# Patient Record
Sex: FEMALE | Race: OTHER | Employment: OTHER | URBAN - METROPOLITAN AREA
[De-identification: names, ages, dates, MRNs, and addresses within clinical notes are randomized per-mention and may not be internally consistent; named-entity substitution may affect disease eponyms.]

---

## 2017-01-07 ENCOUNTER — APPOINTMENT (OUTPATIENT)
Dept: CT IMAGING | Age: 67
DRG: 194 | End: 2017-01-07
Attending: EMERGENCY MEDICINE
Payer: MEDICARE

## 2017-01-07 ENCOUNTER — APPOINTMENT (OUTPATIENT)
Dept: GENERAL RADIOLOGY | Age: 67
DRG: 194 | End: 2017-01-07
Attending: EMERGENCY MEDICINE
Payer: MEDICARE

## 2017-01-07 ENCOUNTER — APPOINTMENT (OUTPATIENT)
Dept: ULTRASOUND IMAGING | Age: 67
DRG: 194 | End: 2017-01-07
Attending: EMERGENCY MEDICINE
Payer: MEDICARE

## 2017-01-07 ENCOUNTER — HOSPITAL ENCOUNTER (INPATIENT)
Age: 67
LOS: 4 days | Discharge: HOME HEALTH | DRG: 194 | End: 2017-01-11
Attending: EMERGENCY MEDICINE | Admitting: HOSPITALIST
Payer: MEDICARE

## 2017-01-07 DIAGNOSIS — J18.9 PNEUMONIA DUE TO INFECTIOUS ORGANISM, UNSPECIFIED LATERALITY, UNSPECIFIED PART OF LUNG: Primary | ICD-10-CM

## 2017-01-07 PROBLEM — R09.02 HYPOXIA: Status: ACTIVE | Noted: 2017-01-07

## 2017-01-07 PROBLEM — R06.03 RESPIRATORY DISTRESS, ACUTE: Status: ACTIVE | Noted: 2017-01-07

## 2017-01-07 PROBLEM — I10 HTN (HYPERTENSION): Status: ACTIVE | Noted: 2017-01-07

## 2017-01-07 PROBLEM — I95.1 ORTHOSTASIS: Status: ACTIVE | Noted: 2017-01-07

## 2017-01-07 PROBLEM — E11.9 DM W/O COMPLICATION TYPE II (HCC): Status: ACTIVE | Noted: 2017-01-07

## 2017-01-07 PROBLEM — E86.0 DEHYDRATION: Status: ACTIVE | Noted: 2017-01-07

## 2017-01-07 LAB
ALBUMIN SERPL BCP-MCNC: 3.4 G/DL (ref 3.4–5)
ALBUMIN/GLOB SERPL: 0.8 {RATIO} (ref 0.8–1.7)
ALP SERPL-CCNC: 55 U/L (ref 45–117)
ALT SERPL-CCNC: 22 U/L (ref 13–56)
AMORPH CRY URNS QL MICRO: ABNORMAL
ANION GAP BLD CALC-SCNC: 7 MMOL/L (ref 3–18)
APPEARANCE UR: CLEAR
ARTERIAL PATENCY WRIST A: YES
AST SERPL W P-5'-P-CCNC: 36 U/L (ref 15–37)
BACTERIA URNS QL MICRO: NEGATIVE /HPF
BASE EXCESS BLD CALC-SCNC: 2 MMOL/L
BASOPHILS # BLD AUTO: 0 K/UL (ref 0–0.06)
BASOPHILS # BLD: 0 % (ref 0–2)
BDY SITE: ABNORMAL
BILIRUB SERPL-MCNC: 0.6 MG/DL (ref 0.2–1)
BILIRUB UR QL: NEGATIVE
BUN SERPL-MCNC: 8 MG/DL (ref 7–18)
BUN/CREAT SERPL: 9 (ref 12–20)
CALCIUM SERPL-MCNC: 8.6 MG/DL (ref 8.5–10.1)
CHLORIDE SERPL-SCNC: 98 MMOL/L (ref 100–108)
CK MB CFR SERPL CALC: ABNORMAL % (ref 0–4)
CK MB SERPL-MCNC: <0.5 NG/ML (ref 0.5–3.6)
CK SERPL-CCNC: 170 U/L (ref 26–192)
CO2 SERPL-SCNC: 30 MMOL/L (ref 21–32)
COLOR UR: YELLOW
CREAT SERPL-MCNC: 0.87 MG/DL (ref 0.6–1.3)
DIFFERENTIAL METHOD BLD: ABNORMAL
EOSINOPHIL # BLD: 0 K/UL (ref 0–0.4)
EOSINOPHIL NFR BLD: 0 % (ref 0–5)
EPITH CASTS URNS QL MICRO: ABNORMAL /LPF (ref 0–5)
ERYTHROCYTE [DISTWIDTH] IN BLOOD BY AUTOMATED COUNT: 12.6 % (ref 11.6–14.5)
EST. AVERAGE GLUCOSE BLD GHB EST-MCNC: 180 MG/DL
GAS FLOW.O2 O2 DELIVERY SYS: ABNORMAL L/MIN
GAS FLOW.O2 SETTING OXYMISER: 4 L/M
GLOBULIN SER CALC-MCNC: 4.5 G/DL (ref 2–4)
GLUCOSE BLD STRIP.AUTO-MCNC: 186 MG/DL (ref 70–110)
GLUCOSE SERPL-MCNC: 144 MG/DL (ref 74–99)
GLUCOSE UR STRIP.AUTO-MCNC: NEGATIVE MG/DL
HBA1C MFR BLD: 7.9 % (ref 4.5–5.6)
HCO3 BLD-SCNC: 25.7 MMOL/L (ref 22–26)
HCT VFR BLD AUTO: 38.4 % (ref 35–45)
HGB BLD-MCNC: 13.3 G/DL (ref 12–16)
HGB UR QL STRIP: NEGATIVE
KETONES UR QL STRIP.AUTO: ABNORMAL MG/DL
LEUKOCYTE ESTERASE UR QL STRIP.AUTO: NEGATIVE
LIPASE SERPL-CCNC: 131 U/L (ref 73–393)
LYMPHOCYTES # BLD AUTO: 14 % (ref 21–52)
LYMPHOCYTES # BLD: 0.9 K/UL (ref 0.9–3.6)
MCH RBC QN AUTO: 30.2 PG (ref 24–34)
MCHC RBC AUTO-ENTMCNC: 34.6 G/DL (ref 31–37)
MCV RBC AUTO: 87.3 FL (ref 74–97)
MONOCYTES # BLD: 0.8 K/UL (ref 0.05–1.2)
MONOCYTES NFR BLD AUTO: 12 % (ref 3–10)
MUCOUS THREADS URNS QL MICRO: ABNORMAL /LPF
NEUTS SEG # BLD: 4.7 K/UL (ref 1.8–8)
NEUTS SEG NFR BLD AUTO: 74 % (ref 40–73)
NITRITE UR QL STRIP.AUTO: NEGATIVE
O2/TOTAL GAS SETTING VFR VENT: 36 %
PCO2 BLD: 33.3 MMHG (ref 35–45)
PH BLD: 7.5 [PH] (ref 7.35–7.45)
PH UR STRIP: 5 [PH] (ref 5–8)
PLATELET # BLD AUTO: 191 K/UL (ref 135–420)
PMV BLD AUTO: 9.9 FL (ref 9.2–11.8)
PO2 BLD: 48 MMHG (ref 80–100)
POTASSIUM SERPL-SCNC: 4.5 MMOL/L (ref 3.5–5.5)
PROT SERPL-MCNC: 7.9 G/DL (ref 6.4–8.2)
PROT UR STRIP-MCNC: 30 MG/DL
RBC # BLD AUTO: 4.4 M/UL (ref 4.2–5.3)
RBC #/AREA URNS HPF: ABNORMAL /HPF (ref 0–5)
SAO2 % BLD: 87 % (ref 92–97)
SERVICE CMNT-IMP: ABNORMAL
SODIUM SERPL-SCNC: 135 MMOL/L (ref 136–145)
SP GR UR REFRACTOMETRY: 1.02 (ref 1–1.03)
SPECIMEN TYPE: ABNORMAL
TOTAL RESP. RATE, ITRR: 24
TROPONIN I SERPL-MCNC: 0.02 NG/ML (ref 0–0.06)
UROBILINOGEN UR QL STRIP.AUTO: 0.2 EU/DL (ref 0.2–1)
WBC # BLD AUTO: 6.4 K/UL (ref 4.6–13.2)
WBC URNS QL MICRO: ABNORMAL /HPF (ref 0–5)

## 2017-01-07 PROCEDURE — 93005 ELECTROCARDIOGRAM TRACING: CPT

## 2017-01-07 PROCEDURE — 82550 ASSAY OF CK (CPK): CPT | Performed by: EMERGENCY MEDICINE

## 2017-01-07 PROCEDURE — 36600 WITHDRAWAL OF ARTERIAL BLOOD: CPT

## 2017-01-07 PROCEDURE — 76705 ECHO EXAM OF ABDOMEN: CPT

## 2017-01-07 PROCEDURE — 94640 AIRWAY INHALATION TREATMENT: CPT

## 2017-01-07 PROCEDURE — 83036 HEMOGLOBIN GLYCOSYLATED A1C: CPT | Performed by: HOSPITALIST

## 2017-01-07 PROCEDURE — 99285 EMERGENCY DEPT VISIT HI MDM: CPT

## 2017-01-07 PROCEDURE — 80053 COMPREHEN METABOLIC PANEL: CPT | Performed by: EMERGENCY MEDICINE

## 2017-01-07 PROCEDURE — 65660000000 HC RM CCU STEPDOWN

## 2017-01-07 PROCEDURE — 81001 URINALYSIS AUTO W/SCOPE: CPT | Performed by: EMERGENCY MEDICINE

## 2017-01-07 PROCEDURE — 96374 THER/PROPH/DIAG INJ IV PUSH: CPT

## 2017-01-07 PROCEDURE — 74011000258 HC RX REV CODE- 258: Performed by: EMERGENCY MEDICINE

## 2017-01-07 PROCEDURE — 74011636637 HC RX REV CODE- 636/637: Performed by: HOSPITALIST

## 2017-01-07 PROCEDURE — 71010 XR CHEST PORT: CPT

## 2017-01-07 PROCEDURE — 74011250637 HC RX REV CODE- 250/637: Performed by: HOSPITALIST

## 2017-01-07 PROCEDURE — 96361 HYDRATE IV INFUSION ADD-ON: CPT

## 2017-01-07 PROCEDURE — 74011250636 HC RX REV CODE- 250/636: Performed by: HOSPITALIST

## 2017-01-07 PROCEDURE — 85025 COMPLETE CBC W/AUTO DIFF WBC: CPT | Performed by: EMERGENCY MEDICINE

## 2017-01-07 PROCEDURE — 74011250636 HC RX REV CODE- 250/636: Performed by: EMERGENCY MEDICINE

## 2017-01-07 PROCEDURE — 74011250637 HC RX REV CODE- 250/637: Performed by: EMERGENCY MEDICINE

## 2017-01-07 PROCEDURE — 71275 CT ANGIOGRAPHY CHEST: CPT

## 2017-01-07 PROCEDURE — 74011000250 HC RX REV CODE- 250: Performed by: HOSPITALIST

## 2017-01-07 PROCEDURE — 77030013140 HC MSK NEB VYRM -A

## 2017-01-07 PROCEDURE — 82962 GLUCOSE BLOOD TEST: CPT

## 2017-01-07 PROCEDURE — 74011000250 HC RX REV CODE- 250: Performed by: EMERGENCY MEDICINE

## 2017-01-07 PROCEDURE — 82803 BLOOD GASES ANY COMBINATION: CPT

## 2017-01-07 PROCEDURE — 74176 CT ABD & PELVIS W/O CONTRAST: CPT

## 2017-01-07 PROCEDURE — 87040 BLOOD CULTURE FOR BACTERIA: CPT | Performed by: EMERGENCY MEDICINE

## 2017-01-07 PROCEDURE — 74011000258 HC RX REV CODE- 258: Performed by: HOSPITALIST

## 2017-01-07 PROCEDURE — 83690 ASSAY OF LIPASE: CPT | Performed by: EMERGENCY MEDICINE

## 2017-01-07 PROCEDURE — 74011636320 HC RX REV CODE- 636/320: Performed by: EMERGENCY MEDICINE

## 2017-01-07 RX ORDER — ENOXAPARIN SODIUM 100 MG/ML
40 INJECTION SUBCUTANEOUS EVERY 24 HOURS
Status: DISCONTINUED | OUTPATIENT
Start: 2017-01-07 | End: 2017-01-11 | Stop reason: HOSPADM

## 2017-01-07 RX ORDER — MAGNESIUM SULFATE 100 %
4 CRYSTALS MISCELLANEOUS AS NEEDED
Status: DISCONTINUED | OUTPATIENT
Start: 2017-01-07 | End: 2017-01-11 | Stop reason: HOSPADM

## 2017-01-07 RX ORDER — ACETAMINOPHEN 325 MG/1
650 TABLET ORAL
Status: COMPLETED | OUTPATIENT
Start: 2017-01-07 | End: 2017-01-07

## 2017-01-07 RX ORDER — ONDANSETRON 2 MG/ML
4 INJECTION INTRAMUSCULAR; INTRAVENOUS
Status: COMPLETED | OUTPATIENT
Start: 2017-01-07 | End: 2017-01-07

## 2017-01-07 RX ORDER — IPRATROPIUM BROMIDE AND ALBUTEROL SULFATE 2.5; .5 MG/3ML; MG/3ML
3 SOLUTION RESPIRATORY (INHALATION)
Status: DISCONTINUED | OUTPATIENT
Start: 2017-01-07 | End: 2017-01-10

## 2017-01-07 RX ORDER — IPRATROPIUM BROMIDE AND ALBUTEROL SULFATE 2.5; .5 MG/3ML; MG/3ML
3 SOLUTION RESPIRATORY (INHALATION) ONCE
Status: COMPLETED | OUTPATIENT
Start: 2017-01-07 | End: 2017-01-07

## 2017-01-07 RX ORDER — SODIUM CHLORIDE 0.9 % (FLUSH) 0.9 %
5-10 SYRINGE (ML) INJECTION AS NEEDED
Status: DISCONTINUED | OUTPATIENT
Start: 2017-01-07 | End: 2017-01-11 | Stop reason: HOSPADM

## 2017-01-07 RX ORDER — MECLIZINE HCL 12.5 MG 12.5 MG/1
25 TABLET ORAL
Status: COMPLETED | OUTPATIENT
Start: 2017-01-07 | End: 2017-01-07

## 2017-01-07 RX ORDER — ALBUTEROL SULFATE 0.83 MG/ML
5 SOLUTION RESPIRATORY (INHALATION)
Status: COMPLETED | OUTPATIENT
Start: 2017-01-07 | End: 2017-01-07

## 2017-01-07 RX ORDER — INSULIN LISPRO 100 [IU]/ML
INJECTION, SOLUTION INTRAVENOUS; SUBCUTANEOUS
Status: DISCONTINUED | OUTPATIENT
Start: 2017-01-07 | End: 2017-01-10

## 2017-01-07 RX ORDER — DEXTROSE 50 % IN WATER (D50W) INTRAVENOUS SYRINGE
25-50 AS NEEDED
Status: DISCONTINUED | OUTPATIENT
Start: 2017-01-07 | End: 2017-01-11 | Stop reason: HOSPADM

## 2017-01-07 RX ORDER — ACETAMINOPHEN 325 MG/1
650 TABLET ORAL
Status: DISCONTINUED | OUTPATIENT
Start: 2017-01-07 | End: 2017-01-11 | Stop reason: HOSPADM

## 2017-01-07 RX ORDER — SODIUM CHLORIDE 9 MG/ML
75 INJECTION, SOLUTION INTRAVENOUS CONTINUOUS
Status: DISPENSED | OUTPATIENT
Start: 2017-01-07 | End: 2017-01-08

## 2017-01-07 RX ORDER — ONDANSETRON 2 MG/ML
4 INJECTION INTRAMUSCULAR; INTRAVENOUS
Status: DISCONTINUED | OUTPATIENT
Start: 2017-01-07 | End: 2017-01-11 | Stop reason: HOSPADM

## 2017-01-07 RX ORDER — SODIUM CHLORIDE 0.9 % (FLUSH) 0.9 %
5-10 SYRINGE (ML) INJECTION EVERY 8 HOURS
Status: DISCONTINUED | OUTPATIENT
Start: 2017-01-07 | End: 2017-01-11 | Stop reason: HOSPADM

## 2017-01-07 RX ADMIN — SODIUM CHLORIDE 1000 ML: 900 INJECTION, SOLUTION INTRAVENOUS at 09:59

## 2017-01-07 RX ADMIN — ACETAMINOPHEN 650 MG: 325 TABLET, FILM COATED ORAL at 17:13

## 2017-01-07 RX ADMIN — ALBUTEROL SULFATE 5 MG: 2.5 SOLUTION RESPIRATORY (INHALATION) at 17:09

## 2017-01-07 RX ADMIN — METHYLPREDNISOLONE SODIUM SUCCINATE 60 MG: 125 INJECTION, POWDER, FOR SOLUTION INTRAMUSCULAR; INTRAVENOUS at 23:07

## 2017-01-07 RX ADMIN — INSULIN DETEMIR 20 UNITS: 100 INJECTION, SOLUTION SUBCUTANEOUS at 23:07

## 2017-01-07 RX ADMIN — INSULIN LISPRO 2 UNITS: 100 INJECTION, SOLUTION INTRAVENOUS; SUBCUTANEOUS at 22:52

## 2017-01-07 RX ADMIN — SODIUM CHLORIDE 1000 MG: 900 INJECTION, SOLUTION INTRAVENOUS at 20:21

## 2017-01-07 RX ADMIN — ONDANSETRON 4 MG: 2 INJECTION INTRAMUSCULAR; INTRAVENOUS at 09:59

## 2017-01-07 RX ADMIN — MECLIZINE HYDROCHLORIDE 25 MG: 12.5 TABLET ORAL at 14:23

## 2017-01-07 RX ADMIN — Medication 10 ML: at 22:53

## 2017-01-07 RX ADMIN — PIPERACILLIN AND TAZOBACTAM 3.38 G: 3; .375 INJECTION, POWDER, FOR SOLUTION INTRAVENOUS at 22:53

## 2017-01-07 RX ADMIN — IPRATROPIUM BROMIDE AND ALBUTEROL SULFATE 3 ML: .5; 3 SOLUTION RESPIRATORY (INHALATION) at 14:28

## 2017-01-07 RX ADMIN — GUAIFENESIN 600 MG: 600 TABLET, EXTENDED RELEASE ORAL at 20:21

## 2017-01-07 RX ADMIN — CEFTRIAXONE 1 G: 1 INJECTION, POWDER, FOR SOLUTION INTRAMUSCULAR; INTRAVENOUS at 16:51

## 2017-01-07 RX ADMIN — IPRATROPIUM BROMIDE AND ALBUTEROL SULFATE 3 ML: .5; 3 SOLUTION RESPIRATORY (INHALATION) at 20:51

## 2017-01-07 RX ADMIN — SODIUM CHLORIDE 75 ML/HR: 900 INJECTION, SOLUTION INTRAVENOUS at 22:52

## 2017-01-07 RX ADMIN — IOPAMIDOL 100 ML: 755 INJECTION, SOLUTION INTRAVENOUS at 15:19

## 2017-01-07 RX ADMIN — SODIUM CHLORIDE 500 ML: 900 INJECTION, SOLUTION INTRAVENOUS at 14:22

## 2017-01-07 RX ADMIN — ACETAMINOPHEN 650 MG: 325 TABLET, FILM COATED ORAL at 22:52

## 2017-01-07 NOTE — ED NOTES
TRANSFER - OUT REPORT:    Verbal report given to [facility], rn  (name) on Gabriel Gomez  being transferred to tele  (unit) for routine progression of care       Report consisted of patients Situation, Background, Assessment and   Recommendations(SBAR). Information from the following report(s) SBAR, Kardex and ED Summary was reviewed with the receiving nurse. Lines:   Peripheral IV 01/07/17 Left Antecubital (Active)   Site Assessment Clean, dry, & intact 1/7/2017  9:56 AM   Phlebitis Assessment 0 1/7/2017  9:56 AM   Infiltration Assessment 0 1/7/2017  9:56 AM   Dressing Status Clean, dry, & intact 1/7/2017  9:56 AM   Dressing Type Transparent 1/7/2017  9:56 AM   Hub Color/Line Status Pink 1/7/2017  9:56 AM        Opportunity for questions and clarification was provided.       Patient transported with:

## 2017-01-07 NOTE — ED NOTES
Dr Nicole Highures aware that pt states her breathing feels more labored with a cough and chest tightness,  Dr Nicole Richter reviewed xray film.  No further orders given and pt to US

## 2017-01-07 NOTE — ED TRIAGE NOTES
C/o dizziness onset this am, pt states had vomiting and diarrhea onset Monday, pt states was better but began having sx again on thurs. Pt c/o abd pain . pr reports no v/d since Thursday   Sepsis Screening completed    (  )Patient meets SIRS criteria. ( x )Patient does not meet SIRS criteria.       SIRS Criteria is achieved when two or more of the following are present   Temperature < 96.8°F (36°C) or > 100.9°F (38.3°C)   Heart Rate > 90 beats per minute   Respiratory Rate > 20 beats per minute   WBC count > 12,000 or <4,000 or > 10% bands

## 2017-01-07 NOTE — IP AVS SNAPSHOT
Summary of Care Report The Summary of Care report has been created to help improve care coordination. Users with access to PlayEnable or [address] (Web-based application) may access additional patient information including the Discharge Summary. If you are not currently a [address] user and need more information, please call the number listed below in the [place] [address] section and ask to be connected with Medical Records. Facility Information Name Address Phone [place] [phone] Patient Information Patient Name Sex  Shirley Reed ([id number]) Female 1950 Discharge Information Admitting Provider Service Area Unit Talat Cartagena MD / [id number] [facility] Card/Med / [phone] Discharge Provider Discharge Date/Time Discharge Disposition Destination (none) 2017 (Pending) AHR (none) Patient Language Language ENGLISH [13] Problem List as of 2017  Date Reviewed: 2014 Codes Priority Class Noted - Resolved Ureteral stone ICD-10-CM: N20.1 ICD-9-CM: 592.1   2014 - Present * (Principal)Pneumonia ICD-10-CM: J18.9 ICD-9-CM: 067   2017 - Present Orthostasis ICD-10-CM: I95.1 ICD-9-CM: 458.0   2017 - Present Dehydration ICD-10-CM: E86.0 ICD-9-CM: 276.51   2017 - Present DM w/o complication type II ([facility] [address] ICD-10-CM: E11.9 ICD-9-CM: 250.00   2017 - Present Respiratory distress, acute ([facility] [address] ICD-10-CM: R06.00 
ICD-9-CM: 518.82   2017 - Present Hypoxia ICD-10-CM: R09.02 
ICD-9-CM: 799.02   2017 - Present HTN (hypertension) ICD-10-CM: I10 
ICD-9-CM: 401.9   2017 - Present Meniere disease ICD-10-CM: H81.09 
ICD-9-CM: 386.00   2017 - Present Asthma exacerbation ICD-10-CM: J45. [address] ICD-9-CM: 493.92   1/8/2017 - Present You are allergic to the following No active allergies Current Discharge Medication List  
  
START taking these medications Dose & Instructions Dispensing Information Comments  
 albuterol 90 mcg/actuation inhaler Commonly known as:  PROVENTIL HFA, VENTOLIN HFA, PROAIR HFA Dose:  1 Puff Take 1 Puff by inhalation every four (4) hours as needed for Wheezing. Quantity:  1 Inhaler Refills:  0  
   
 fluticasone-salmeterol 100-50 mcg/dose diskus inhaler Commonly known as:  ADVAIR DISKUS Dose:  1 Puff Take 1 Puff by inhalation every twelve (12) hours. Quantity:  1 Inhaler Refills:  0  
   
 guaiFENesin  mg SR tablet Commonly known as:  Eran & Eran Dose:  600 mg Take 1 Tab by mouth every twelve (12) hours. Quantity:  20 Tab Refills:  0  
   
 levoFLOXacin 500 mg tablet Commonly known as:  Harry Mae Dose:  500 mg Take 1 Tab by mouth daily. Quantity:  3 Tab Refills:  0  
   
 predniSONE 10 mg tablet Commonly known as:  Iram Miguelito Take 4 tabs x 2 days, 3 tabs x 2 days , 2 tabs x 2 days, 1 tab x 2 days, 0.5 tab x 2 days Quantity:  21 Tab Refills:  0 CONTINUE these medications which have CHANGED Dose & Instructions Dispensing Information Comments  
 insulin detemir 100 unit/mL injection Commonly known as:  LEVEMIR What changed:  how much to take Dose:  15 Units 0.15 mL by SubCUTAneous route nightly. Quantity:  1 Vial  
Refills:  0  
   
 insulin lispro 100 unit/mL injection Commonly known as:  HumaLOG What changed:   
- how much to take - when to take this Dose:  5 Units 5 Units by SubCUTAneous route Before breakfast, lunch, and dinner. Quantity:  1 Vial  
Refills:  0 CONTINUE these medications which have NOT CHANGED Dose & Instructions Dispensing Information Comments  
 enalapril 20 mg tablet Commonly known as:  Terri Foot  Dose:  20 mg  
 Take 20 mg by mouth daily. Instructed to take morning of surgery with sip of water Refills:  0  
   
 levothyroxine 100 mcg tablet Commonly known as:  SYNTHROID Dose:  100 mcg Take 100 mcg by mouth Daily (before breakfast). Instructed to take morning of surgery with sip of water. Refills:  0  
   
 meclizine 25 mg tablet Commonly known as:  ANTIVERT Take 1 tablet by mouth every 6 hours for the next 3 days. Then stop taking the meclizine. Restart the meclizine for 3 day intervals if vertigo/ dizziness returns. Quantity:  20 Tab Refills:  0  
   
 simvastatin 40 mg tablet Commonly known as:  ZOCOR Dose:  40 mg Take 40 mg by mouth nightly. Refills:  0 Current Immunizations Name Date Influenza Vaccine (Quad) PF 1/11/2017 Follow-up Information Follow up With Details Comments Contact Info Jonathan Calzada MD In 1 week  Patient can only remember the practice name and not the physician Discharge Instructions Learning About High Blood Pressure What is high blood pressure? Blood pressure is a measure of how hard the blood pushes against the walls of your arteries. It's normal for blood pressure to go up and down throughout the day, but if it stays up, you have high blood pressure. Another name for high blood pressure is hypertension. Two numbers tell you your blood pressure. The first number is the systolic pressure. It shows how hard the blood pushes when your heart is pumping. The second number is the diastolic pressure. It shows how hard the blood pushes between heartbeats, when your heart is relaxed and filling with blood. A blood pressure of less than 120/80 (say \"120 over 80\") is ideal for an adult. High blood pressure is 140/90 or higher. You have high blood pressure if your top number is 140 or higher or your bottom number is 90 or higher, or both.  Many people fall into the category in between, called prehypertension. People with prehypertension need to make lifestyle changes to bring their blood pressure down and help prevent or delay high blood pressure. What happens when you have high blood pressure? · Blood flows through your arteries with too much force. Over time, this damages the walls of your arteries. But you can't feel it. High blood pressure usually doesn't cause symptoms. · Fat and calcium start to build up in your arteries. This buildup is called plaque. Plaque makes your arteries narrower and stiffer. Blood can't flow through them as easily. · This lack of good blood flow starts to damage some of the organs in your body. This can lead to problems such as coronary artery disease and heart attack, heart failure, stroke, kidney failure, and eye damage. How can you prevent high blood pressure? · Stay at a healthy weight. · Try to limit how much sodium you eat to less than 2,300 milligrams (mg) a day. If you limit your sodium to 1,500 mg a day, you can lower your blood pressure even more. ¨ Buy foods that are labeled \"unsalted,\" \"sodium-free,\" or \"low-sodium. \" Foods labeled \"reduced-sodium\" and \"light sodium\" may still have too much sodium. ¨ Flavor your food with garlic, lemon juice, onion, vinegar, herbs, and spices instead of salt. Do not use soy sauce, steak sauce, onion salt, garlic salt, mustard, or ketchup on your food. ¨ Use less salt (or none) when recipes call for it. You can often use half the salt a recipe calls for without losing flavor. · Be physically active. Get at least 30 minutes of exercise on most days of the week. Walking is a good choice. You also may want to do other activities, such as running, swimming, cycling, or playing tennis or team sports. · Limit alcohol to 2 drinks a day for men and 1 drink a day for women. · Eat plenty of fruits, vegetables, and low-fat dairy products. Eat less saturated and total fats. How is high blood pressure treated? · Your doctor will suggest making lifestyle changes. For example, your doctor may ask you to eat healthy foods, quit smoking, lose extra weight, and be more active. · If lifestyle changes don't help enough or your blood pressure is very high, you will have to take medicine every day. Follow-up care is a key part of your treatment and safety. Be sure to make and go to all appointments, and call your doctor if you are having problems. It's also a good idea to know your test results and keep a list of the medicines you take. Where can you learn more? Go to http://hakeem-mehreen.info/. Enter P501 in the search box to learn more about \"Learning About High Blood Pressure. \" Current as of: March 23, 2016 Content Version: 11.1 © 4303-8422 [employer]. Care instructions adapted under license by [employer] (which disclaims liability or warranty for this information). If you have questions about a medical condition or this instruction, always ask your healthcare professional. [place] [address] any warranty or liability for your use of this information. Pneumonia: Care Instructions Your Care Instructions Pneumonia is an infection of the lungs. Most cases are caused by infections from bacteria or viruses. Pneumonia may be mild or very severe. If it is caused by bacteria, you will be treated with antibiotics. It may take a few weeks to a few months to recover fully from pneumonia, depending on how sick you were and whether your overall health is good. Follow-up care is a key part of your treatment and safety. Be sure to make and go to all appointments, and call your doctor if you are having problems. Its also a good idea to know your test results and keep a list of the medicines you take. How can you care for yourself at home? · Take your antibiotics exactly as directed.  Do not stop taking the medicine just because you are feeling better. You need to take the full course of antibiotics. · Take your medicines exactly as prescribed. Call your doctor if you think you are having a problem with your medicine. · Get plenty of rest and sleep. You may feel weak and tired for a while, but your energy level will improve with time. · To prevent dehydration, drink plenty of fluids, enough so that your urine is light yellow or clear like water. Choose water and other caffeine-free clear liquids until you feel better. If you have kidney, heart, or liver disease and have to limit fluids, talk with your doctor before you increase the amount of fluids you drink. · Take care of your cough so you can rest. A cough that brings up mucus from your lungs is common with pneumonia. It is one way your body gets rid of the infection. But if coughing keeps you from resting or causes severe fatigue and chest-wall pain, talk to your doctor. He or she may suggest that you take a medicine to reduce the cough. · Use a vaporizer or humidifier to add moisture to your bedroom. Follow the directions for cleaning the machine. · Do not smoke or allow others to smoke around you. Smoke will make your cough last longer. If you need help quitting, talk to your doctor about stop-smoking programs and medicines. These can increase your chances of quitting for good. · Take an over-the-counter pain medicine, such as acetaminophen (Tylenol), ibuprofen (Advil, Motrin), or naproxen (Aleve). Read and follow all instructions on the label. · Do not take two or more pain medicines at the same time unless the doctor told you to. Many pain medicines have acetaminophen, which is Tylenol. Too much acetaminophen (Tylenol) can be harmful. · If you were given a spirometer to measure how well your lungs are working, use it as instructed. This can help your doctor tell how your recovery is going. · To prevent pneumonia in the future, talk to your doctor about getting a flu vaccine (once a year) and a pneumococcal vaccine (one time only for most people). When should you call for help? Call 911 anytime you think you may need emergency care. For example, call if: 
· You have severe trouble breathing. Call your doctor now or seek immediate medical care if: 
· You cough up dark brown or bloody mucus (sputum). · You have new or worse trouble breathing. · You are dizzy or lightheaded, or you feel like you may faint. Watch closely for changes in your health, and be sure to contact your doctor if: 
· You have a new or higher fever. · You are coughing more deeply or more often. · You are not getting better after 2 days (48 hours). · You do not get better as expected. Where can you learn more? Go to http://hakeem-mehreen.info/. Enter [id number] in the search box to learn more about \"Pneumonia: Care Instructions. \" Current as of: May 23, 2016 Content Version: 11.1 © 2288-5884 [employer]. Care instructions adapted under license by [employer] (which disclaims liability or warranty for this information). If you have questions about a medical condition or this instruction, always ask your healthcare professional. [address] any warranty or liability for your use of this information. Blucarat Activation Thank you for requesting access to [employer]uc[employer]. Please follow the instructions below to securely access and download your online medical record. Blucarat allows you to send messages to your doctor, view your test results, renew your prescriptions, schedule appointments, and more. How Do I Sign Up? 1. In your internet browser, go to [email or web address] 
2. Click on the First Time User? Click Here link in the Sign In box. You will be redirect to the New Member Sign Up page. 3. Enter your Blucarat Access Code exactly as it appears below.  You will not need to use this code after youve completed the sign-up process. If you do not sign up before the expiration date, you must request a new code. AdviceIQ Access Code: [email or web address] Expires: 2017 10:07 AM (This is the date your AdviceIQ access code will ) 4. Enter the last four digits of your Social Security Number (xxxx) and Date of Birth (mm/dd/yyyy) as indicated and click Submit. You will be taken to the next sign-up page. 5. Create a Data Sentry Solutionst ID. This will be your AdviceIQ login ID and cannot be changed, so think of one that is secure and easy to remember. 6. Create a AdviceIQ password. You can change your password at any time. 7. Enter your Password Reset Question and Answer. This can be used at a later time if you forget your password. 8. Enter your e-mail address. You will receive e-mail notification when new information is available in [address] 9. Click Sign Up. You can now view and download portions of your medical record. 10. Click the Download Summary menu link to download a portable copy of your medical information. Additional Information If you have questions, please visit the Frequently Asked Questions section of the AdviceIQ website at https://Handseeing Information. [employer]View [employer]. Logicworks/Wearable Securityhart/. Remember, AdviceIQ is NOT to be used for urgent needs. For medical emergencies, dial 911. Patient armband removed and shredded Chart Review Routing History Recipient Method Report Sent By Tarik Fitzgerald MD  
Phone: [phone] In Basket IP Auto Routed Faith Mliler MD [[id number]] 7/10/2014 11:45 PM 07/10/2014

## 2017-01-07 NOTE — IP AVS SNAPSHOT
Current Discharge Medication List  
  
Take these medications at their scheduled times Dose & Instructions Dispensing Information Comments Morning Noon Evening Bedtime  
 enalapril 20 mg tablet Commonly known as:  Jackie Archibald Your next dose is: Today, Tomorrow Other:  ____________ Dose:  20 mg Take 20 mg by mouth daily. Instructed to take morning of surgery with sip of water Refills:  0  
     
   
   
   
  
 fluticasone-salmeterol 100-50 mcg/dose diskus inhaler Commonly known as:  ADVAIR DISKUS Your next dose is: Today, Tomorrow Other:  ____________ Dose:  1 Puff Take 1 Puff by inhalation every twelve (12) hours. Quantity:  1 Inhaler Refills:  0  
     
   
   
   
  
 guaiFENesin  mg SR tablet Commonly known as:  [address] Your next dose is: Today, Tomorrow Other:  ____________ Dose:  600 mg Take 1 Tab by mouth every twelve (12) hours. Quantity:  20 Tab Refills:  0  
     
   
   
   
  
 insulin detemir 100 unit/mL injection Commonly known as:  LEVEMIR Your next dose is: Today, Tomorrow Other:  ____________ Dose:  15 Units 0.15 mL by SubCUTAneous route nightly. Quantity:  1 Vial  
Refills:  0  
     
   
   
   
  
 insulin lispro 100 unit/mL injection Commonly known as:  HumaLOG Your next dose is: Today, Tomorrow Other:  ____________ Dose:  5 Units 5 Units by SubCUTAneous route Before breakfast, lunch, and dinner. Quantity:  1 Vial  
Refills:  0  
     
   
   
   
  
 levoFLOXacin 500 mg tablet Commonly known as:  Felipe Claude Your next dose is: Today, Tomorrow Other:  ____________ Dose:  500 mg Take 1 Tab by mouth daily. Quantity:  3 Tab Refills:  0  
     
   
   
   
  
 levothyroxine 100 mcg tablet Commonly known as:  SYNTHROID Your next dose is: Today, Tomorrow Other:  ____________ Dose:  100 mcg Take 100 mcg by mouth Daily (before breakfast). Instructed to take morning of surgery with sip of water. Refills:  0  
     
   
   
   
  
 simvastatin 40 mg tablet Commonly known as:  ZOCOR Your next dose is: Today, Tomorrow Other:  ____________ Dose:  40 mg Take 40 mg by mouth nightly. Refills:  0 Take these medications as needed Dose & Instructions Dispensing Information Comments Morning Noon Evening Bedtime  
 albuterol 90 mcg/actuation inhaler Commonly known as:  PROVENTIL HFA, VENTOLIN HFA, PROAIR HFA Your next dose is: Today, Tomorrow Other:  ____________ Dose:  1 Puff Take 1 Puff by inhalation every four (4) hours as needed for Wheezing. Quantity:  1 Inhaler Refills:  0 Take these medications as directed Dose & Instructions Dispensing Information Comments Morning Noon Evening Bedtime  
 meclizine 25 mg tablet Commonly known as:  ANTIVERT Your next dose is: Today, Tomorrow Other:  ____________ Take 1 tablet by mouth every 6 hours for the next 3 days. Then stop taking the meclizine. Restart the meclizine for 3 day intervals if vertigo/ dizziness returns. Quantity:  20 Tab Refills:  0  
     
   
   
   
  
 predniSONE 10 mg tablet Commonly known as:  Abigail Roers Your next dose is: Today, Tomorrow Other:  ____________ Take 4 tabs x 2 days, 3 tabs x 2 days , 2 tabs x 2 days, 1 tab x 2 days, 0.5 tab x 2 days Quantity:  21 Tab Refills:  0 Where to Get Your Medications These medications were sent to [address], [place] - [address] [place] [address] & [address] [place] [address], [facility]ly [facility] [employer] [facility] [address] [address] Phone:  [phone] insulin detemir 100 unit/mL injection Information about where to get these medications is not yet available ! Ask your nurse or doctor about these medications  
  albuterol 90 mcg/actuation inhaler  
 fluticasone-salmeterol 100-50 mcg/dose diskus inhaler  
 guaiFENesin  mg SR tablet  
 insulin lispro 100 unit/mL injection  
 levoFLOXacin 500 mg tablet  
 predniSONE 10 mg tablet

## 2017-01-07 NOTE — H&P
History & Physical    Patient: Abigail Sweeney MRN: [id number]  CSN: [id number]    YOB: 1950  Age: 77 y.o. Sex: female      DOA: 1/7/2017  Primary Care Provider:  Jonathan Calzada MD      Assessment/Plan     Patient Active Problem List   Diagnosis Code    Ureteral stone N20.1    Pneumonia J18.9    Orthostasis I95.1    Dehydration E82.4    DM w/o complication type II ([facility] [address] E11.9    Respiratory distress, acute ([facility] [address] R06.00    Hypoxia R09.02    HTN (hypertension) I10       Admit to tele  1 acute respiratory distress and hypoxia   Due to pna , will continue nc o2 and treat pna  2. PNA  Will switch to vanc and zosyn, breathing tx, increase nc O2 to 6 L/min. Short term iv steroid. Close monitor  3. DM type II   -long term insulin   -on levimer , ssi, diabetic diet , hypoglycemia protocol     4. HTN, accelerated  Continue home medication. 5.orthostasis and dehydration  Continue hydration     DVT : lovenox. ppi proph, full code   CC: dizziness and sob        HPI:     Abigail Sweeney is a 77 y.o. female who hx of DM type II/ HTN with recent travel to [place] was sent to Ed due to dizziness and sob. She reported that she had n/v -\"stomach bugs\" last Monday, then feel dizziness for 3 days, further presented chills/fever /cough with greenish sputum for two days, associated sob on exertion. Reported tired. She was found orthostasis in ed-1 L ns given. Cta: negative for PE, but indicated in middle lobes, lingula and right middle lobe either atelectasis and/or pneumonia. She presented desat on exertion, abg PO2 48 on 4 L nc O2. Given one dose rocephin in ED and breathing tx. Denies any slurred speech/headache/cp/n/v/blurred vission/d/c/palpitation/gait change/bleeding. Denies smoking/ any alcohol or drug use.      On admission,  Visit Vitals    /64 (BP 1 Location: Right arm)    Pulse 87    Temp (!) 102 °F (38.9 °C)    Resp 18    Ht 5' 3\" (1.6 m)    Wt 67.1 kg (148 lb)    SpO2 92%    BMI 26.22 kg/m2    O2 Flow Rate (L/min): 4 l/min O2 Device: Nasal cannula      Past Medical History   Diagnosis Date    Chronic pain      low back pain    Diabetes ([address] 2001     type II, was on oral agents for a while    Hypercholesteremia     Hypertension     Ill-defined condition      kidny stones    Thyroid disease        Past Surgical History   Procedure Laterality Date    Hx gyn  1977     tubal ligation       Family History   Problem Relation Age of Onset    Cancer Mother        Social History     Social History    Marital status: SINGLE     Spouse name: N/A    Number of children: N/A    Years of education: N/A     Social History Main Topics    Smoking status: Former Smoker     Quit date: 6/6/1998    Smokeless tobacco: None    Alcohol use Yes      Comment: rare    Drug use: No    Sexual activity: Not Asked     Other Topics Concern    None     Social History Narrative       Prior to Admission medications    Medication Sig Start Date End Date Taking? Authorizing Provider   insulin detemir (LEVEMIR) 100 unit/mL injection 27 Units by SubCUTAneous route nightly. Yes Jonathan Calzada MD   meclizine (ANTIVERT) 25 mg tablet Take 1 tablet by mouth every 6 hours for the next 3 days. Then stop taking the meclizine. Restart the meclizine for 3 day intervals if vertigo/ dizziness returns. 8/1/16   Cecy Cuello MD   levothyroxine (SYNTHROID) 100 mcg tablet Take 100 mcg by mouth Daily (before breakfast). Instructed to take morning of surgery with sip of water. Historical Provider   insulin lispro (HUMALOG) 100 unit/mL injection 12 Units by SubCUTAneous route three (3) times daily. Jonathan Calzada MD   simvastatin (ZOCOR) 40 mg tablet Take 40 mg by mouth nightly. Jonathan Calzada MD   enalapril (VASOTEC) 20 mg tablet Take 20 mg by mouth daily. Instructed to take morning of surgery with sip of water    Jonathan Calzada MD       No Known Allergies    Review of Systems  Gen: fever, chills, malaise, no weight loss/gain. Heent: No headache, rhinorrhea, epistaxis, ear pain, hearing loss, sinus pain, neck pain/stiffness, sore throat. Heart: No chest pain, palpitations, QUINONEZ, pnd, or orthopnea. Resp: cough, no hemoptysis,  +wheezing and shortness of breath. GI: nausea, no  vomiting, diarrhea, constipation, melena or hematochezia. : No urinary obstruction, dysuria or hematuria. Derm: No rash, new skin lesion or pruritis. Musc/skeletal: no bone or joint complains. Vasc: No edema, cyanosis or claudication. Endo: No heat/cold intolerance, no polyuria,polydipsia or polyphagia. Neuro: No unilateral weakness, numbness, tingling. No seizures. Dizziness   Heme: No easy bruising or bleeding. Physical Exam:     Physical Exam:  Visit Vitals    /64 (BP 1 Location: Right arm)    Pulse 87    Temp (!) 102 °F (38.9 °C)    Resp 18    Ht 5' 3\" (1.6 m)    Wt 67.1 kg (148 lb)    SpO2 92%    BMI 26.22 kg/m2    O2 Flow Rate (L/min): 4 l/min O2 Device: Nasal cannula    Temp (24hrs), Av.3 °F (38.5 °C), Min:100 °F (37.8 °C), Max:102 °F (38.9 °C)             General:  Awake, cooperative, mild distress. Head:  Normocephalic, without obvious abnormality, atraumatic. Eyes:  Conjunctivae/corneas clear, sclera anicteric, PERRL, EOMs intact. Nose: Nares normal. No drainage or sinus tenderness. Throat: Lips, mucosa, and tongue normal. .   Neck: Supple, symmetrical, trachea midline, no adenopathy. Lungs:   Mild wheezing, rales in R side        Heart:  Regular rate and rhythm, S1, S2 normal, no murmur, click, rub or gallop. Abdomen: Soft, non-tender. Bowel sounds normal. No masses,  No organomegaly. Extremities: Extremities normal, atraumatic, no cyanosis or edema. Pulses: 2+ and symmetric all extremities. Skin: Skin color-pink, texture, turgor normal. No rashes or lesions. Capillary refill normal    Neurologic: CNII-XII intact. No focal motor or sensory deficit.        Labs Reviewed:    BMP:   Lab Results   Component Value Date/Time     (L) 01/07/2017 09:50 AM    K 4.5 01/07/2017 09:50 AM    CL 98 (L) 01/07/2017 09:50 AM    CO2 30 01/07/2017 09:50 AM    AGAP 7 01/07/2017 09:50 AM     (H) 01/07/2017 09:50 AM    BUN 8 01/07/2017 09:50 AM    CREA 0.87 01/07/2017 09:50 AM    GFRAA >60 01/07/2017 09:50 AM    GFRNA >60 01/07/2017 09:50 AM     CMP:   Lab Results   Component Value Date/Time     (L) 01/07/2017 09:50 AM    K 4.5 01/07/2017 09:50 AM    CL 98 (L) 01/07/2017 09:50 AM    CO2 30 01/07/2017 09:50 AM    AGAP 7 01/07/2017 09:50 AM     (H) 01/07/2017 09:50 AM    BUN 8 01/07/2017 09:50 AM    CREA 0.87 01/07/2017 09:50 AM    GFRAA >60 01/07/2017 09:50 AM    GFRNA >60 01/07/2017 09:50 AM    CA 8.6 01/07/2017 09:50 AM    ALB 3.4 01/07/2017 09:50 AM    TP 7.9 01/07/2017 09:50 AM    GLOB 4.5 (H) 01/07/2017 09:50 AM    AGRAT 0.8 01/07/2017 09:50 AM    SGOT 36 01/07/2017 09:50 AM    ALT 22 01/07/2017 09:50 AM     CBC:   Lab Results   Component Value Date/Time    WBC 6.4 01/07/2017 09:50 AM    HGB 13.3 01/07/2017 09:50 AM    HCT 38.4 01/07/2017 09:50 AM     01/07/2017 09:50 AM     All Cardiac Markers in the last 24 hours:   Lab Results   Component Value Date/Time     01/07/2017 09:50 AM    CKMB <0.5 (L) 01/07/2017 09:50 AM    CKND1 CANNOT BE CALCULATED 01/07/2017 09:50 AM    TROIQ 0.02 01/07/2017 09:50 AM     Recent Glucose Results:   Lab Results   Component Value Date/Time     (H) 01/07/2017 09:50 AM     ABG:   Lab Results   Component Value Date/Time    PHI 7.495 (H) 01/07/2017 05:37 PM    PCO2I 33.3 (L) 01/07/2017 05:37 PM    PO2I 48 (LL) 01/07/2017 05:37 PM    HCO3I 25.7 01/07/2017 05:37 PM    FIO2I 36 01/07/2017 05:37 PM     COAGS: No results found for: APTT, PTP, INR  Liver Panel:   Lab Results   Component Value Date/Time    ALB 3.4 01/07/2017 09:50 AM    TP 7.9 01/07/2017 09:50 AM    GLOB 4.5 (H) 01/07/2017 09:50 AM    AGRAT 0.8 01/07/2017 09:50 AM    SGOT 36 01/07/2017 09:50 AM    ALT 22 01/07/2017 09:50 AM    AP 55 01/07/2017 09:50 AM     Pancreatic Markers:   Lab Results   Component Value Date/Time    LPSE 131 01/07/2017 09:50 AM       Procedures/imaging: see electronic medical records for all procedures/Xrays and details which were not copied into this note but were reviewed prior to creation of Mae Tirado MD, Internal Medicine     CC: Jonathan Calzada MD

## 2017-01-07 NOTE — IP AVS SNAPSHOT
[facility] 
 
 
 [address] [facility] [address] 
[phone] Patient: Mary Ace MRN: [id number] You are allergic to the following No active allergies Immunizations Administered for This Admission Name Date Influenza Vaccine (Quad) PF 1/11/2017 Recent Documentation Height Weight Breastfeeding? 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), Respiratory Distress, Acute (Hcc), Hypoxia, Htn (Hypertension), Meniere Disease, Asthma Exacerbation Providers Seen During Your Hospitalizations Provider Role Specialty Primary office phone Arsh Kat MD Attending Provider Family Practice [phone] Andrea Ramey MD Attending Provider Internal Medicine [phone] Your Primary Care Physician (PCP) Primary Care Physician Office Phone Office Fax OTHER, RHONDA ** None ** ** None ** Follow-up Information Follow up With Details Comments Contact Info Rhonda Calzada MD In 1 week  Patient can only remember the practice name and not the physician Current Discharge Medication List  
  
START taking these medications Dose & Instructions Dispensing Information Comments Morning Noon Evening Bedtime  
 albuterol 90 mcg/actuation inhaler Commonly known as:  PROVENTIL HFA, VENTOLIN HFA, PROAIR HFA Your next dose is: Today, Tomorrow Other:  _________ Dose:  1 Puff Take 1 Puff by inhalation every four (4) hours as needed for Wheezing. Quantity:  1 Inhaler Refills:  0  
     
   
   
   
  
 fluticasone-salmeterol 100-50 mcg/dose diskus inhaler Commonly known as:  ADVAIR DISKUS Your next dose is: Today, Tomorrow Other:  _________ Dose:  1 Puff Take 1 Puff by inhalation every twelve (12) hours. Quantity:  1 Inhaler Refills:  0  
     
   
   
   
  
 guaiFENesin  mg SR tablet Commonly known as:  Eran & Eran Your next dose is: Today, Tomorrow Other:  _________ Dose:  600 mg Take 1 Tab by mouth every twelve (12) hours. Quantity:  20 Tab Refills:  0  
     
   
   
   
  
 levoFLOXacin 500 mg tablet Commonly known as:  Lamount Chalet Your next dose is: Today, Tomorrow Other:  _________ Dose:  500 mg Take 1 Tab by mouth daily. Quantity:  3 Tab Refills:  0  
     
   
   
   
  
 predniSONE 10 mg tablet Commonly known as:  Castaner Fercho Your next dose is: Today, Tomorrow Other:  _________ Take 4 tabs x 2 days, 3 tabs x 2 days , 2 tabs x 2 days, 1 tab x 2 days, 0.5 tab x 2 days Quantity:  21 Tab Refills:  0 CONTINUE these medications which have CHANGED Dose & Instructions Dispensing Information Comments Morning Noon Evening Bedtime  
 insulin detemir 100 unit/mL injection Commonly known as:  LEVEMIR What changed:  how much to take Your next dose is: Today, Tomorrow Other:  _________ Dose:  15 Units 0.15 mL by SubCUTAneous route nightly. Quantity:  1 Vial  
Refills:  0  
     
   
   
   
  
 insulin lispro 100 unit/mL injection Commonly known as:  HumaLOG What changed:   
- how much to take - when to take this Your next dose is: Today, Tomorrow Other:  _________ Dose:  5 Units 5 Units by SubCUTAneous route Before breakfast, lunch, and dinner. Quantity:  1 Vial  
Refills:  0 CONTINUE these medications which have NOT CHANGED Dose & Instructions Dispensing Information Comments Morning Noon Evening Bedtime  
 enalapril 20 mg tablet Commonly known as:  Ramiro Morning Your next dose is: Today, Tomorrow Other:  _________ Dose:  20 mg Take 20 mg by mouth daily. Instructed to take morning of surgery with sip of water Refills:  0  
     
   
   
   
  
 levothyroxine 100 mcg tablet Commonly known as:  SYNTHROID Your next dose is: Today, Tomorrow Other:  _________ Dose:  100 mcg Take 100 mcg by mouth Daily (before breakfast). Instructed to take morning of surgery with sip of water. Refills:  0  
     
   
   
   
  
 meclizine 25 mg tablet Commonly known as:  ANTIVERT Your next dose is: Today, Tomorrow Other:  _________ Take 1 tablet by mouth every 6 hours for the next 3 days. Then stop taking the meclizine. Restart the meclizine for 3 day intervals if vertigo/ dizziness returns. Quantity:  20 Tab Refills:  0  
     
   
   
   
  
 simvastatin 40 mg tablet Commonly known as:  ZOCOR Your next dose is: Today, Tomorrow Other:  _________ Dose:  40 mg Take 40 mg by mouth nightly. Refills:  0 Where to Get Your Medications These medications were sent to [address], [address] [facility] - [address] [address] [place] [address] [address] [facility] [address] Phone:  [phone] insulin detemir 100 unit/mL injection Information on where to get these meds will be given to you by the nurse or doctor. ! Ask your nurse or doctor about these medications albuterol 90 mcg/actuation inhaler  
 fluticasone-salmeterol 100-50 mcg/dose diskus inhaler  
 guaiFENesin  mg SR tablet  
 insulin lispro 100 unit/mL injection  
 levoFLOXacin 500 mg tablet  
 predniSONE 10 mg tablet Discharge Instructions Learning About High Blood Pressure What is high blood pressure? Blood pressure is a measure of how hard the blood pushes against the walls of your arteries. It's normal for blood pressure to go up and down throughout the day, but if it stays up, you have high blood pressure. Another name for high blood pressure is hypertension. Two numbers tell you your blood pressure. The first number is the systolic pressure. It shows how hard the blood pushes when your heart is pumping. The second number is the diastolic pressure. It shows how hard the blood pushes between heartbeats, when your heart is relaxed and filling with blood. A blood pressure of less than 120/80 (say \"120 over 80\") is ideal for an adult. High blood pressure is 140/90 or higher. You have high blood pressure if your top number is 140 or higher or your bottom number is 90 or higher, or both. Many people fall into the category in between, called prehypertension. People with prehypertension need to make lifestyle changes to bring their blood pressure down and help prevent or delay high blood pressure. What happens when you have high blood pressure? · Blood flows through your arteries with too much force. Over time, this damages the walls of your arteries. But you can't feel it. High blood pressure usually doesn't cause symptoms. · Fat and calcium start to build up in your arteries. This buildup is called plaque. Plaque makes your arteries narrower and stiffer. Blood can't flow through them as easily. · This lack of good blood flow starts to damage some of the organs in your body.  This can lead to problems such as coronary artery disease and heart attack, heart failure, stroke, kidney failure, and eye damage. How can you prevent high blood pressure? · Stay at a healthy weight. · Try to limit how much sodium you eat to less than 2,300 milligrams (mg) a day. If you limit your sodium to 1,500 mg a day, you can lower your blood pressure even more. ¨ Buy foods that are labeled \"unsalted,\" \"sodium-free,\" or \"low-sodium. \" Foods labeled \"reduced-sodium\" and \"light sodium\" may still have too much sodium. ¨ Flavor your food with garlic, lemon juice, onion, vinegar, herbs, and spices instead of salt. Do not use soy sauce, steak sauce, onion salt, garlic salt, mustard, or ketchup on your food. ¨ Use less salt (or none) when recipes call for it. You can often use half the salt a recipe calls for without losing flavor. · Be physically active. Get at least 30 minutes of exercise on most days of the week. Walking is a good choice. You also may want to do other activities, such as running, swimming, cycling, or playing tennis or team sports. · Limit alcohol to 2 drinks a day for men and 1 drink a day for women. · Eat plenty of fruits, vegetables, and low-fat dairy products. Eat less saturated and total fats. How is high blood pressure treated? · Your doctor will suggest making lifestyle changes. For example, your doctor may ask you to eat healthy foods, quit smoking, lose extra weight, and be more active. · If lifestyle changes don't help enough or your blood pressure is very high, you will have to take medicine every day. Follow-up care is a key part of your treatment and safety. Be sure to make and go to all appointments, and call your doctor if you are having problems. It's also a good idea to know your test results and keep a list of the medicines you take. Where can you learn more? Go to http://hakeem-mehreen.info/. Enter P501 in the search box to learn more about \"Learning About High Blood Pressure. \" Current as of: March 23, 2016 Content Version: 11.1 © 8796-4948 [employer]. Care instructions adapted under license by [employer] (which disclaims liability or warranty for this information). If you have questions about a medical condition or this instruction, always ask your healthcare professional. [address] any warranty or liability for your use of this information. Pneumonia: Care Instructions Your Care Instructions Pneumonia is an infection of the lungs. Most cases are caused by infections from bacteria or viruses. Pneumonia may be mild or very severe. If it is caused by bacteria, you will be treated with antibiotics. It may take a few weeks to a few months to recover fully from pneumonia, depending on how sick you were and whether your overall health is good. Follow-up care is a key part of your treatment and safety. Be sure to make and go to all appointments, and call your doctor if you are having problems. Its also a good idea to know your test results and keep a list of the medicines you take. How can you care for yourself at home? · Take your antibiotics exactly as directed. Do not stop taking the medicine just because you are feeling better. You need to take the full course of antibiotics. · Take your medicines exactly as prescribed. Call your doctor if you think you are having a problem with your medicine. · Get plenty of rest and sleep. You may feel weak and tired for a while, but your energy level will improve with time. · To prevent dehydration, drink plenty of fluids, enough so that your urine is light yellow or clear like water. Choose water and other caffeine-free clear liquids until you feel better. If you have kidney, heart, or liver disease and have to limit fluids, talk with your doctor before you increase the amount of fluids you drink.  
· Take care of your cough so you can rest. A cough that brings up mucus from your lungs is common with pneumonia. It is one way your body gets rid of the infection. But if coughing keeps you from resting or causes severe fatigue and chest-wall pain, talk to your doctor. He or she may suggest that you take a medicine to reduce the cough. · Use a vaporizer or humidifier to add moisture to your bedroom. Follow the directions for cleaning the machine. · Do not smoke or allow others to smoke around you. Smoke will make your cough last longer. If you need help quitting, talk to your doctor about stop-smoking programs and medicines. These can increase your chances of quitting for good. · Take an over-the-counter pain medicine, such as acetaminophen (Tylenol), ibuprofen (Advil, Motrin), or naproxen (Aleve). Read and follow all instructions on the label. · Do not take two or more pain medicines at the same time unless the doctor told you to. Many pain medicines have acetaminophen, which is Tylenol. Too much acetaminophen (Tylenol) can be harmful. · If you were given a spirometer to measure how well your lungs are working, use it as instructed. This can help your doctor tell how your recovery is going. · To prevent pneumonia in the future, talk to your doctor about getting a flu vaccine (once a year) and a pneumococcal vaccine (one time only for most people). When should you call for help? Call 911 anytime you think you may need emergency care. For example, call if: 
· You have severe trouble breathing. Call your doctor now or seek immediate medical care if: 
· You cough up dark brown or bloody mucus (sputum). · You have new or worse trouble breathing. · You are dizzy or lightheaded, or you feel like you may faint. Watch closely for changes in your health, and be sure to contact your doctor if: 
· You have a new or higher fever. · You are coughing more deeply or more often. · You are not getting better after 2 days (48 hours). · You do not get better as expected. Where can you learn more? Go to http://hakeem-mehreen.info/. Enter [id number] in the search box to learn more about \"Pneumonia: Care Instructions. \" Current as of: May 23, 2016 Content Version: 11.1 © 2384-2630 [employer]. Care instructions adapted under license by Ruth Kunstadter â€“ [employer] (which disclaims liability or warranty for this information). If you have questions about a medical condition or this instruction, always ask your healthcare professional. [address] any warranty or liability for your use of this information. [employer] Activation Thank you for requesting access to [employer]. Please follow the instructions below to securely access and download your online medical record. [employer] allows you to send messages to your doctor, view your test results, renew your prescriptions, schedule appointments, and more. How Do I Sign Up? 1. In your internet browser, go to [email or web address] 
2. Click on the First Time User? Click Here link in the Sign In box. You will be redirect to the New Member Sign Up page. 3. Enter your [employer] Access Code exactly as it appears below. You will not need to use this code after youve completed the sign-up process. If you do not sign up before the expiration date, you must request a new code. [employer] Access Code: [email or web address] Expires: 2017 10:07 AM (This is the date your [employer] access code will ) 4. Enter the last four digits of your Social Security Number (xxxx) and Date of Birth (mm/dd/yyyy) as indicated and click Submit. You will be taken to the next sign-up page. 5. Create a [employer] ID. This will be your [employer] login ID and cannot be changed, so think of one that is secure and easy to remember. 6. Create a [employer] password. You can change your password at any time. 7. Enter your Password Reset Question and Answer. This can be used at a later time if you forget your password. 8. Enter your e-mail address. You will receive e-mail notification when new information is available in [address] 9. Click Sign Up. You can now view and download portions of your medical record. 10. Click the Download Summary menu link to download a portable copy of your medical information. Additional Information If you have questions, please visit the Frequently Asked Questions section of the [employer] website at [email or web address] Remember, [employer]TV is NOT to be used for urgent needs. For medical emergencies, dial 911. Patient armband removed and shredded Discharge Orders None UiTV Announcement We are excited to announce that we are making your provider's discharge notes available to you in [employer]TV. You will see these notes when they are completed and signed by the physician that discharged you from your recent hospital stay. If you have any questions or concerns about any information you see in UiTV, please call the Health Information Department where you were seen or reach out to your Primary Care Provider for more information about your plan of care. Introducing [facility]! Stephanie Syed introduces [employer]TV patient portal. Now you can access parts of your medical record, email your doctor's office, and request medication refills online. 1. In your internet browser, go to [email or web address] 2. Click on the First Time User? Click Here link in the Sign In box. You will see the New Member Sign Up page. 3. Enter your [employer]TV Access Code exactly as it appears below. You will not need to use this code after youve completed the sign-up process. If you do not sign up before the expiration date, you must request a new code. · [employer]TV Access Code: [email or web address] Expires: 4/7/2017 10:07 AM 
 
4.  Enter the last four digits of your Social Security Number (xxxx) and Date of Birth (mm/dd/yyyy) as indicated and click Submit. You will be taken to the next sign-up page. 5. Create a [employer] ID. This will be your [employer] login ID and cannot be changed, so think of one that is secure and easy to remember. 6. Create a [employer] password. You can change your password at any time. 7. Enter your Password Reset Question and Answer. This can be used at a later time if you forget your password. 8. Enter your e-mail address. You will receive e-mail notification when new information is available in [address] 9. Click Sign Up. You can now view and download portions of your medical record. 10. Click the Download Summary menu link to download a portable copy of your medical information. If you have questions, please visit the Frequently Asked Questions section of the [employer] website. Remember, [employer] is NOT to be used for urgent needs. For medical emergencies, dial 911. Now available from your iPhone and Android! General Information Please provide this summary of care documentation to your next provider. Patient Signature:  ____________________________________________________________ Date:  ____________________________________________________________  
  
Miller Hou Provider Signature:  ____________________________________________________________ Date:  ____________________________________________________________

## 2017-01-07 NOTE — PROGRESS NOTES
STAT ABG OBTAINED WITH PATIENT ON 4L NC.  PO2 48 ON SAME. INCREASED TO 6L.   PAGING DR. Mandi Hartmann.

## 2017-01-07 NOTE — ED PROVIDER NOTES
HPI Comments:   9:36 AM   Diane Stephen is a 77 y.o. female with dx of vertigo presents to ED via EMS C/O dizziness (room-spinning) onset upon waking up today. Pt also states she was returning to town from [place], and had N/V/D since (2 days ago). Assx sxs include generalized abd pain. Rates pain 9/10 in ED. Last BM was diarrhea 2 days ago. PMHx includes hypercholesteremia, HTN and DM. PShx includes tubal ligation. Pt denies any other sxs or complaints. Patient is a 77 y.o. female presenting with dizziness. The history is provided by the patient. No  was used. Dizziness   This is a new problem. The problem has not changed since onset. Associated symptoms include vomiting and nausea. Written by VIJAY Yeager, as dictated by Leslye Rodriguez MD.    Past Medical History:   Diagnosis Date    Chronic pain      low back pain    Diabetes ([facility] [address] 2001     type II, was on oral agents for a while    Hypercholesteremia     Hypertension     Ill-defined condition      kidny stones    Thyroid disease        Past Surgical History:   Procedure Laterality Date    Hx gyn  1977     tubal ligation         Family History:   Problem Relation Age of Onset    Cancer Mother        Social History     Social History    Marital status: SINGLE     Spouse name: N/A    Number of children: N/A    Years of education: N/A     Occupational History    Not on file. Social History Main Topics    Smoking status: Former Smoker     Quit date: 6/6/1998    Smokeless tobacco: Not on file    Alcohol use Yes      Comment: rare    Drug use: No    Sexual activity: Not on file     Other Topics Concern    Not on file     Social History Narrative         ALLERGIES: Review of patient's allergies indicates no known allergies. Review of Systems   Gastrointestinal: Positive for abdominal pain (generalized), diarrhea, nausea and vomiting. Neurological: Positive for dizziness (room-spinning).    All other systems reviewed and are negative. Vitals:    01/07/17 1245 01/07/17 1429 01/07/17 1630 01/07/17 1705   BP: 138/65  151/64 150/64   Pulse: 80  86 87   Resp: 16  18 18   Temp:    (!) 102 °F (38.9 °C)   SpO2: 90% 91% 92%    Weight:       Height:                Physical Exam   Constitutional: She is oriented to person, place, and time. She appears well-developed and well-nourished. HENT:   Head: Normocephalic and atraumatic. Eyes: Pupils are equal, round, and reactive to light. Neck: Neck supple. Cardiovascular: Normal rate, regular rhythm, S1 normal, S2 normal and normal heart sounds. Pulmonary/Chest: Breath sounds normal. No respiratory distress. She has no wheezes. She has no rales. She exhibits no tenderness. Abdominal: Soft. She exhibits no distension and no mass. There is tenderness in the right upper quadrant. There is no guarding. Musculoskeletal: Normal range of motion. She exhibits no edema or tenderness. Neurological: She is alert and oriented to person, place, and time. No cranial nerve deficit. Skin: No rash noted. Psychiatric: She has a normal mood and affect. Her behavior is normal. Thought content normal.   Nursing note and vitals reviewed. RESULTS:  PULSE OXIMETRY NOTE:  9:40 AM  Pulse-Ox is 91% on room-air. Interpretation: hypoxic  Intervention: observation and Nebulizer treatment   Written by VIJAY Anne, as dictated by Lee Colvin MD.    EKG interpretation: (Preliminary)  9:54 AM   NSR. 81 bpm. Normal ECG. EKG read by Lee Colvin MD at 9:42 AM        CT ABD PELV WO CONT   Final Result   IMPRESSION:     Bilateral patchy areas of discoid atelectasis     Small faint hazy density along the dependent wall the gallbladder correlating  with the site of echogenic structure on sonography.  Differential again includes  adherent sludge ball and polyp.     Densely calcified uterine fibroids     Possible small hiatal hernia     .As read by the anna marie Winslow   Final Result   IMPRESSION:     Echogenic structure noted within the gallbladder lumen which did not move and it  appeared attached to the dependent wall of the gallbladder. Differential  includes an adherent sludge ball and a gallbladder polyp/mass. Outpatient  follow-up would be helpful with possible CT or MR     No acute abnormality demonstrated    As read by the radiologist.   XR CHEST PORT    (final results)  IMPRESSION:      Focal infiltrate evident at the left lung base.         As read by the radiologist    CTA chest w wo cont  IMPRESSION:     1. No evidence of pulmonary embolism.     2. Prominent bandlike opacities in middle lobes, lingula and right middle lobe  either atelectasis and/or pneumonia. Mild cardiomegaly.     3. Diffusely nodular thyroid.   As read by the radiologist.    Labs Reviewed   CARDIAC PANEL,(CK, CKMB & TROPONIN) - Abnormal; Notable for the following:        Result Value    CK - MB <0.5 (*)     All other components within normal limits   CBC WITH AUTOMATED DIFF - Abnormal; Notable for the following:     NEUTROPHILS 74 (*)     LYMPHOCYTES 14 (*)     MONOCYTES 12 (*)     All other components within normal limits   METABOLIC PANEL, COMPREHENSIVE - Abnormal; Notable for the following:     Sodium 135 (*)     Chloride 98 (*)     Glucose 144 (*)     BUN/Creatinine ratio 9 (*)     Globulin 4.5 (*)     All other components within normal limits   URINALYSIS W/ RFLX MICROSCOPIC - Abnormal; Notable for the following:     Protein 30 (*)     Ketone TRACE (*)     All other components within normal limits   URINE MICROSCOPIC ONLY - Abnormal; Notable for the following:     Mucus 1+ (*)     Amorphous Crystals 1+ (*)     All other components within normal limits   CULTURE, BLOOD   LIPASE   BLOOD GAS, ARTERIAL   HEMOGLOBIN A1C WITH EAG       Recent Results (from the past 12 hour(s))   EKG, 12 LEAD, INITIAL    Collection Time: 01/07/17  9:42 AM   Result Value Ref Range Ventricular Rate 81 BPM    Atrial Rate 81 BPM    P-R Interval 114 ms    QRS Duration 116 ms    Q-T Interval 376 ms    QTC Calculation (Bezet) 436 ms    Calculated P Axis -2 degrees    Calculated R Axis 24 degrees    Calculated T Axis 57 degrees    Diagnosis       Normal sinus rhythm  Normal ECG  When compared with ECG of 31-JUL-2016 23:05,  No significant change was found     CARDIAC PANEL,(CK, CKMB & TROPONIN)    Collection Time: 01/07/17  9:50 AM   Result Value Ref Range     26 - 192 U/L    CK - MB <0.5 (L) 0.5 - 3.6 ng/ml    CK-MB Index CANNOT BE CALCULATED 0.0 - 4.0 %    Troponin-I, Qt. 0.02 0.00 - 0.06 NG/ML   CBC WITH AUTOMATED DIFF    Collection Time: 01/07/17  9:50 AM   Result Value Ref Range    WBC 6.4 4.6 - 13.2 K/uL    RBC 4.40 4.20 - 5.30 M/uL    HGB 13.3 12.0 - 16.0 g/dL    HCT 38.4 35.0 - 45.0 %    MCV 87.3 74.0 - 97.0 FL    MCH 30.2 24.0 - 34.0 PG    MCHC 34.6 31.0 - 37.0 g/dL    RDW 12.6 11.6 - 14.5 %    PLATELET 086 875 - 770 K/uL    MPV 9.9 9.2 - 11.8 FL    NEUTROPHILS 74 (H) 40 - 73 %    LYMPHOCYTES 14 (L) 21 - 52 %    MONOCYTES 12 (H) 3 - 10 %    EOSINOPHILS 0 0 - 5 %    BASOPHILS 0 0 - 2 %    ABS. NEUTROPHILS 4.7 1.8 - 8.0 K/UL    ABS. LYMPHOCYTES 0.9 0.9 - 3.6 K/UL    ABS. MONOCYTES 0.8 0.05 - 1.2 K/UL    ABS. EOSINOPHILS 0.0 0.0 - 0.4 K/UL    ABS.  BASOPHILS 0.0 0.0 - 0.06 K/UL    DF AUTOMATED     LIPASE    Collection Time: 01/07/17  9:50 AM   Result Value Ref Range    Lipase 131 73 - 115 U/L   METABOLIC PANEL, COMPREHENSIVE    Collection Time: 01/07/17  9:50 AM   Result Value Ref Range    Sodium 135 (L) 136 - 145 mmol/L    Potassium 4.5 3.5 - 5.5 mmol/L    Chloride 98 (L) 100 - 108 mmol/L    CO2 30 21 - 32 mmol/L    Anion gap 7 3.0 - 18 mmol/L    Glucose 144 (H) 74 - 99 mg/dL    BUN 8 7.0 - 18 MG/DL    Creatinine 0.87 0.6 - 1.3 MG/DL    BUN/Creatinine ratio 9 (L) 12 - 20      GFR est AA >60 >60 ml/min/1.73m2    GFR est non-AA >60 >60 ml/min/1.73m2    Calcium 8.6 8.5 - 10.1 MG/DL Bilirubin, total 0.6 0.2 - 1.0 MG/DL    ALT 22 13 - 56 U/L    AST 36 15 - 37 U/L    Alk. phosphatase 55 45 - 117 U/L    Protein, total 7.9 6.4 - 8.2 g/dL    Albumin 3.4 3.4 - 5.0 g/dL    Globulin 4.5 (H) 2.0 - 4.0 g/dL    A-G Ratio 0.8 0.8 - 1.7     URINALYSIS W/ RFLX MICROSCOPIC    Collection Time: 01/07/17 12:35 PM   Result Value Ref Range    Color YELLOW      Appearance CLEAR      Specific gravity 1.017 1.005 - 1.030      pH (UA) 5.0 5.0 - 8.0      Protein 30 (A) NEG mg/dL    Glucose NEGATIVE  NEG mg/dL    Ketone TRACE (A) NEG mg/dL    Bilirubin NEGATIVE  NEG      Blood NEGATIVE  NEG      Urobilinogen 0.2 0.2 - 1.0 EU/dL    Nitrites NEGATIVE  NEG      Leukocyte Esterase NEGATIVE  NEG     URINE MICROSCOPIC ONLY    Collection Time: 01/07/17 12:35 PM   Result Value Ref Range    WBC 0 to 1 0 - 5 /hpf    RBC 0 to 1 0 - 5 /hpf    Epithelial cells 1+ 0 - 5 /lpf    Bacteria NEGATIVE  NEG /hpf    Mucus 1+ (A) NEG /lpf    Amorphous Crystals 1+ (A) NEG       MDM  Number of Diagnoses or Management Options  Pneumonia due to infectious organism, unspecified laterality, unspecified part of lung:   Diagnosis management comments: INITIAL CLINICAL IMPRESSION and PLANS:  The patient presents with the primary complaint(s) of: dizziness and RUQ tenderness. The presentation, to include historical aspects and clinical findings are consistent with the DX of vertigo. However, other possible DX's to consider as primary, associated with, or exacerbated by include:    1.  labyrinthitis  2. Electrolyte abnormality  3. ACS  4.  Gall bladder disease  5. Gastritis  6. Diverticulitis   7. Pancreatitis   8. UTI  9.  Dehydration    Recorded by Jordan Nielsen ED Scribe, as dictated by Errol Dakin, MD.       Amount and/or Complexity of Data Reviewed  Clinical lab tests: reviewed and ordered  Tests in the radiology section of CPT®: reviewed and ordered (CXR, US gall bladder, CT abd pelv wo cont, CTA chest W WO CONT)  Tests in the medicine section of CPT®: reviewed and ordered (EKG)  Discuss the patient with other providers: yes Ova MD Farzana (hospitalist))  Independent visualization of images, tracings, or specimens: yes (EKG, CXR)    Critical Care  Total time providing critical care: 30-74 minutes    ED Course     Medications   sodium chloride (NS) flush 5-10 mL (not administered)   sodium chloride (NS) flush 5-10 mL (not administered)   cefTRIAXone (ROCEPHIN) 1 g in 0.9% sodium chloride (MBP/ADV) 50 mL MBP (0 g IntraVENous IV Completed 1/7/17 1721)   acetaminophen (TYLENOL) tablet 650 mg (not administered)   piperacillin-tazobactam (ZOSYN) 3.375 g in 0.9% sodium chloride (MBP/ADV) 100 mL MBP (not administered)   albuterol-ipratropium (DUO-NEB) 2.5 MG-0.5 MG/3 ML (not administered)   insulin detemir (LEVEMIR) injection 20 Units (not administered)   insulin lispro (HUMALOG) injection (not administered)   glucose chewable tablet 16 g (not administered)   glucagon (GLUCAGEN) injection 1 mg (not administered)   dextrose (D50W) injection syrg 12.5-25 g (not administered)   sodium chloride 0.9 % bolus infusion 1,000 mL (0 mL IntraVENous IV Completed 1/7/17 1135)   ondansetron (ZOFRAN) injection 4 mg (4 mg IntraVENous Given 1/7/17 0959)   sodium chloride 0.9 % bolus infusion 500 mL (0 mL IntraVENous IV Completed 1/7/17 1452)   meclizine (ANTIVERT) tablet 25 mg (25 mg Oral Given 1/7/17 1423)   albuterol-ipratropium (DUO-NEB) 2.5 MG-0.5 MG/3 ML (3 mL Nebulization Given 1/7/17 1428)   iopamidol (ISOVUE-370) 76 % injection  mL (100 mL IntraVENous Given 1/7/17 1519)   albuterol (PROVENTIL VENTOLIN) nebulizer solution 5 mg (5 mg Nebulization Given 1/7/17 1709)   acetaminophen (TYLENOL) tablet 650 mg (650 mg Oral Given 1/7/17 1713)       Procedures  PROGRESS NOTE:  9:36 AM   Initial assessment performed. Written by Nelsy Solomon ED Scribe, as dictated by Monica Rosa MD.    PROGRESS NOTE:  2:01 PM  Pt's pulse ox dropped to 88% on room air.  In repeat exam, pt is wheezing. No CP at this time. And due to hx of recent long distance travels, concern about possible PE. Probability for PE is intermediate and PERC rule is not negative. Will obtain CTA chest.  Written by VIJAY Paez, as dictated by Ladonna Wayne MD.    CONSULT NOTE:   4:23 PM  Ladonna Wayne MD spoke with Gabby Mcnally MD   Specialty: hospitalist  Discussed pt's hx, disposition, and available diagnostic and imaging results. Reviewed care plans. Consulting physician agrees with plans as outlined. She agrees with admission to telemetry. Written by VIJAY Paezibe, as dictated by Ladonna Wayne MD    ADMISSION NOTE:  4:23 PM  Patient is being admitted to the hospital by Gabby Mcnally MD. The results of their tests and reasons for their admission have been discussed with them and/or available family. They convey agreement and understanding for the need to be admitted and for their admission diagnosis. Written by VIJAY Paez, as dictated by Ladonna Wayne MD.    PROGRESS NOTE:  4:40 PM  Pt presented in ED for dizziness, initial pulse ox was 91% in room air, no fever, no wheezing. However, pt was orthostatic. Pt was hydrated with 1L normal saline. On repeated exam, noted to be wheezing and pulse on 2L was 92%. She was tender in RUQ and required US of gall bladder which did not show stones but possible polyp. Because of recent trip with some hypoxia, PE was suspected and CTA was obtained which showed some PNA. Pt had been coughing for the last 6 days. No CP. Cough was initially dried but progressed to white sputum productive cough and wheezing did not improve with nebulizer treatment and she continued to be hypoxic. Because of this and PNA, pt will be admitted with IV ABX and nebulizer treatment. CONDITIONS ON ADMISSION:  Deep Vein Thrombosis is not present at the time of admission. Thrombosis is not present at the time of admission.  Urinary Tract Infection is not present at the time of admission. Pneumonia is present at the time of admission. MRSA is not present at the time of admission. Wound infection is not present at the time of admission. Pressure Ulcer is not present at the time of admission. PROGRESS NOTE:  5:14 PM  Pt ambulated to the bathroom. Pt is reporting of black stool. Pt got dizzy again and pulse ox dropped to 85% on room-air. Rectal exam: Dark green stool, heme negative. Pt was placed back on 2L of O2 and 5mg of Albuterol. CLINICAL IMPRESSION    1. Pneumonia due to infectious organism, unspecified laterality, unspecified part of lung          ATTESTATIONS:  This note is prepared by Jordan Nielsen, acting as Scribe for Errol Dakin, MD.    Errol Dakin, MD: The scribe's documentation has been prepared under my direction and personally reviewed by me in its entirety. I confirm that the note above accurately reflects all work, treatment, procedures, and medical decision making performed by me.     5:04 PM  I have spent 45 minutes of critical care time involved in lab review, consultations with specialist, family decision-making, and documentation. During this entire length of time I was immediately available to the patient. Critical Care: The reason for providing this level of medical care for this critically ill patient was due a critical illness that impaired one or more vital organ systems such that there was a high probability of imminent or life threatening deterioration in the patients condition. This care involved high complexity decision making to assess, manipulate, and support vital system functions, to treat this degreee vital organ system failure and to prevent further life threatening deterioration of the patients condition.

## 2017-01-07 NOTE — ED NOTES
Pt ambulated to restroom,  Pulse ox was at 85% on RA, pt had a black stool,  Dr Phyllis Reynolds aware, occult neg per dr Phyllis Reynolds

## 2017-01-08 PROBLEM — H81.09 MENIERE DISEASE: Status: ACTIVE | Noted: 2017-01-08

## 2017-01-08 PROBLEM — J45.901 ASTHMA EXACERBATION: Status: ACTIVE | Noted: 2017-01-08

## 2017-01-08 LAB
ANION GAP BLD CALC-SCNC: 12 MMOL/L (ref 3–18)
ATRIAL RATE: 81 BPM
BASOPHILS # BLD AUTO: 0 K/UL (ref 0–0.06)
BASOPHILS # BLD: 0 % (ref 0–2)
BUN SERPL-MCNC: 10 MG/DL (ref 7–18)
BUN/CREAT SERPL: 9 (ref 12–20)
CALCIUM SERPL-MCNC: 8.4 MG/DL (ref 8.5–10.1)
CALCULATED P AXIS, ECG09: -2 DEGREES
CALCULATED R AXIS, ECG10: 24 DEGREES
CALCULATED T AXIS, ECG11: 57 DEGREES
CHLORIDE SERPL-SCNC: 104 MMOL/L (ref 100–108)
CO2 SERPL-SCNC: 26 MMOL/L (ref 21–32)
CREAT SERPL-MCNC: 1.14 MG/DL (ref 0.6–1.3)
DIAGNOSIS, 93000: NORMAL
DIFFERENTIAL METHOD BLD: ABNORMAL
EOSINOPHIL # BLD: 0 K/UL (ref 0–0.4)
EOSINOPHIL NFR BLD: 0 % (ref 0–5)
ERYTHROCYTE [DISTWIDTH] IN BLOOD BY AUTOMATED COUNT: 12.8 % (ref 11.6–14.5)
GLUCOSE BLD STRIP.AUTO-MCNC: 224 MG/DL (ref 70–110)
GLUCOSE BLD STRIP.AUTO-MCNC: 253 MG/DL (ref 70–110)
GLUCOSE BLD STRIP.AUTO-MCNC: 261 MG/DL (ref 70–110)
GLUCOSE BLD STRIP.AUTO-MCNC: 347 MG/DL (ref 70–110)
GLUCOSE BLD STRIP.AUTO-MCNC: 352 MG/DL (ref 70–110)
GLUCOSE SERPL-MCNC: 292 MG/DL (ref 74–99)
HCT VFR BLD AUTO: 38 % (ref 35–45)
HGB BLD-MCNC: 12.7 G/DL (ref 12–16)
LYMPHOCYTES # BLD AUTO: 6 % (ref 21–52)
LYMPHOCYTES # BLD: 0.6 K/UL (ref 0.9–3.6)
MAGNESIUM SERPL-MCNC: 1.6 MG/DL (ref 1.8–2.4)
MCH RBC QN AUTO: 29.5 PG (ref 24–34)
MCHC RBC AUTO-ENTMCNC: 33.4 G/DL (ref 31–37)
MCV RBC AUTO: 88.4 FL (ref 74–97)
MONOCYTES # BLD: 0.4 K/UL (ref 0.05–1.2)
MONOCYTES NFR BLD AUTO: 4 % (ref 3–10)
NEUTS SEG # BLD: 8.7 K/UL (ref 1.8–8)
NEUTS SEG NFR BLD AUTO: 90 % (ref 40–73)
P-R INTERVAL, ECG05: 114 MS
PLATELET # BLD AUTO: 184 K/UL (ref 135–420)
PMV BLD AUTO: 10.2 FL (ref 9.2–11.8)
POTASSIUM SERPL-SCNC: 3.6 MMOL/L (ref 3.5–5.5)
Q-T INTERVAL, ECG07: 376 MS
QRS DURATION, ECG06: 116 MS
QTC CALCULATION (BEZET), ECG08: 436 MS
RBC # BLD AUTO: 4.3 M/UL (ref 4.2–5.3)
RBC MORPH BLD: ABNORMAL
SODIUM SERPL-SCNC: 142 MMOL/L (ref 136–145)
VENTRICULAR RATE, ECG03: 81 BPM
WBC # BLD AUTO: 9.7 K/UL (ref 4.6–13.2)

## 2017-01-08 PROCEDURE — 74011000250 HC RX REV CODE- 250: Performed by: HOSPITALIST

## 2017-01-08 PROCEDURE — 82962 GLUCOSE BLOOD TEST: CPT

## 2017-01-08 PROCEDURE — 83735 ASSAY OF MAGNESIUM: CPT | Performed by: HOSPITALIST

## 2017-01-08 PROCEDURE — 65660000000 HC RM CCU STEPDOWN

## 2017-01-08 PROCEDURE — 77010033678 HC OXYGEN DAILY

## 2017-01-08 PROCEDURE — 85025 COMPLETE CBC W/AUTO DIFF WBC: CPT | Performed by: HOSPITALIST

## 2017-01-08 PROCEDURE — 80048 BASIC METABOLIC PNL TOTAL CA: CPT | Performed by: HOSPITALIST

## 2017-01-08 PROCEDURE — 97116 GAIT TRAINING THERAPY: CPT

## 2017-01-08 PROCEDURE — 74011250637 HC RX REV CODE- 250/637: Performed by: HOSPITALIST

## 2017-01-08 PROCEDURE — 36415 COLL VENOUS BLD VENIPUNCTURE: CPT | Performed by: HOSPITALIST

## 2017-01-08 PROCEDURE — 74011636637 HC RX REV CODE- 636/637: Performed by: HOSPITALIST

## 2017-01-08 PROCEDURE — 74011000258 HC RX REV CODE- 258: Performed by: HOSPITALIST

## 2017-01-08 PROCEDURE — 97161 PT EVAL LOW COMPLEX 20 MIN: CPT

## 2017-01-08 PROCEDURE — 94640 AIRWAY INHALATION TREATMENT: CPT

## 2017-01-08 PROCEDURE — 74011250636 HC RX REV CODE- 250/636: Performed by: HOSPITALIST

## 2017-01-08 RX ORDER — BUDESONIDE 0.5 MG/2ML
500 INHALANT ORAL
Status: DISCONTINUED | OUTPATIENT
Start: 2017-01-08 | End: 2017-01-11 | Stop reason: HOSPADM

## 2017-01-08 RX ORDER — MAGNESIUM SULFATE HEPTAHYDRATE 40 MG/ML
2 INJECTION, SOLUTION INTRAVENOUS ONCE
Status: COMPLETED | OUTPATIENT
Start: 2017-01-08 | End: 2017-01-08

## 2017-01-08 RX ORDER — MECLIZINE HCL 12.5 MG 12.5 MG/1
25 TABLET ORAL
Status: DISCONTINUED | OUTPATIENT
Start: 2017-01-08 | End: 2017-01-11 | Stop reason: HOSPADM

## 2017-01-08 RX ORDER — INSULIN LISPRO 100 [IU]/ML
6 INJECTION, SOLUTION INTRAVENOUS; SUBCUTANEOUS
Status: DISCONTINUED | OUTPATIENT
Start: 2017-01-08 | End: 2017-01-10

## 2017-01-08 RX ADMIN — INSULIN LISPRO 6 UNITS: 100 INJECTION, SOLUTION INTRAVENOUS; SUBCUTANEOUS at 14:41

## 2017-01-08 RX ADMIN — Medication 10 ML: at 18:00

## 2017-01-08 RX ADMIN — INSULIN LISPRO 6 UNITS: 100 INJECTION, SOLUTION INTRAVENOUS; SUBCUTANEOUS at 06:38

## 2017-01-08 RX ADMIN — SODIUM CHLORIDE 750 MG: 900 INJECTION, SOLUTION INTRAVENOUS at 14:45

## 2017-01-08 RX ADMIN — ACETAMINOPHEN 650 MG: 325 TABLET, FILM COATED ORAL at 18:11

## 2017-01-08 RX ADMIN — Medication 10 ML: at 21:20

## 2017-01-08 RX ADMIN — PIPERACILLIN AND TAZOBACTAM 3.38 G: 3; .375 INJECTION, POWDER, FOR SOLUTION INTRAVENOUS at 04:43

## 2017-01-08 RX ADMIN — GUAIFENESIN 600 MG: 600 TABLET, EXTENDED RELEASE ORAL at 10:02

## 2017-01-08 RX ADMIN — MAGNESIUM SULFATE HEPTAHYDRATE 2 G: 40 INJECTION, SOLUTION INTRAVENOUS at 19:23

## 2017-01-08 RX ADMIN — SODIUM CHLORIDE 750 MG: 900 INJECTION, SOLUTION INTRAVENOUS at 21:19

## 2017-01-08 RX ADMIN — INSULIN DETEMIR 20 UNITS: 100 INJECTION, SOLUTION SUBCUTANEOUS at 21:40

## 2017-01-08 RX ADMIN — ENOXAPARIN SODIUM 40 MG: 40 INJECTION SUBCUTANEOUS at 01:01

## 2017-01-08 RX ADMIN — METHYLPREDNISOLONE SODIUM SUCCINATE 60 MG: 125 INJECTION, POWDER, FOR SOLUTION INTRAMUSCULAR; INTRAVENOUS at 06:38

## 2017-01-08 RX ADMIN — INSULIN LISPRO 9 UNITS: 100 INJECTION, SOLUTION INTRAVENOUS; SUBCUTANEOUS at 21:40

## 2017-01-08 RX ADMIN — BUDESONIDE 500 MCG: 0.5 SUSPENSION RESPIRATORY (INHALATION) at 19:53

## 2017-01-08 RX ADMIN — MAGNESIUM SULFATE HEPTAHYDRATE 2 G: 40 INJECTION, SOLUTION INTRAVENOUS at 20:02

## 2017-01-08 RX ADMIN — PIPERACILLIN AND TAZOBACTAM 3.38 G: 3; .375 INJECTION, POWDER, FOR SOLUTION INTRAVENOUS at 23:36

## 2017-01-08 RX ADMIN — IPRATROPIUM BROMIDE AND ALBUTEROL SULFATE 3 ML: .5; 3 SOLUTION RESPIRATORY (INHALATION) at 00:26

## 2017-01-08 RX ADMIN — PIPERACILLIN AND TAZOBACTAM 3.38 G: 3; .375 INJECTION, POWDER, FOR SOLUTION INTRAVENOUS at 18:00

## 2017-01-08 RX ADMIN — IPRATROPIUM BROMIDE AND ALBUTEROL SULFATE 3 ML: .5; 3 SOLUTION RESPIRATORY (INHALATION) at 12:09

## 2017-01-08 RX ADMIN — INSULIN LISPRO 6 UNITS: 100 INJECTION, SOLUTION INTRAVENOUS; SUBCUTANEOUS at 18:01

## 2017-01-08 RX ADMIN — IPRATROPIUM BROMIDE AND ALBUTEROL SULFATE 3 ML: .5; 3 SOLUTION RESPIRATORY (INHALATION) at 17:02

## 2017-01-08 RX ADMIN — IPRATROPIUM BROMIDE AND ALBUTEROL SULFATE 3 ML: .5; 3 SOLUTION RESPIRATORY (INHALATION) at 08:25

## 2017-01-08 RX ADMIN — INSULIN LISPRO 10 UNITS: 100 INJECTION, SOLUTION INTRAVENOUS; SUBCUTANEOUS at 18:01

## 2017-01-08 RX ADMIN — GUAIFENESIN 600 MG: 600 TABLET, EXTENDED RELEASE ORAL at 20:03

## 2017-01-08 RX ADMIN — IPRATROPIUM BROMIDE AND ALBUTEROL SULFATE 3 ML: .5; 3 SOLUTION RESPIRATORY (INHALATION) at 19:53

## 2017-01-08 RX ADMIN — ENOXAPARIN SODIUM 40 MG: 40 INJECTION SUBCUTANEOUS at 23:36

## 2017-01-08 RX ADMIN — INSULIN LISPRO 8 UNITS: 100 INJECTION, SOLUTION INTRAVENOUS; SUBCUTANEOUS at 14:41

## 2017-01-08 RX ADMIN — IPRATROPIUM BROMIDE AND ALBUTEROL SULFATE 3 ML: .5; 3 SOLUTION RESPIRATORY (INHALATION) at 04:34

## 2017-01-08 NOTE — ROUTINE PROCESS
Bedside and Verbal shift change report given to DAKOTAH Wells RN (oncoming nurse) by dEgar Leahy RN (offgoing nurse).  Report included the following information SBAR, Kardex, Intake/Output, MAR, Recent Results and Cardiac Rhythm SR.

## 2017-01-08 NOTE — ROUTINE PROCESS
Bedside and Verbal shift change report given to Veronica Huynh RN (oncoming nurse) by Tres Chen RN (offgoing nurse).  Report included the following information SBAR, Kardex, Intake/Output, MAR, Recent Results and Cardiac Rhythm SR

## 2017-01-08 NOTE — PROGRESS NOTES
2000: Assumed care. Pt awake, alert and oriented. Pt resting in bed with no acute signs of distress. Call bell and telephone within reach. White board updated. 2134:  Notified by pharmacy the zosyn was due at [id number]. Advised to give 2300 dose when due    2255:  Pt temp 102. 6. PRN tylenol adminstered    Shift Summary:  Pt temp resolved from the tylenol, otherwise uneventful shift. Pt in bed resting with no signs of distress or c/o pain.

## 2017-01-08 NOTE — ROUTINE PROCESS
Alarm parameters reviewed, on and audible Appropriate for patient clinical condition     1850:  Pt received up from ED alert and verbal without any signs of distress. Dtr at bedside.

## 2017-01-08 NOTE — PROGRESS NOTES
Hospitalist Progress Note-critical care note     Patient: Deni Rowan MRN: [id number]  CSN: [id number]    YOB: 1950  Age: 77 y.o. Sex: female    DOA: 1/7/2017 LOS:  LOS: 1 day            Chief complaint: hypoxia, pna dm , meniere disease ,asthma exacerbation     Assessment/Plan         Patient Active Problem List   Diagnosis Code    Ureteral stone N20.1    Pneumonia J18.9    Orthostasis I95.1    Dehydration N43.7    DM w/o complication type II ([address] E11.9    Respiratory distress, acute ([address] R06.00    Hypoxia R09.02    HTN (hypertension) I10     1 acute respiratory distress and hypoxia   Due to pna , will continue nc o2 and treat pna, improving, no on 3 L O2  2. PNA  Will continue  vanc and zosyn, breathing tx,  Short term iv steroid. Close monitor. Symptom treatment   3. DM type II   -long term insulin   -on levimer , ssi, diabetic diet , hypoglycemia protocol  add premeal insulin   4. HTN, accelerated  Continue home medication. 5.orthostasis and dehydration  Continue hydration   6 hx of meniere disease   Continue meclizine prn, fall precaution    7 asthma exacerbation (poa)  Continue steroid add pulmcort   8 hypomagnesemia   Mg replacement   Will have pt/ot  Subjective: sob improving   Nurse: no acute issue   Review of systems:    General: No fevers or chills. Cardiovascular: No chest pain or pressure. No palpitations. Pulmonary: No shortness of breath. Gastrointestinal: No nausea, vomiting. Vital signs/Intake and Output:  Visit Vitals    /54 (BP 1 Location: Right arm, BP Patient Position: At rest)    Pulse 74    Temp 97.9 °F (36.6 °C)    Resp 18    Ht 5' 3\" (1.6 m)    Wt 67.1 kg (148 lb)    SpO2 93%    BMI 26.22 kg/m2     Current Shift:     Last three shifts:  01/06 1901 - 01/08 0700  In: 100 [I.V.:100]  Out: 351 [Urine:350]    Physical Exam:  General: WD, WN. Alert, cooperative, no acute distress    HEENT: NC, Atraumatic.   PERRLA, anicteric sclerae. Lungs: Mild wheezing, rales   Heart:  Regular  rhythm,  No murmur, No Rubs, No Gallops  Abdomen: Soft, Non distended, Non tender.  +Bowel sounds,   Extremities: No c/c/e  Psych:   Not anxious or agitated. Neurologic:  No acute neurological deficit. Labs: Results:       Chemistry Recent Labs      01/08/17   0637  01/07/17   0950   GLU  292*  144*   NA  142  135*   K  3.6  4.5   CL  104  98*   CO2  26  30   BUN  10  8   CREA  1.14  0.87   CA  8.4*  8.6   AGAP  12  7   BUCR  9*  9*   AP   --   55   TP   --   7.9   ALB   --   3.4   GLOB   --   4.5*   AGRAT   --   0.8      CBC w/Diff Recent Labs      01/08/17   0637  01/07/17   0950   WBC  9.7  6.4   RBC  4.30  4.40   HGB  12.7  13.3   HCT  38.0  38.4   PLT  184  191   GRANS  90*  74*   LYMPH  6*  14*   EOS  0  0      Cardiac Enzymes Recent Labs      01/07/17   0950   CPK  170   CKND1  CANNOT BE CALCULATED      Coagulation No results for input(s): PTP, INR, APTT in the last 72 hours. No lab exists for component: INREXT    Lipid Panel No results found for: CHOL, CHOLPOCT, CHOLX, CHLST, CHOLV, [id number], HDL, LDL, NLDLCT, DLDL, LDLC, DLDLP, [id number], VLDLC, VLDL, TGL, TGLX, TRIGL, [id number], TRIGP, TGLPOCT, [id number], CHHD, CHHDX   BNP No results for input(s): BNPP in the last 72 hours.    Liver Enzymes Recent Labs      01/07/17   0950   TP  7.9   ALB  3.4   AP  55   SGOT  36      Thyroid Studies No results found for: T4, T3U, TSH, TSHEXT     Procedures/imaging: see electronic medical records for all procedures/Xrays and details which were not copied into this note but were reviewed prior to creation of Everardo Amaro MD

## 2017-01-08 NOTE — PROGRESS NOTES
Problem: Mobility Impaired (Adult and Pediatric)  Goal: *Acute Goals and Plan of Care (Insert Text)  Physical Therapy Goals  Initiated 1/8/2017 and to be accomplished within 3 day(s)      1. Patient will transfer from bed to chair and with supervision/set-up using no or LRAD for increased OOB activity. 2. Patient will perform sit to stand with supervision/set-up using no/LRAD for increased standing tolerance/prep for gait. 3. Patient will ambulate with supervision/set-up for 200 feet with no/LRAD for return to safe home mobility. 4. Patient will ascend/descend 3 stairs with either handrail w/ supervision/set-up for safe return to home mobility. PHYSICAL THERAPY EVALUATION     Patient: Joselito Hernandez (60 y.o. female)  Date: 1/8/2017  Primary Diagnosis: Pneumonia  Orthostasis        Precautions: vertigo; fall risk         ASSESSMENT :  Based on the objective data described below, the patient presents with decreased strength, endurance; general functional mobility deficits. Though reports of vertigo with admission, no such symptoms present during evaluation today. Patient is, however, a bit unsteady on her feet, able to walk a short distance into hallway, and fatigues easily. She is on 3 L O2 currently as well. Able to remain sitting up edge of bed and eat her dinner following session. Will follow during admission. Patient will benefit from skilled intervention to address the above impairments.   Patients rehabilitation potential is considered to be GOOD     Factors which may influence rehabilitation potential include:   [X]         None noted  [ ]         Mental ability/status  [ ]         Medical condition  [ ]         Home/family situation and support systems  [ ]         Safety awareness  [ ]         Pain tolerance/management  [ ]         Other:        PLAN :  Recommendations and Planned Interventions:  [ ]           Bed Mobility Training             [X]    Neuromuscular Re-Education  [X] Transfer Training                   [ ]    Orthotic/Prosthetic Training  [X]           Gait Training                          [ ]    Modalities  [X]           Therapeutic Exercises          [ ]    Edema Management/Control  [X]           Therapeutic Activities            [X]    Patient and Family Training/Education  [ ]           Other (comment):     Frequency/Duration: Patient will be followed by physical therapy daily to address goals. Discharge Recommendations: None  Further Equipment Recommendations for Discharge: N/A       SUBJECTIVE:   Patient stated Polo Pichardo. Yes, I have been up to the bathroom several times already.       OBJECTIVE DATA SUMMARY:       Past Medical History   Diagnosis Date    Chronic pain         low back pain    Diabetes ([facility] [address] 2001       type II, was on oral agents for a while    Hypercholesteremia      Hypertension      Ill-defined condition         kidny stones    Thyroid disease       Past Surgical History   Procedure Laterality Date    Hx gyn   1977       tubal ligation     Barriers to Learning/Limitations: None  Compensate with: N/A  Prior Level of Function/Home Situation: Lives in one story home w/younger children; oldest daughter lives close by. No AD for mobility; 3 steps entry. Home Situation  Home Environment: Private residence  # Steps to Enter: 3  Rails to Enter: Yes  One/Two Story Residence: One story  Living Alone: No  Support Systems: Child(cuco)  Patient Expects to be Discharged to[de-identified] Private residence  Critical Behavior:  Neurologic State: Alert  Orientation Level: Oriented X4  Cognition: Appropriate for age attention/concentration  Safety/Judgement: Awareness of environment  Psychosocial  Patient Behaviors: Lethargic  Purposeful Interaction: Yes  Pt Identified Daily Priority: Clinical issues (comment)  Caritas Process: Nurture loving kindness;Establish trust;Teaching/learning; Attend basic human needs;Create healing environment;Open life/death unknowns; Supportive expression  Caring Interventions: Reassure; Therapeutic modalities  Reassure: Therapeutic listening; Informing; Acceptance; Support family;Caring rounds  Therapeutic Modalities: Humor; Intentional therapeutic touch         Strength:  B LE's grossly 3+/5 at hip;  4-/5 knee, ankle. Tone & Sensation:   Tone: Normal   Sensation: Intact          Range Of Motion:  AROM: Within functional limits      Strength:          Functional Mobility: Patient is fairly independent with general functional mobility. Weak, and uses UE's to push off of bed into standing; still unsteady, although no reports of dizziness while up and OOB. Bed Mobility:  Rolling: Modified independent     Transfers:  Sit to Stand: Stand-by asssistance  Stand to Sit: Stand-by asssistance  Stand Pivot Transfers: Contact guard assistance  Stand Pivot Transfers: Contact guard assistance     Balance:   Sitting: Intact  Standing: Impaired; With support  Standing - Static: Fair  Standing - Dynamic : Fair  Ambulation/Gait Training:  Distance (ft): 60 Feet (ft)  Assistive Device: Gait belt  Ambulation - Level of Assistance: Contact guard assistance     Gait Description (WDL): Exceptions to WDL  Gait Abnormalities: Decreased step clearance;Shuffling gait           Stance: Weight shift  Speed/Graciela: Shuffled; Slow  Step Length: Left shortened;Right shortened        Pain:  Pain Scale 1: Numeric (0 - 10)  Pain Intensity 1: 0           Activity Tolerance:  FAIR +      Please refer to the flowsheet for vital signs taken during this treatment. After treatment:   [X]         Patient left in no apparent distress sitting up on bed eating dinner. [ ]         Patient left in no apparent distress in bed  [X]         Call bell left within reach  [X]         Nursing notified  [ ]         Caregiver present  [ ]         Bed alarm activated      COMMUNICATION/EDUCATION:   [X]         Fall prevention education was provided and the patient/caregiver indicated understanding.   [X] Patient  has participated as able in goal setting and plan of care. [ ]         Patient/family agree to work toward stated goals and plan of care. [ ]         Patient understands intent and goals of therapy, but is neutral about his/her participation. [ ]         Patient is unable to participate in goal setting and plan of care.      Thank you for this referral.  Quita Montejo, PT

## 2017-01-08 NOTE — PROGRESS NOTES
Pharmacy Dosing Services: Vancomycin    Consult for Vancomycin Dosing by Pharmacy by Dr. Rachana Rust provided for this 77y.o. year old female , for indication of CAP. Day of Therapy 1    Ht Readings from Last 1 Encounters:   01/07/17 160 cm (63\")        Wt Readings from Last 1 Encounters:   01/07/17 67.1 kg (148 lb)        Previous Regimen N/A   Last Level N/A   Other Current Antibiotics Zosyn 3.375 grams q6h; Ceftriaxone 1 gram x 1 dose in ED   Significant Cultures Blood - pending   Serum Creatinine Lab Results   Component Value Date/Time    Creatinine 0.87 01/07/2017 09:50 AM      Creatinine Clearance Estimated Creatinine Clearance: 58.5 mL/min (based on Cr of 0.87). BUN Lab Results   Component Value Date/Time    BUN 8 01/07/2017 09:50 AM      WBC Lab Results   Component Value Date/Time    WBC 6.4 01/07/2017 09:50 AM      H/H Lab Results   Component Value Date/Time    HGB 13.3 01/07/2017 09:50 AM      Platelets Lab Results   Component Value Date/Time    PLATELET 086 53/76/4538 09:50 AM      Temp (!) 102 °F (38.9 °C)       Start Vancomycin therapy with loading dose of 1000 mg  IVPB at 2000 - 1/7/17. Follow with maintenance dose of 750 mg IVPB every 12 hrs, next dose scheduled for 0800 - 1/8/17. Dose calculated to approximate a therapeutic trough of 15 - 20 mcg/mL. Pharmacy to follow daily and will make changes to dose and/or frequency based on clinical status.     Pharmacist [phone]

## 2017-01-08 NOTE — PROGRESS NOTES
conducted an initial consultation and Spiritual Assessment for Courtney Villegas, who is a 77 y.o.,female. Patients Primary Language is: [place]. According to the patients EMR Gnosticism Affiliation is: No Gnosticism. The reason the Patient came to the hospital is:   Patient Active Problem List    Diagnosis Date Noted    Pneumonia 01/07/2017    Orthostasis 01/07/2017    Dehydration 97/32/8748    DM w/o complication type II ([facility] [address] 01/07/2017    Respiratory distress, acute ([facility] [address] 01/07/2017    Hypoxia 01/07/2017    HTN (hypertension) 01/07/2017    Ureteral stone 06/08/2014        The  provided the following Interventions:  Initiated a relationship of care and support. Explored issues of cathleen, belief, spirituality and Jain/ritual needs while hospitalized. Listened empathically. Provided chaplaincy education. Provided information about Spiritual Care Services. Offered assurance of continued prayers on patients behalf. Chart reviewed. The following outcomes were achieved:  Patient shared limited information about both their medical narrative and spiritual journey/beliefs. Patient processed feeling about current hospitalization. Patient expressed gratitude for pastoral care visit. Assessment:  Patient does not have any Jain/cultural needs that will affect patients preferences in health care. There are no further spiritual or Jain issues which require intervention at this time. Plan:  Chaplains will continue to follow and will provide pastoral care on an as needed/requested basis.  recommends bedside caregivers page  on duty if patient shows signs of acute spiritual or emotional distress.       Sister [place], [place], [address]  [phone]

## 2017-01-09 LAB
ANION GAP BLD CALC-SCNC: 7 MMOL/L (ref 3–18)
BASOPHILS # BLD AUTO: 0 K/UL (ref 0–0.1)
BASOPHILS # BLD: 0 % (ref 0–3)
BUN SERPL-MCNC: 14 MG/DL (ref 7–18)
BUN/CREAT SERPL: 14 (ref 12–20)
CALCIUM SERPL-MCNC: 8.5 MG/DL (ref 8.5–10.1)
CHLORIDE SERPL-SCNC: 107 MMOL/L (ref 100–108)
CO2 SERPL-SCNC: 29 MMOL/L (ref 21–32)
CREAT SERPL-MCNC: 0.99 MG/DL (ref 0.6–1.3)
DATE LAST DOSE: NORMAL
DIFFERENTIAL METHOD BLD: ABNORMAL
EOSINOPHIL # BLD: 0 K/UL (ref 0–0.4)
EOSINOPHIL NFR BLD: 0 % (ref 0–5)
ERYTHROCYTE [DISTWIDTH] IN BLOOD BY AUTOMATED COUNT: 12.9 % (ref 11.6–14.5)
GLUCOSE BLD STRIP.AUTO-MCNC: 114 MG/DL (ref 70–110)
GLUCOSE BLD STRIP.AUTO-MCNC: 117 MG/DL (ref 70–110)
GLUCOSE BLD STRIP.AUTO-MCNC: 168 MG/DL (ref 70–110)
GLUCOSE BLD STRIP.AUTO-MCNC: 177 MG/DL (ref 70–110)
GLUCOSE SERPL-MCNC: 223 MG/DL (ref 74–99)
HCT VFR BLD AUTO: 33.7 % (ref 35–45)
HGB BLD-MCNC: 11.5 G/DL (ref 12–16)
LYMPHOCYTES # BLD AUTO: 9 % (ref 20–51)
LYMPHOCYTES # BLD: 1.1 K/UL (ref 0.8–3.5)
MAGNESIUM SERPL-MCNC: 2.6 MG/DL (ref 1.8–2.4)
MCH RBC QN AUTO: 29.7 PG (ref 24–34)
MCHC RBC AUTO-ENTMCNC: 34.1 G/DL (ref 31–37)
MCV RBC AUTO: 87.1 FL (ref 74–97)
MONOCYTES # BLD: 0.5 K/UL (ref 0–1)
MONOCYTES NFR BLD AUTO: 4 % (ref 2–9)
NEUTS BAND NFR BLD MANUAL: 8 % (ref 0–5)
NEUTS SEG # BLD: 9.6 K/UL (ref 1.8–8)
NEUTS SEG NFR BLD AUTO: 79 % (ref 42–75)
PLATELET # BLD AUTO: 194 K/UL (ref 135–420)
PMV BLD AUTO: 10 FL (ref 9.2–11.8)
POTASSIUM SERPL-SCNC: 4.1 MMOL/L (ref 3.5–5.5)
RBC # BLD AUTO: 3.87 M/UL (ref 4.2–5.3)
RBC MORPH BLD: ABNORMAL
REPORTED DOSE,DOSE: NORMAL UNITS
REPORTED DOSE/TIME,TMG: 2119
SODIUM SERPL-SCNC: 143 MMOL/L (ref 136–145)
VANCOMYCIN TROUGH SERPL-MCNC: 11 UG/ML (ref 10–20)
WBC # BLD AUTO: 12.2 K/UL (ref 4.6–13.2)
WBC MORPH BLD: ABNORMAL

## 2017-01-09 PROCEDURE — 74011000258 HC RX REV CODE- 258: Performed by: HOSPITALIST

## 2017-01-09 PROCEDURE — 80048 BASIC METABOLIC PNL TOTAL CA: CPT | Performed by: HOSPITALIST

## 2017-01-09 PROCEDURE — 80202 ASSAY OF VANCOMYCIN: CPT | Performed by: HOSPITALIST

## 2017-01-09 PROCEDURE — 97165 OT EVAL LOW COMPLEX 30 MIN: CPT

## 2017-01-09 PROCEDURE — 36415 COLL VENOUS BLD VENIPUNCTURE: CPT | Performed by: HOSPITALIST

## 2017-01-09 PROCEDURE — 83735 ASSAY OF MAGNESIUM: CPT | Performed by: HOSPITALIST

## 2017-01-09 PROCEDURE — 74011250637 HC RX REV CODE- 250/637: Performed by: HOSPITALIST

## 2017-01-09 PROCEDURE — 74011250636 HC RX REV CODE- 250/636: Performed by: HOSPITALIST

## 2017-01-09 PROCEDURE — 74011636637 HC RX REV CODE- 636/637: Performed by: HOSPITALIST

## 2017-01-09 PROCEDURE — 77010033678 HC OXYGEN DAILY

## 2017-01-09 PROCEDURE — 65660000000 HC RM CCU STEPDOWN

## 2017-01-09 PROCEDURE — 82962 GLUCOSE BLOOD TEST: CPT

## 2017-01-09 PROCEDURE — 85025 COMPLETE CBC W/AUTO DIFF WBC: CPT | Performed by: HOSPITALIST

## 2017-01-09 PROCEDURE — 97116 GAIT TRAINING THERAPY: CPT

## 2017-01-09 PROCEDURE — 94640 AIRWAY INHALATION TREATMENT: CPT

## 2017-01-09 PROCEDURE — 94760 N-INVAS EAR/PLS OXIMETRY 1: CPT

## 2017-01-09 PROCEDURE — 74011000250 HC RX REV CODE- 250: Performed by: HOSPITALIST

## 2017-01-09 RX ADMIN — PIPERACILLIN AND TAZOBACTAM 3.38 G: 3; .375 INJECTION, POWDER, FOR SOLUTION INTRAVENOUS at 17:36

## 2017-01-09 RX ADMIN — IPRATROPIUM BROMIDE AND ALBUTEROL SULFATE 3 ML: .5; 3 SOLUTION RESPIRATORY (INHALATION) at 15:27

## 2017-01-09 RX ADMIN — Medication 10 ML: at 05:34

## 2017-01-09 RX ADMIN — INSULIN LISPRO 3 UNITS: 100 INJECTION, SOLUTION INTRAVENOUS; SUBCUTANEOUS at 06:41

## 2017-01-09 RX ADMIN — BUDESONIDE 500 MCG: 0.5 SUSPENSION RESPIRATORY (INHALATION) at 20:00

## 2017-01-09 RX ADMIN — PIPERACILLIN AND TAZOBACTAM 3.38 G: 3; .375 INJECTION, POWDER, FOR SOLUTION INTRAVENOUS at 05:34

## 2017-01-09 RX ADMIN — INSULIN LISPRO 6 UNITS: 100 INJECTION, SOLUTION INTRAVENOUS; SUBCUTANEOUS at 14:11

## 2017-01-09 RX ADMIN — SODIUM CHLORIDE 750 MG: 900 INJECTION, SOLUTION INTRAVENOUS at 09:15

## 2017-01-09 RX ADMIN — SODIUM CHLORIDE 1000 MG: 900 INJECTION, SOLUTION INTRAVENOUS at 23:41

## 2017-01-09 RX ADMIN — GUAIFENESIN 600 MG: 600 TABLET, EXTENDED RELEASE ORAL at 08:18

## 2017-01-09 RX ADMIN — PIPERACILLIN AND TAZOBACTAM 3.38 G: 3; .375 INJECTION, POWDER, FOR SOLUTION INTRAVENOUS at 14:09

## 2017-01-09 RX ADMIN — IPRATROPIUM BROMIDE AND ALBUTEROL SULFATE 3 ML: .5; 3 SOLUTION RESPIRATORY (INHALATION) at 00:03

## 2017-01-09 RX ADMIN — GUAIFENESIN 600 MG: 600 TABLET, EXTENDED RELEASE ORAL at 20:40

## 2017-01-09 RX ADMIN — PIPERACILLIN AND TAZOBACTAM 3.38 G: 3; .375 INJECTION, POWDER, FOR SOLUTION INTRAVENOUS at 23:41

## 2017-01-09 RX ADMIN — ACETAMINOPHEN 650 MG: 325 TABLET, FILM COATED ORAL at 08:31

## 2017-01-09 RX ADMIN — IPRATROPIUM BROMIDE AND ALBUTEROL SULFATE 3 ML: .5; 3 SOLUTION RESPIRATORY (INHALATION) at 07:20

## 2017-01-09 RX ADMIN — IPRATROPIUM BROMIDE AND ALBUTEROL SULFATE 3 ML: .5; 3 SOLUTION RESPIRATORY (INHALATION) at 19:58

## 2017-01-09 RX ADMIN — INSULIN DETEMIR 20 UNITS: 100 INJECTION, SOLUTION SUBCUTANEOUS at 21:29

## 2017-01-09 RX ADMIN — INSULIN LISPRO 6 UNITS: 100 INJECTION, SOLUTION INTRAVENOUS; SUBCUTANEOUS at 17:35

## 2017-01-09 RX ADMIN — INSULIN LISPRO 3 UNITS: 100 INJECTION, SOLUTION INTRAVENOUS; SUBCUTANEOUS at 14:11

## 2017-01-09 RX ADMIN — BUDESONIDE 500 MCG: 0.5 SUSPENSION RESPIRATORY (INHALATION) at 07:21

## 2017-01-09 RX ADMIN — INSULIN LISPRO 6 UNITS: 100 INJECTION, SOLUTION INTRAVENOUS; SUBCUTANEOUS at 08:18

## 2017-01-09 RX ADMIN — IPRATROPIUM BROMIDE AND ALBUTEROL SULFATE 3 ML: .5; 3 SOLUTION RESPIRATORY (INHALATION) at 11:38

## 2017-01-09 RX ADMIN — ENOXAPARIN SODIUM 40 MG: 40 INJECTION SUBCUTANEOUS at 23:41

## 2017-01-09 RX ADMIN — Medication 10 ML: at 22:00

## 2017-01-09 RX ADMIN — Medication 5 ML: at 14:00

## 2017-01-09 NOTE — ROUTINE PROCESS
Bedside and Verbal shift change report given to Sharyle Jock RN (oncoming nurse) by Adam Soares. Rosa Ward RN (offgoing nurse).  Report included the following information SBAR, Kardex, Intake/Output, MAR, Recent Results and Cardiac Rhythm SR.

## 2017-01-09 NOTE — ROUTINE PROCESS
Alarm parameters reviewed, on and audible Appropriate for patient clinical condition     Shift summary:  Pt remained stable. No acute changes. Pt's appetite improving. No fever today. Dtr at bedside this evening. Alarm parameters reviewed and opportunities for questions provided.

## 2017-01-09 NOTE — PROGRESS NOTES
0800 Bedside report received. Assumed care of pt. Pt alert and oriented. 0830 Pt sitting @ bedside eating, c/o back pain, assisted to chair with steady support and tylenol 650 mg adm as per MAR. Call light placed within reach. 0930 pain resolved, pt resting comfortably    1000 Interdisciplinary team rounds were held 1/9/2017 with the following team members:Physician  Dr Chapo Calderon, MARU and the patient. Wean pt off oxygen    Plan of care discussed. See clinical pathway and/or care plan for interventions and desired outcomes. 1700 Pt spent time ambulating and sitting in chair- ambulates to bathroom as needed. 1900 Pt spent a fair day. No acute events. Pt was weaned off oxygen and sats now 99 % on RA.

## 2017-01-09 NOTE — PROGRESS NOTES
Problem: Self Care Deficits Care Plan (Adult)  Goal: *Acute Goals and Plan of Care (Insert Text)  Occupational Therapy Goals  Initiated 1/9/2017 within 7 day(s). 1. Patient will perform lower body dressing with modified independence   2. Patient will perform toilet transfers with modified independence. 3. Patient will complete standing for 5 minutes during ADL routine with supervision without rest to increase activity tolerance for functional activity. 4. Patient will participate in upper extremity therapeutic exercise/activities with modified independence for 5 minutes. 5. Patient will utilize energy conservation techniques during functional activities with verbal cues. OCCUPATIONAL THERAPY EVALUATION     Patient: Alta Mohr (18 y.o. female)  Date: 1/9/2017  Primary Diagnosis: Pneumonia  Orthostasis        Precautions:  Fall      ASSESSMENT :  Based on the objective data described below, the patient presents with pneumonia. Pt completed transfers and functional mobility with SBA this session. Pt completed LB dressing and toileting with SBA. Pt currently demonstrating generalized weakness and SOB with activity. Pt on 2L of oxygen nasal canula and normally does not wear any oxygen at home. Pt could benefit from OT to increase activity tolerance and strength for functional activities. Patient will benefit from skilled intervention to address the above impairments.   Patients rehabilitation potential is considered to be Good  Factors which may influence rehabilitation potential include:   [ ]             None noted  [ ]             Mental ability/status  [ ]             Medical condition  [ ]             Home/family situation and support systems  [ ]             Safety awareness  [ ]             Pain tolerance/management  [ ]             Other:        PLAN :  Recommendations and Planned Interventions:  [X]               Self Care Training                  [X]        Therapeutic Activities  [X] Functional Mobility Training    [ ]        Cognitive Retraining  [X]               Therapeutic Exercises           [X]        Endurance Activities  [X]               Balance Training                   [ ]        Neuromuscular Re-Education  [ ]               Visual/Perceptual Training     [X]   Home Safety Training  [X]               Patient Education                 [X]        Family Training/Education  [ ]               Other (comment):     Frequency/Duration: Patient will be followed by occupational therapy 1-2 times per day/4-7 days per week to address goals. Discharge Recommendations: Home Health  Further Equipment Recommendations for Discharge: N/A       SUBJECTIVE:   Patient stated I'm weak.       OBJECTIVE DATA SUMMARY:       Past Medical History   Diagnosis Date    Chronic pain         low back pain    Diabetes ([facility] [address] 2001       type II, was on oral agents for a while    Hypercholesteremia      Hypertension      Ill-defined condition         kidny stones    Thyroid disease         Past Surgical History   Procedure Laterality Date    Hx gyn   1977       tubal ligation      Barriers to Learning/Limitations: None  Compensate with: visual, verbal, tactile, kinesthetic cues/model  Prior Level of Function/Home Situation: I with ADLs prior to admission  Home Situation  Home Environment: Private residence  # Steps to Enter: 3  Rails to Enter: Yes  One/Two Story Residence: One story  Living Alone: No  Support Systems: Child(cuco)  Patient Expects to be Discharged to[de-identified] Private residence  Current DME Used/Available at Home: None  Tub or Shower Type: Tub/Shower combination  [ ]  Right hand dominant           [ ]  Left hand dominant  Cognitive/Behavioral Status:  Neurologic State: Alert; Appropriate for age  Orientation Level: Oriented X4  Cognition: Appropriate decision making     Skin: intact  Edema: none noted  Vision/Perceptual:  N/A  Coordination:  Coordination: Within functional limits  Fine Motor Skills-Upper: Left Intact; Right Intact    Gross Motor Skills-Upper: Left Intact; Right Intact  Balance:  Sitting: Intact  Standing: Intact; With support  Standing - Static: Good  Standing - Dynamic : Fair  Strength:  B UE: WFL  Tone & Sensation:  Tone: Normal  Sensation: Intact  Range of Motion:  AROM: Within functional limits  Functional Mobility and Transfers for ADLs:  Bed Mobility: N/A  Transfers:  Sit to Stand: Stand-by asssistance              Toilet Transfer : Stand-by asssistance              ADL Assessment:  Upper Body Dressing: Supervision  Lower Body Dressing: Stand-by assistance  Toileting: Stand by assistance     Pain:  Pain Scale 1: Numeric (0 - 10)  Pain Intensity 1: 7  Pain Location 1: Back  Pain Description 1: Aching  Pain Intervention(s) 1: Medication (see MAR); Position  Activity Tolerance:   Fair -  Please refer to the flowsheet for vital signs taken during this treatment. After treatment:   [X] Patient left in no apparent distress sitting up in chair  [ ] Patient left in no apparent distress in bed  [X] Call bell left within reach  [ ] Nursing notified  [ ] Caregiver present  [ ] Bed alarm activated      COMMUNICATION/EDUCATION:   [X] Home safety education was provided and the patient/caregiver indicated understanding. [X] Patient/family have participated as able in goal setting and plan of care. [X] Patient/family agree to work toward stated goals and plan of care. [ ] Patient understands intent and goals of therapy, but is neutral about his/her participation. [ ] Patient is unable to participate in goal setting and plan of care.      Thank you for this referral.  Josefa Burrell OTR/L  Time Calculation: 21 mins

## 2017-01-09 NOTE — PROGRESS NOTES
Problem: Mobility Impaired (Adult and Pediatric)  Goal: *Acute Goals and Plan of Care (Insert Text)  Physical Therapy Goals  Initiated 1/8/2017 and to be accomplished within 3 day(s)      1. Patient will transfer from bed to chair and with supervision/set-up using no or LRAD for increased OOB activity. 2. Patient will perform sit to stand with supervision/set-up using no/LRAD for increased standing tolerance/prep for gait. 3. Patient will ambulate with supervision/set-up for 200 feet with no/LRAD for return to safe home mobility. 4. Patient will ascend/descend 3 stairs with either handrail w/ supervision/set-up for safe return to home mobility. Outcome: Progressing Towards Goal  PHYSICAL THERAPY TREATMENT     Patient: Celi Ash (1 y.o. female)  Date: 1/9/2017  Diagnosis: Pneumonia  Orthostasis Pneumonia  Precautions: Fall   Chart, physical therapy assessment, plan of care and goals were reviewed. ASSESSMENT:  Pt showing steady improvement with mobility, and ambulating without '. No LOB. Slow jero noted. Pt on 1L O2. No standing rest needed. Pt willing to remain sitting in chair post tx. Cont POC. Progression toward goals:  [ ]      Improving appropriately and progressing toward goals  [X]      Improving slowly and progressing toward goals  [ ]      Not making progress toward goals and plan of care will be adjusted       PLAN:  Patient continues to benefit from skilled intervention to address the above impairments. Continue treatment per established plan of care.   Discharge Recommendations:  None  Further Equipment Recommendations for Discharge:  N/A       SUBJECTIVE:   Patient stated  I'll sit up      OBJECTIVE DATA SUMMARY:   Critical Behavior:  Neurologic State: Alert, Appropriate for age  Orientation Level: Oriented X4  Cognition: Appropriate decision making  Safety/Judgement: Awareness of environment  Functional Mobility Training:  Bed Mobility:  Rolling: Supervision  Supine to Sit: Supervision  Scooting: Supervision  Transfers:  Sit to Stand: Stand-by asssistance  Stand to Sit: Stand-by asssistance  Balance:  Sitting: Intact  Standing: Intact  Standing - Static: Good  Standing - Dynamic : Fair  Ambulation/Gait Training:  Distance (ft): 450 Feet (ft)  Ambulation - Level of Assistance: Contact guard assistance/ SBA  Gait Abnormalities: Decreased step clearance  Speed/Graciela: Slow;Shuffled  Step Length: Right shortened;Left shortened     Pain:  Pain Scale 1: Numeric (0 - 10)  Pain Intensity 1: 0  Pain Location 1: Back  Pain Description 1: Aching  Pain Intervention(s) 1: Medication (see MAR); Position  Activity Tolerance:   Good     After treatment:   [X] Patient left in no apparent distress sitting up in chair  [ ] Patient left in no apparent distress in bed  [X] Call bell left within reach  [ ] Nursing notified  [ ] Caregiver present  [ ] Bed alarm activated      Ximena Alford PTA   Time Calculation: 24 mins

## 2017-01-09 NOTE — PROGRESS NOTES
Hospitalist Progress Note-critical care note     Patient: Lola Key MRN: [id number]  CSN: [id number]    YOB: 1950  Age: 77 y.o. Sex: female    DOA: 1/7/2017 LOS:  LOS: 2 days            Chief complaint: hypoxia, pna dm , meniere disease ,asthma exacerbation     Assessment/Plan         Patient Active Problem List   Diagnosis Code    Ureteral stone N20.1    Pneumonia J18.9    Orthostasis I95.1    Dehydration O46.5    DM w/o complication type II ([address] E11.9    Respiratory distress, acute ([address] R06.00    Hypoxia R09.02    HTN (hypertension) I10    Meniere disease H81.09    Asthma exacerbation J45.901     1 acute respiratory distress and hypoxia   Continue improving and wean off O2. Due to pna , will continue nc o2 and treat pna,  2. PNA  continue  vanc and zosyn, breathing tx,  Short term iv steroid. Continue incentive spirometry   3. DM type II   -long term insulin   -on levimer , ssi, diabetic diet , hypoglycemia protocol  add premeal insulin   4. HTN, accelerated  Well controlled   Continue home medication. 5.orthostasis and dehydration  Resolved   6 hx of meniere disease   Continue meclizine prn, fall precaution    7 asthma exacerbation (poa)  Continue steroid add pulmcort   8 hypomagnesemia   Resolved     Will have pt/ot  Subjective: sob improving   Nurse: no acute issue   Review of systems:    General: No fevers or chills. Cardiovascular: No chest pain or pressure. No palpitations. Pulmonary: No shortness of breath. Gastrointestinal: No nausea, vomiting. Vital signs/Intake and Output:  Visit Vitals    /56 (BP 1 Location: Right arm, BP Patient Position: At rest)    Pulse 64    Temp 98.1 °F (36.7 °C)    Resp 18    Ht 5' 3\" (1.6 m)    Wt 67.1 kg (148 lb)    SpO2 94%    BMI 26.22 kg/m2     Current Shift:  01/09 0701 - 01/09 1900  In: 240 [P.O.:240]  Out: -   Last three shifts:  01/07 1901 - 01/09 0700  In: 670 [P.O.:120;  I.V.:550]  Out: 951 [Urine:950]    Physical Exam:  General: WD, WN. Alert, cooperative, no acute distress    HEENT: NC, Atraumatic. PERRLA, anicteric sclerae. Lungs: No wheezing, rales -improving   Heart:  Regular  rhythm,  No murmur, No Rubs, No Gallops  Abdomen: Soft, Non distended, Non tender.  +Bowel sounds,   Extremities: No c/c/e  Psych:   Not anxious or agitated. Neurologic:  No acute neurological deficit. Labs: Results:       Chemistry Recent Labs      01/09/17 0247 01/08/17 0637  01/07/17   0950   GLU  223*  292*  144*   NA  143  142  135*   K  4.1  3.6  4.5   CL  107  104  98*   CO2  29  26  30   BUN  14  10  8   CREA  0.99  1.14  0.87   CA  8.5  8.4*  8.6   AGAP  7  12  7   BUCR  14  9*  9*   AP   --    --   55   TP   --    --   7.9   ALB   --    --   3.4   GLOB   --    --   4.5*   AGRAT   --    --   0.8      CBC w/Diff Recent Labs      01/09/17 0247 01/08/17 0637  01/07/17   0950   WBC  12.2  9.7  6.4   RBC  3.87*  4.30  4.40   HGB  11.5*  12.7  13.3   HCT  33.7*  38.0  38.4   PLT  194  184  191   GRANS  79*  90*  74*   LYMPH  9*  6*  14*   EOS  0  0  0      Cardiac Enzymes Recent Labs      01/07/17   0950   CPK  170   CKND1  CANNOT BE CALCULATED      Coagulation No results for input(s): PTP, INR, APTT in the last 72 hours. No lab exists for component: INREXT, INREXT    Lipid Panel No results found for: CHOL, CHOLPOCT, CHOLX, CHLST, CHOLV, [id number], HDL, LDL, NLDLCT, DLDL, LDLC, DLDLP, 845281, VLDLC, VLDL, TGL, TGLX, TRIGL, [id number], TRIGP, TGLPOCT, [id number], CHHD, CHHDX   BNP No results for input(s): BNPP in the last 72 hours.    Liver Enzymes Recent Labs      01/07/17   0950   TP  7.9   ALB  3.4   AP  55   SGOT  36      Thyroid Studies No results found for: T4, T3U, TSH, TSHEXT, TSHEXT     Procedures/imaging: see electronic medical records for all procedures/Xrays and details which were not copied into this note but were reviewed prior to creation of Donelda MD Jan

## 2017-01-09 NOTE — ROUTINE PROCESS
Bedside and Verbal shift change report given to Taz Gauthier RN (oncoming nurse) by Martín Omalley RN (offgoing nurse). Report included the following information SBAR and Kardex.

## 2017-01-10 LAB
ANION GAP BLD CALC-SCNC: 8 MMOL/L (ref 3–18)
BASOPHILS # BLD AUTO: 0 K/UL (ref 0–0.1)
BASOPHILS # BLD: 0 % (ref 0–3)
BUN SERPL-MCNC: 12 MG/DL (ref 7–18)
BUN/CREAT SERPL: 12 (ref 12–20)
CALCIUM SERPL-MCNC: 7.9 MG/DL (ref 8.5–10.1)
CHLORIDE SERPL-SCNC: 109 MMOL/L (ref 100–108)
CO2 SERPL-SCNC: 29 MMOL/L (ref 21–32)
CREAT SERPL-MCNC: 0.97 MG/DL (ref 0.6–1.3)
DIFFERENTIAL METHOD BLD: ABNORMAL
EOSINOPHIL # BLD: 0.1 K/UL (ref 0–0.4)
EOSINOPHIL NFR BLD: 1 % (ref 0–5)
ERYTHROCYTE [DISTWIDTH] IN BLOOD BY AUTOMATED COUNT: 13.1 % (ref 11.6–14.5)
GLUCOSE BLD STRIP.AUTO-MCNC: 144 MG/DL (ref 70–110)
GLUCOSE BLD STRIP.AUTO-MCNC: 176 MG/DL (ref 70–110)
GLUCOSE BLD STRIP.AUTO-MCNC: 256 MG/DL (ref 70–110)
GLUCOSE BLD STRIP.AUTO-MCNC: 48 MG/DL (ref 70–110)
GLUCOSE BLD STRIP.AUTO-MCNC: 91 MG/DL (ref 70–110)
GLUCOSE SERPL-MCNC: 45 MG/DL (ref 74–99)
HCT VFR BLD AUTO: 33.5 % (ref 35–45)
HGB BLD-MCNC: 11.2 G/DL (ref 12–16)
LYMPHOCYTES # BLD AUTO: 26 % (ref 20–51)
LYMPHOCYTES # BLD: 2.9 K/UL (ref 0.8–3.5)
MAGNESIUM SERPL-MCNC: 1.9 MG/DL (ref 1.8–2.4)
MCH RBC QN AUTO: 29.5 PG (ref 24–34)
MCHC RBC AUTO-ENTMCNC: 33.4 G/DL (ref 31–37)
MCV RBC AUTO: 88.2 FL (ref 74–97)
MONOCYTES # BLD: 0.5 K/UL (ref 0–1)
MONOCYTES NFR BLD AUTO: 4 % (ref 2–9)
NEUTS BAND NFR BLD MANUAL: 4 % (ref 0–5)
NEUTS SEG # BLD: 7.3 K/UL (ref 1.8–8)
NEUTS SEG NFR BLD AUTO: 65 % (ref 42–75)
PLATELET # BLD AUTO: 250 K/UL (ref 135–420)
PMV BLD AUTO: 9.9 FL (ref 9.2–11.8)
POTASSIUM SERPL-SCNC: 3.4 MMOL/L (ref 3.5–5.5)
RBC # BLD AUTO: 3.8 M/UL (ref 4.2–5.3)
RBC MORPH BLD: ABNORMAL
SODIUM SERPL-SCNC: 146 MMOL/L (ref 136–145)
WBC # BLD AUTO: 11.3 K/UL (ref 4.6–13.2)
WBC MORPH BLD: ABNORMAL

## 2017-01-10 PROCEDURE — 94760 N-INVAS EAR/PLS OXIMETRY 1: CPT

## 2017-01-10 PROCEDURE — 97530 THERAPEUTIC ACTIVITIES: CPT

## 2017-01-10 PROCEDURE — 94640 AIRWAY INHALATION TREATMENT: CPT

## 2017-01-10 PROCEDURE — 82962 GLUCOSE BLOOD TEST: CPT

## 2017-01-10 PROCEDURE — 65660000000 HC RM CCU STEPDOWN

## 2017-01-10 PROCEDURE — 74011000258 HC RX REV CODE- 258: Performed by: HOSPITALIST

## 2017-01-10 PROCEDURE — 85025 COMPLETE CBC W/AUTO DIFF WBC: CPT | Performed by: HOSPITALIST

## 2017-01-10 PROCEDURE — 74011250637 HC RX REV CODE- 250/637: Performed by: HOSPITALIST

## 2017-01-10 PROCEDURE — 74011250636 HC RX REV CODE- 250/636: Performed by: HOSPITALIST

## 2017-01-10 PROCEDURE — 97116 GAIT TRAINING THERAPY: CPT

## 2017-01-10 PROCEDURE — 36415 COLL VENOUS BLD VENIPUNCTURE: CPT | Performed by: HOSPITALIST

## 2017-01-10 PROCEDURE — 80048 BASIC METABOLIC PNL TOTAL CA: CPT | Performed by: HOSPITALIST

## 2017-01-10 PROCEDURE — 74011636637 HC RX REV CODE- 636/637: Performed by: HOSPITALIST

## 2017-01-10 PROCEDURE — 83735 ASSAY OF MAGNESIUM: CPT | Performed by: HOSPITALIST

## 2017-01-10 PROCEDURE — 77010033678 HC OXYGEN DAILY

## 2017-01-10 PROCEDURE — 74011000250 HC RX REV CODE- 250: Performed by: HOSPITALIST

## 2017-01-10 RX ORDER — INSULIN LISPRO 100 [IU]/ML
5 INJECTION, SOLUTION INTRAVENOUS; SUBCUTANEOUS
Status: DISCONTINUED | OUTPATIENT
Start: 2017-01-10 | End: 2017-01-11 | Stop reason: HOSPADM

## 2017-01-10 RX ORDER — PREDNISONE 20 MG/1
40 TABLET ORAL
Status: DISCONTINUED | OUTPATIENT
Start: 2017-01-10 | End: 2017-01-11 | Stop reason: HOSPADM

## 2017-01-10 RX ORDER — INSULIN LISPRO 100 [IU]/ML
INJECTION, SOLUTION INTRAVENOUS; SUBCUTANEOUS
Status: DISCONTINUED | OUTPATIENT
Start: 2017-01-10 | End: 2017-01-11 | Stop reason: HOSPADM

## 2017-01-10 RX ORDER — LIDOCAINE 50 MG/G
OINTMENT TOPICAL AS NEEDED
Status: DISCONTINUED | OUTPATIENT
Start: 2017-01-10 | End: 2017-01-11 | Stop reason: HOSPADM

## 2017-01-10 RX ORDER — IPRATROPIUM BROMIDE AND ALBUTEROL SULFATE 2.5; .5 MG/3ML; MG/3ML
3 SOLUTION RESPIRATORY (INHALATION)
Status: DISCONTINUED | OUTPATIENT
Start: 2017-01-10 | End: 2017-01-11 | Stop reason: HOSPADM

## 2017-01-10 RX ADMIN — INSULIN LISPRO 6 UNITS: 100 INJECTION, SOLUTION INTRAVENOUS; SUBCUTANEOUS at 12:33

## 2017-01-10 RX ADMIN — INSULIN LISPRO 6 UNITS: 100 INJECTION, SOLUTION INTRAVENOUS; SUBCUTANEOUS at 22:00

## 2017-01-10 RX ADMIN — IPRATROPIUM BROMIDE AND ALBUTEROL SULFATE 3 ML: .5; 3 SOLUTION RESPIRATORY (INHALATION) at 11:01

## 2017-01-10 RX ADMIN — Medication 10 ML: at 06:00

## 2017-01-10 RX ADMIN — INSULIN LISPRO 5 UNITS: 100 INJECTION, SOLUTION INTRAVENOUS; SUBCUTANEOUS at 16:57

## 2017-01-10 RX ADMIN — IPRATROPIUM BROMIDE AND ALBUTEROL SULFATE 3 ML: .5; 3 SOLUTION RESPIRATORY (INHALATION) at 07:03

## 2017-01-10 RX ADMIN — DEXTROSE MONOHYDRATE 25 G: 25 INJECTION, SOLUTION INTRAVENOUS at 06:43

## 2017-01-10 RX ADMIN — PIPERACILLIN AND TAZOBACTAM 3.38 G: 3; .375 INJECTION, POWDER, FOR SOLUTION INTRAVENOUS at 13:13

## 2017-01-10 RX ADMIN — BUDESONIDE 500 MCG: 0.5 SUSPENSION RESPIRATORY (INHALATION) at 07:04

## 2017-01-10 RX ADMIN — INSULIN DETEMIR 15 UNITS: 100 INJECTION, SOLUTION SUBCUTANEOUS at 21:52

## 2017-01-10 RX ADMIN — GUAIFENESIN 600 MG: 600 TABLET, EXTENDED RELEASE ORAL at 21:49

## 2017-01-10 RX ADMIN — Medication 10 ML: at 16:28

## 2017-01-10 RX ADMIN — PREDNISONE 40 MG: 20 TABLET ORAL at 12:33

## 2017-01-10 RX ADMIN — PIPERACILLIN AND TAZOBACTAM 3.38 G: 3; .375 INJECTION, POWDER, FOR SOLUTION INTRAVENOUS at 05:37

## 2017-01-10 RX ADMIN — Medication 10 ML: at 21:52

## 2017-01-10 RX ADMIN — SODIUM CHLORIDE 1000 MG: 900 INJECTION, SOLUTION INTRAVENOUS at 11:29

## 2017-01-10 RX ADMIN — BUDESONIDE 500 MCG: 0.5 SUSPENSION RESPIRATORY (INHALATION) at 20:30

## 2017-01-10 RX ADMIN — INSULIN LISPRO 6 UNITS: 100 INJECTION, SOLUTION INTRAVENOUS; SUBCUTANEOUS at 08:54

## 2017-01-10 RX ADMIN — PIPERACILLIN AND TAZOBACTAM 3.38 G: 3; .375 INJECTION, POWDER, FOR SOLUTION INTRAVENOUS at 17:56

## 2017-01-10 RX ADMIN — GUAIFENESIN 600 MG: 600 TABLET, EXTENDED RELEASE ORAL at 08:55

## 2017-01-10 NOTE — DIABETES MGMT
GLYCEMIC CONTROL AND NUTRITION PROGRESS NOTE:    Assessment/Recommendations:  - BG out of target range, known h/o DM2, poorly controlled, current A1C 7.9%  - steroids have been on board and exacerbating BG in 300's, now d/c'd, oral prednisone to start today  - noted hypoglycemic event this AM on labs & POCT (45 & 48 mg/dl), hypo protocol followed, treated with 25g D50, Md notified, no new orders recieved  - recommend reduce insulin regimen to the following:   * Levemir 15 units qhs   * Humalog 5 units qac   * reduce corrective coverage to Normal Sensitivity   (orders entered per Dr. Saida Pugh)  - Altered nutrition-related lab values r/t DM2 AEB A1c of 7.9%  - overweight r/t h/o excessive energy intake AEB BMI of 27%    Goal:  - BG will be in target range of  mg/dl (non-ICU) or 140-180 md/dl (ICU) by 1/15/17  - PO intake will continue to average at least 75% of meals offered by 1/15/17    Most recent blood glucose values:   Lab Results   Component Value Date/Time    GLU 45 (LL) 01/10/2017 05:08 AM    GLUCPOC 91 01/10/2017 11:08 AM    GLUCPOC 176 (H) 01/10/2017 07:00 AM    GLUCPOC 48 (L) 01/10/2017 06:40 AM           Current A1C is equivalent to average blood glucose of 180 mg/dl over the past 2-3 months.     Lab Results   Component Value Date/Time    Hemoglobin A1c 7.9 01/07/2017 09:50 AM         Current hospital diabetes medications:   - Levemir 20 units qhs  - Humalog 6 units qac  - Humalog Very Insulin Resistant Corrective Coverage    Previous day's insulin requirements:   - 44 units total (20 Levemir, 24 Humalog - 18 mealtime, 6 corrective)    Home diabetes medications:  - Levemir 27 units qhs  - Humalog 12 units qac (listed as not taking in PTA list, unable to verify at this time)     Estimated Nutrition Needs: 1500 Kcals/day (25 kcal/kg of IBW), Protein (g): 64 g (1.2 g/kg) Fluid (ml): 1500 ml (1 ml/kcal)  Based on:   []          Actual BW    [x]          IBW   []            Adjusted BW        Anthropometrics: IBW : 57.6 kg (127 lb), % IBW (Calculated): 121.89 %     Body mass index is 27.42 kg/(m^2). Last 3 Recorded Weights in this Encounter    01/07/17 0948 01/10/17 0356   Weight: 67.1 kg (148 lb) 70.2 kg (154 lb 12.8 oz)    Ht Readings from Last 1 Encounters:   01/10/17 5' 3\" (1.6 m)       Diet:    Active Orders   Diet    DIET DIABETIC CONSISTENT CARB Regular       Intake:   Patient Vitals for the past 100 hrs:   % Diet Eaten   01/10/17 0905 75 %   01/09/17 1412 75 %   01/09/17 0923 100 %   01/08/17 2341 100 %       Education:  ____Refer to Diabetes Education Record             __x__Education not indicated at this time          DEREK Gutierrez, MPH, RD

## 2017-01-10 NOTE — INTERDISCIPLINARY ROUNDS
MEDICAL/SURGICAL INTERDISCIPLINARY ROUNDS      Patient Information:    Name:   Joselito Hernandez    Age:   77 y.o. Admission Date:   1/7/2017    Readmit Risk Assessment Information:       Readmit Risk Tool Support Systems: Child(cuco), Relationship with Primary Physician Group: Seen at least one time within past 12 months    Day of Stay: 3    Expected Discharge Date:   1/12/2017    Attending Provider:   Caterina Rivas MD    Primary Care Provider:   Jonathan Calzada MD    Problem List:     Patient Active Problem List   Diagnosis Code    Ureteral stone N20.1    Pneumonia J18.9    Orthostasis I95.1    Dehydration A41.1    DM w/o complication type II ([address] E11.9    Respiratory distress, acute ([address] R06.00    Hypoxia R09.02    HTN (hypertension) I10    Meniere disease H81.09    Asthma exacerbation J45.901       Principal Problem:  Pneumonia    During rounds the following quality care indicators and evidence based practices were addressed :   DVT Prophylaxis, Pain Management, PN Vaccine and Flu Vaccine        Pneumonia:    Appropriate Antibiotic Selection (ICU versus Non-ICU)    Discharge Management:  Home ; CM offered Hospital to Home program and this was declined. Anticipated Discharge Date:  1/13/2017    Goal for today:  Offer vaccinations and control pain. Interdisciplinary team rounds were held on 1/10/2017 with the following team members Shivani Newman RN, GERSON Conklin, Abbey Noble CM and Dr. Miguelito Champion and the patient and her daughter. Plan of care discussed. See clinical pathway and/or care plan for interventions and desired outcomes.

## 2017-01-10 NOTE — ROUTINE PROCESS
Bedside and Verbal shift change report given to Bertram Reno RN (oncoming nurse) by Za Zarate RN (offgoing nurse).  Report included the following information SBAR, Kardex, Intake/Output, MAR, Recent Results and Cardiac Rhythm SR.

## 2017-01-10 NOTE — PROGRESS NOTES
Problem: Mobility Impaired (Adult and Pediatric)  Goal: *Acute Goals and Plan of Care (Insert Text)  Physical Therapy Goals  Initiated 1/8/2017 and to be accomplished within 3 day(s)      1. Patient will transfer from bed to chair and with supervision/set-up using no or LRAD for increased OOB activity. 2. Patient will perform sit to stand with supervision/set-up using no/LRAD for increased standing tolerance/prep for gait. 3. Patient will ambulate with supervision/set-up for 200 feet with no/LRAD for return to safe home mobility. 4. Patient will ascend/descend 3 stairs with either handrail w/ supervision/set-up for safe return to home mobility. Outcome: Resolved/Met Date Met:  01/10/17  PHYSICAL THERAPY TREATMENT/DISCHARGE     Patient: Ermelinda Song (74 y.o. female)  Date: 1/10/2017  Diagnosis: Pneumonia  Orthostasis Pneumonia  Precautions: Fall  Chart, physical therapy assessment, plan of care and goals were reviewed. ASSESSMENT:  Pt meets needs for safe home mobility, expresses understanding of HEP and is cleared from this level of PT. Progression toward goals:  [X]      Goals met  [ ]      Improving appropriately and progressing toward goals  [ ]      Improving slowly and progressing toward goals  [ ]      Not making progress toward goals and plan of care will be adjusted       PLAN:  Patient will be discharged from physical therapy at this time.   Rationale for discharge:  [X] Goals Achieved  [ ] Salima Delong  [ ] Patient not participating in therapy  [ ] Other:  Discharge Recommendations:  None   Further Equipment Recommendations for Discharge:  N/A       SUBJECTIVE:   Patient stated  I'm better today        OBJECTIVE DATA SUMMARY:   Critical Behavior:  Neurologic State: Alert  Orientation Level: Oriented X4  Cognition: Appropriate decision making  Safety/Judgement: Awareness of environment  Functional Mobility Training:  Bed Mobility:  Rolling: Modified independent  Supine to Sit: Modified independent  Sit to Supine: Modified independent  Scooting: Modified independent  Transfers:  Sit to Stand: Supervision  Stand to Sit: Supervision  Balance:  Sitting: Intact  Standing: Intact  Standing - Static: Good  Standing - Dynamic : Good  Ambulation/Gait Training:  Distance (ft): 450 Feet (ft)  Assistive Device: Gait belt  Ambulation - Level of Assistance: Supervision  Gait Abnormalities: Decreased step clearance  Speed/Graciela: Slow  Step Length: Right shortened;Left shortened     Stairs:  Number of Stairs Trained: 3  Stairs - Level of Assistance: Supervision              Rail Use: Right         Pain:  Pain Scale 1: Numeric (0 - 10)  Pain Intensity 1: 0  Activity Tolerance:   Good      After treatment:   [ ] Patient left in no apparent distress sitting up in chair  [X] Patient left in no apparent distress in bed  [X] Call bell left within reach  [X] Nursing notified  [X] Caregiver present  [ ] Bed alarm activated  Komal Farrar PTA   Time Calculation: 26 mins

## 2017-01-10 NOTE — PROGRESS NOTES
Pharmacy Dosing Services: Vancomycin    Consult for Vancomycin Dosing by Pharmacy by Dr. Marco A Taylor provided for this 77y.o. year old female , for indication of pneumonia (CAP). Day of Therapy 3    Ht Readings from Last 1 Encounters:   01/07/17 160 cm (63\")        Wt Readings from Last 1 Encounters:   01/07/17 67.1 kg (148 lb)      Previous Regimen Vancomycin 750 mg IV every 12 hours   Last Level Trough = 11.0 @ 2135 on 1/9/17   Other Current Antibiotics Zosyn   Serum Creatinine Lab Results   Component Value Date/Time    Creatinine 0.99 01/09/2017 02:47 AM      Creatinine Clearance Estimated Creatinine Clearance: 51.4 mL/min (based on Cr of 0.99). BUN Lab Results   Component Value Date/Time    BUN 14 01/09/2017 02:47 AM      WBC Lab Results   Component Value Date/Time    WBC 12.2 01/09/2017 02:47 AM      H/H Lab Results   Component Value Date/Time    HGB 11.5 01/09/2017 02:47 AM      Platelets Lab Results   Component Value Date/Time    PLATELET 291 68/40/3135 02:47 AM      Temp 98.7 °F (37.1 °C)     Follow with maintenance dose of Vancomycin 1000 mg IV every 12 hours    Dose calculated to approximate a therapeutic trough of 15-20 mcg/mL. Pharmacy to follow daily and will make changes to dose and/or frequency based on clinical status.     Pharmacist Jazzmine Melgar, [address]

## 2017-01-10 NOTE — PROGRESS NOTES
1942:  Assumed care. Pt awake, alert and oriented. Pt resting in bed with no acute signs of distress. Call bell and telephone within reach. White board updated. 2052:  Pt c/o \"anxious feeling in chest\"  Pt assessed, no chest pain or tightening nausea or vomiting. Pt just received breathing tx. Pt refused any medication for anxiety. Will continue to monitor    0113:  Pt states she is feeling better. No c/o of pain, distress or anxiety. 3236:  Critical Result:  Hypoglycemic episode. BG 48. Pt c/o being hot. 7450:  D50 25g administered. 7756Ardella [place] with hospitalist re critical result. Dr Manuel Flanagan. No new orders. 0700:  . Shift Summary:  Hypoglycemic episode this AM.  Pt in bed resting with no signs of distress or c/o pain.

## 2017-01-10 NOTE — PROGRESS NOTES
1942:  Assumed care. Pt awake, alert and oriented. Pt resting in bed with no acute signs of distress. Call bell and telephone within reach. White board updated. 2052:  Pt c/o \"anxious feeling in chest\"  Pt assessed, no chest pain nausea or vomiting. Pt just received breathing tx. Pt refused any medication for anxiety.   Will continue to monitor

## 2017-01-10 NOTE — ROUTINE PROCESS
Bedside and Verbal shift change report given to DAKOTAH Tyson RN (oncoming nurse) by Naun Willard RN  (offgoing nurse). Report included the following information SBAR, Kardex, Recent Results, Med Rec Status, Cardiac Rhythm SR and Alarm Parameters .

## 2017-01-10 NOTE — PROGRESS NOTES
0800 Bedside report received. Assumed care of pt. Pt alert and oriented. Eating breakfast.     0900 Pt in chair, watching TV. Pt c/o of soreness spot to Rt lower back. Same assessed, no open area but area is tender to touch. Declines tylenol. Will discuss with M.IGLESIA Cedillo Interdisciplinary team rounds were held 1/10/2017 with the following team members: Dr Eduardo Galvan, RN , , Stephanie Sheldon and Al Olivas and daughter. Plan of care discussed. Pt voiced c/o flank pain. Dr Inocencio Garcia will put order for topical treatment and will taper steroids-  See clinical pathway and/or care plan for interventions and desired outcomes. Flu vaccine offered to pt, wants to get upon discharge. 1600 Checked on pt, says back pain has subsided for now. 1700 Up in chair eating dinner. 1800 pt in bed resting. Spent a fair and uneventful  Day. Has had mild c/o rt flank pain but was relieved without med. Pt was told that there is lidocaine cream available when needed No acute distress

## 2017-01-10 NOTE — PROGRESS NOTES
Hospitalist Progress Note-critical care note     Patient: Brijesh Pfeiffer MRN: [id number]  CSN: [id number]    YOB: 1950  Age: 77 y.o. Sex: female    DOA: 1/7/2017 LOS:  LOS: 3 days            Chief complaint: hypoxia, pna dm , meniere disease ,asthma exacerbation     Assessment/Plan         Patient Active Problem List   Diagnosis Code    Ureteral stone N20.1    Pneumonia J18.9    Orthostasis I95.1    Dehydration D61.9    DM w/o complication type II ([address] E11.9    Respiratory distress, acute ([address] R06.00    Hypoxia R09.02    HTN (hypertension) I10    Meniere disease H81.09    Asthma exacerbation J45.901     1 acute respiratory distress and hypoxia   Resolved, off NC O2  2. PNA  Will d/c vanc and continue zosyn, breathing change to prn , wean off steroid . Continue incentive spirometry   3. DM type II   -long term insulin   -low glucose AM. Will decrease  levimer , ssi, diabetic diet , hypoglycemia protocol  decrease premeal insulin   4. HTN, accelerated  Well controlled   Continue home medication. 5.orthostasis and dehydration  Resolved   6 hx of meniere disease   Continue meclizine prn, fall precaution    7 asthma exacerbation (poa)  Wheezing better . Continue steroid po   8 hypomagnesemia   Resolved   9 rt back pain. Topic lidocaine,     Doing well  With pt/ot  Subjective: sob better, rt side pain , chest congestion   Nurse: no acute issue   Review of systems:    General: No fevers or chills. Cardiovascular: No chest pain or pressure. No palpitations. Pulmonary: No shortness of breath. Gastrointestinal: No nausea, vomiting.      Vital signs/Intake and Output:  Visit Vitals    /56 (BP 1 Location: Left arm, BP Patient Position: At rest)    Pulse 66    Temp 97.4 °F (36.3 °C)    Resp 18    Ht 5' 3\" (1.6 m)    Wt 70.2 kg (154 lb 12.8 oz)    SpO2 98%    Breastfeeding No    BMI 27.42 kg/m2     Current Shift:  01/10 0701 - 01/10 1900  In: 480 [P.O.:480]  Out: 400 [Urine:400]  Last three shifts:  01/08 1901 - 01/10 0700  In: 7034 [P.O.:1260; I.V.:450]  Out: 1000 [Urine:1000]    Physical Exam:  General: WD, WN. Alert, cooperative, no acute distress    HEENT: NC, Atraumatic. PERRLA, anicteric sclerae. Lungs: No wheezing, rales -improving   Heart:  Regular  rhythm,  No murmur, No Rubs, No Gallops  Abdomen: Soft, Non distended, Non tender.  +Bowel sounds,   Extremities: No c/c/e  Psych:   Not anxious or agitated. Neurologic:  No acute neurological deficit. Labs: Results:       Chemistry Recent Labs      01/10/17   0508  01/09/17   0247  01/08/17   0637   GLU  45*  223*  292*   NA  146*  143  142   K  3.4*  4.1  3.6   CL  109*  107  104   CO2  29  29  26   BUN  12  14  10   CREA  0.97  0.99  1.14   CA  7.9*  8.5  8.4*   AGAP  8  7  12   BUCR  12  14  9*      CBC w/Diff Recent Labs      01/10/17   0508  01/09/17   0247  01/08/17   0637   WBC  11.3  12.2  9.7   RBC  3.80*  3.87*  4.30   HGB  11.2*  11.5*  12.7   HCT  33.5*  33.7*  38.0   PLT  250  194  184   GRANS  65  79*  90*   LYMPH  26  9*  6*   EOS  1  0  0      Cardiac Enzymes No results for input(s): CPK, CKND1, GARRETT in the last 72 hours. No lab exists for component: CKRMB, TROIP   Coagulation No results for input(s): PTP, INR, APTT in the last 72 hours. No lab exists for component: INREXT, INREXT    Lipid Panel No results found for: CHOL, CHOLPOCT, CHOLX, CHLST, CHOLV, [id number], HDL, LDL, NLDLCT, DLDL, LDLC, DLDLP, [id number], VLDLC, VLDL, TGL, TGLX, TRIGL, [id number], TRIGP, TGLPOCT, [id number], CHHD, CHHDX   BNP No results for input(s): BNPP in the last 72 hours. Liver Enzymes No results for input(s): TP, ALB, TBIL, AP, SGOT, GPT in the last 72 hours.     No lab exists for component: DBIL   Thyroid Studies No results found for: T4, T3U, TSH, TSHEXT, TSHEXT     Procedures/imaging: see electronic medical records for all procedures/Xrays and details which were not copied into this note but were reviewed prior to creation of Michelle Jara MD

## 2017-01-11 ENCOUNTER — HOME HEALTH ADMISSION (OUTPATIENT)
Dept: HOME HEALTH SERVICES | Facility: HOME HEALTH | Age: 67
End: 2017-01-11

## 2017-01-11 VITALS
HEIGHT: 63 IN | TEMPERATURE: 98.3 F | WEIGHT: 154.8 LBS | RESPIRATION RATE: 18 BRPM | OXYGEN SATURATION: 95 % | BODY MASS INDEX: 27.43 KG/M2 | DIASTOLIC BLOOD PRESSURE: 68 MMHG | HEART RATE: 67 BPM | SYSTOLIC BLOOD PRESSURE: 154 MMHG

## 2017-01-11 LAB
GLUCOSE BLD STRIP.AUTO-MCNC: 127 MG/DL (ref 70–110)
GLUCOSE BLD STRIP.AUTO-MCNC: 151 MG/DL (ref 70–110)
MAGNESIUM SERPL-MCNC: 2 MG/DL (ref 1.8–2.4)

## 2017-01-11 PROCEDURE — 83735 ASSAY OF MAGNESIUM: CPT | Performed by: HOSPITALIST

## 2017-01-11 PROCEDURE — 74011000250 HC RX REV CODE- 250: Performed by: HOSPITALIST

## 2017-01-11 PROCEDURE — 82962 GLUCOSE BLOOD TEST: CPT

## 2017-01-11 PROCEDURE — 74011250637 HC RX REV CODE- 250/637: Performed by: HOSPITALIST

## 2017-01-11 PROCEDURE — 90471 IMMUNIZATION ADMIN: CPT

## 2017-01-11 PROCEDURE — 74011250636 HC RX REV CODE- 250/636: Performed by: HOSPITALIST

## 2017-01-11 PROCEDURE — 74011000258 HC RX REV CODE- 258: Performed by: HOSPITALIST

## 2017-01-11 PROCEDURE — 94640 AIRWAY INHALATION TREATMENT: CPT

## 2017-01-11 PROCEDURE — 90686 IIV4 VACC NO PRSV 0.5 ML IM: CPT | Performed by: HOSPITALIST

## 2017-01-11 PROCEDURE — 74011636637 HC RX REV CODE- 636/637: Performed by: HOSPITALIST

## 2017-01-11 PROCEDURE — 36415 COLL VENOUS BLD VENIPUNCTURE: CPT | Performed by: HOSPITALIST

## 2017-01-11 RX ORDER — INSULIN LISPRO 100 [IU]/ML
5 INJECTION, SOLUTION INTRAVENOUS; SUBCUTANEOUS
Qty: 1 VIAL | Refills: 0 | Status: SHIPPED
Start: 2017-01-11 | End: 2019-01-01

## 2017-01-11 RX ORDER — ALBUTEROL SULFATE 90 UG/1
1 AEROSOL, METERED RESPIRATORY (INHALATION)
Qty: 1 INHALER | Refills: 0 | Status: SHIPPED | OUTPATIENT
Start: 2017-01-11 | End: 2019-01-01

## 2017-01-11 RX ORDER — LEVOFLOXACIN 500 MG/1
500 TABLET, FILM COATED ORAL DAILY
Qty: 3 TAB | Refills: 0 | Status: SHIPPED | OUTPATIENT
Start: 2017-01-11 | End: 2019-01-01

## 2017-01-11 RX ORDER — PREDNISONE 10 MG/1
TABLET ORAL
Qty: 21 TAB | Refills: 0 | Status: SHIPPED | OUTPATIENT
Start: 2017-01-11 | End: 2019-01-01

## 2017-01-11 RX ORDER — FLUTICASONE PROPIONATE AND SALMETEROL 100; 50 UG/1; UG/1
1 POWDER RESPIRATORY (INHALATION) EVERY 12 HOURS
Qty: 1 INHALER | Refills: 0 | Status: SHIPPED | OUTPATIENT
Start: 2017-01-11 | End: 2019-01-01

## 2017-01-11 RX ADMIN — INSULIN LISPRO 5 UNITS: 100 INJECTION, SOLUTION INTRAVENOUS; SUBCUTANEOUS at 12:57

## 2017-01-11 RX ADMIN — INFLUENZA VIRUS VACCINE 0.5 ML: 15; 15; 15; 15 SUSPENSION INTRAMUSCULAR at 12:57

## 2017-01-11 RX ADMIN — PIPERACILLIN AND TAZOBACTAM 3.38 G: 3; .375 INJECTION, POWDER, FOR SOLUTION INTRAVENOUS at 06:06

## 2017-01-11 RX ADMIN — PIPERACILLIN AND TAZOBACTAM 3.38 G: 3; .375 INJECTION, POWDER, FOR SOLUTION INTRAVENOUS at 00:01

## 2017-01-11 RX ADMIN — GUAIFENESIN 600 MG: 600 TABLET, EXTENDED RELEASE ORAL at 08:57

## 2017-01-11 RX ADMIN — INSULIN LISPRO 2 UNITS: 100 INJECTION, SOLUTION INTRAVENOUS; SUBCUTANEOUS at 07:30

## 2017-01-11 RX ADMIN — PREDNISONE 40 MG: 20 TABLET ORAL at 08:57

## 2017-01-11 RX ADMIN — BUDESONIDE 500 MCG: 0.5 SUSPENSION RESPIRATORY (INHALATION) at 07:38

## 2017-01-11 RX ADMIN — ENOXAPARIN SODIUM 40 MG: 40 INJECTION SUBCUTANEOUS at 00:01

## 2017-01-11 RX ADMIN — Medication 10 ML: at 06:08

## 2017-01-11 NOTE — ROUTINE PROCESS
Bedside and Verbal shift change report given to Juan Wharton RN (oncoming nurse) by Kenneth Webber RN (offgoing nurse).  Report included the following information SBAR, Kardex, Intake/Output, MAR, Recent Results and Cardiac Rhythm SR.

## 2017-01-11 NOTE — PROGRESS NOTES
0720: Received bedside report from [facility]. Pt lying in bed awake and alert. Oriented X 4. Pt denies pain. On room air, lungs clear to diminished. Vital signs stable. Pt states she hoping to be discharged today. Bed in lowest position, call light within reach. 1100: Spoke to Dr. Racquel Hernandez regarding pt status, states pt will be discharged today.

## 2017-01-11 NOTE — ROUTINE PROCESS
1942:  Assumed care. Pt awake, alert and oriented. Pt resting in bed with no acute signs of distress. Call bell and telephone within reach. White board updated. Daughter at bedside    Shift Summary:  Pt had uneventful shift. Pt in bed resting with no signs of distress or c/o pain.

## 2017-01-11 NOTE — DISCHARGE SUMMARY
Discharge Summary    Patient: Ja Watters MRN: [id number]  CSN: [id number]    YOB: 1950  Age: 77 y.o. Sex: female    DOA: 1/7/2017 LOS:  LOS: 4 days   Discharge Date:      Primary Care Provider:  Jonathan Calzada MD    Admission Diagnoses: Pneumonia  Orthostasis    Discharge Diagnoses:    Patient Active Problem List   Diagnosis Code    Ureteral stone N20.1    Pneumonia J18.9    Orthostasis I95.1    Dehydration R23.7    DM w/o complication type II ([facility] E11.9    Respiratory distress, acute ([facility] R06.00    Hypoxia R09.02    HTN (hypertension) I10    Meniere disease H81.09    Asthma exacerbation J45.901       Discharge Condition: Stable    Discharge Medications:     Current Discharge Medication List      START taking these medications    Details   guaiFENesin SR (MUCINEX) 600 mg SR tablet Take 1 Tab by mouth every twelve (12) hours. Qty: 20 Tab, Refills: 0      predniSONE (DELTASONE) 10 mg tablet Take 4 tabs x 2 days, 3 tabs x 2 days , 2 tabs x 2 days, 1 tab x 2 days, 0.5 tab x 2 days  Qty: 21 Tab, Refills: 0      levoFLOXacin (LEVAQUIN) 500 mg tablet Take 1 Tab by mouth daily. Qty: 3 Tab, Refills: 0      albuterol (PROVENTIL HFA, VENTOLIN HFA, PROAIR HFA) 90 mcg/actuation inhaler Take 1 Puff by inhalation every four (4) hours as needed for Wheezing. Qty: 1 Inhaler, Refills: 0      fluticasone-salmeterol (ADVAIR DISKUS) 100-50 mcg/dose diskus inhaler Take 1 Puff by inhalation every twelve (12) hours. Qty: 1 Inhaler, Refills: 0         CONTINUE these medications which have CHANGED    Details   insulin detemir (LEVEMIR) 100 unit/mL injection 0.15 mL by SubCUTAneous route nightly. Qty: 1 Vial, Refills: 0      insulin lispro (HUMALOG) 100 unit/mL injection 5 Units by SubCUTAneous route Before breakfast, lunch, and dinner.   Qty: 1 Vial, Refills: 0         CONTINUE these medications which have NOT CHANGED    Details   levothyroxine (SYNTHROID) 100 mcg tablet Take 100 mcg by mouth Daily (before breakfast). Instructed to take morning of surgery with sip of water. meclizine (ANTIVERT) 25 mg tablet Take 1 tablet by mouth every 6 hours for the next 3 days. Then stop taking the meclizine. Restart the meclizine for 3 day intervals if vertigo/ dizziness returns. Qty: 20 Tab, Refills: 0      simvastatin (ZOCOR) 40 mg tablet Take 40 mg by mouth nightly. enalapril (VASOTEC) 20 mg tablet Take 20 mg by mouth daily. Instructed to take morning of surgery with sip of water             Procedures : none     Consults: None      PHYSICAL EXAM   Visit Vitals    /68 (BP 1 Location: Right arm, BP Patient Position: At rest;Supine)    Pulse 67    Temp 98.3 °F (36.8 °C)    Resp 18    Ht 5' 3\" (1.6 m)    Wt 70.2 kg (154 lb 12.8 oz)    SpO2 95%    Breastfeeding No    BMI 27.42 kg/m2     General: Awake, cooperative, no acute distress    HEENT: NC, Atraumatic. PERRLA, EOMI. Anicteric sclerae. Lungs:  CTA Bilaterally. No Wheezing/Rhonchi/Rales. Heart:  Regular  rhythm,  No murmur, No Rubs, No Gallops  Abdomen: Soft, Non distended, Non tender. +Bowel sounds,   Extremities: No c/c/e  Psych:   Not anxious or agitated. Neurologic:  No acute neurological deficits. Admission HPI :   Abi Davis is a 77 y.o. female who hx of DM type II/ HTN with recent travel to [place] was sent to Ed due to dizziness and sob. She reported that she had n/v -\"stomach bugs\" last Monday, then feel dizziness for 3 days, further presented chills/fever /cough with greenish sputum for two days, associated sob on exertion. Reported tired. She was found orthostasis in ed-1 L ns given. Cta: negative for PE, but indicated in middle lobes, lingula and right middle lobe either atelectasis and/or pneumonia. She presented desat on exertion, abg PO2 48 on 4 L nc O2.  Given one dose rocephin in ED and breathing tx.      Denies any slurred speech/headache/cp/n/v/blurred vission/d/c/palpitation/gait change/bleeding. Denies smoking/ any alcohol or drug use.        Hospital Course :   She presented  Acute respiratory distress with hypoxia on admission,   CTA negative for PE, but indicated pneumonia. Broad abx with nc O2 and breathing tx/iv steroid  were administrated for pna and asthma exacerbation. With the treatment, she was off Nc O2 on discharge. She presented dizziness with orthostasis due to dehydration, her dizziness resolved after hydration. Mg was replaced for hypomagnesemia. We also controlled her HTN and DM . Insulin dosage was adjusted due to the hypoglycemia during her stay. Activity: Activity as tolerated    Diet: Diabetic Diet    Follow-up: [facility]     Disposition: home     Minutes spent on discharge: 45 min       Labs: Results:       Chemistry Recent Labs      01/10/17   0508  01/09/17   0247   GLU  45*  223*   NA  146*  143   K  3.4*  4.1   CL  109*  107   CO2  29  29   BUN  12  14   CREA  0.97  0.99   CA  7.9*  8.5   AGAP  8  7   BUCR  12  14      CBC w/Diff Recent Labs      01/10/17   0508  01/09/17   0247   WBC  11.3  12.2   RBC  3.80*  3.87*   HGB  11.2*  11.5*   HCT  33.5*  33.7*   PLT  250  194   GRANS  65  79*   LYMPH  26  9*   EOS  1  0      Cardiac Enzymes No results for input(s): CPK, CKND1, GARRETT in the last 72 hours. No lab exists for component: CKRMB, TROIP   Coagulation No results for input(s): PTP, INR, APTT in the last 72 hours. No lab exists for component: INREXT    Lipid Panel No results found for: CHOL, CHOLPOCT, CHOLX, CHLST, CHOLV, [id number], HDL, LDL, NLDLCT, DLDL, LDLC, DLDLP, 132917, VLDLC, VLDL, TGL, TGLX, TRIGL, [id number], TRIGP, TGLPOCT, [id number], CHHD, CHHDX   BNP No results for input(s): BNPP in the last 72 hours. Liver Enzymes No results for input(s): TP, ALB, TBIL, AP, SGOT, GPT in the last 72 hours.     No lab exists for component: DBIL   Thyroid Studies No results found for: T4, T3U, TSH, TSHEXT         Significant Diagnostic Studies: Cta Chest W Wo Cont    Result Date: 1/7/2017  EXAM: CTA Chest INDICATION: Shortness of breath. COMPARISON: None. TECHNIQUE: Axial CT imaging from the thoracic inlet through the diaphragm with intravenous contrast. Coronal and sagittal MIP reformats were generated. One or more dose reduction techniques were used on this CT: automated exposure control, adjustment of the mAs and/or kVp according to patient's size, and iterative reconstruction techniques. The specific techniques utilized on this CT exam have been documented in the patient's electronic medical record. _______________ FINDINGS: EXAM QUALITY: Overall exam quality is excellent. Pulmonary arterial enhancement is optimal. The breath hold is adequate. There are no significant artifacts impacting image quality. PULMONARY ARTERIES: No evidence of pulmonary embolism. MEDIASTINUM: Mild cardiomegaly. No pericardial effusion. Diffusely nodular thyroid. LYMPH NODES: No enlarged nodes. AIRWAY: Normal. LUNGS: Bandlike opacities in lower lobes, lingula and right middle lobe compatible with atelectasis and/or pneumonia. PLEURA: Normal. Specifically, no pneumothorax or pleural effusion. UPPER ABDOMEN: Patulous esophagus. OTHER: No acute or aggressive osseous abnormalities identified. _______________     IMPRESSION: 1. No evidence of pulmonary embolism. 2.  Prominent bandlike opacities in middle lobes, lingula and right middle lobe either atelectasis and/or pneumonia. Mild cardiomegaly. 3. Diffusely nodular thyroid. Ct Abd Pelv Wo Cont    Result Date: 1/7/2017  Exam:  CT Abdomen and Pelvis Indication:  Abdominal pain Comparison:  None Technique:  A CT scan of the abdomen and pelvis was performed. Helical axial images were performed from the dome of the diaphragm to the pubic symphysis without the administration of either oral or IV contrast material.   Multiplanar reconstructions were performed. Dose reduction techniques used:  Automated exposure control, adjustment of the mAs and/or kVp according to patient's size, and/or iterative reconstruction techniques. Utilized techniques are listed in the medical record. Lung bases: Bilateral discoid atelectasis at both lung bases. A small hiatal hernia appears evident. Liver: Normal in size and attenuation showing no masses to suggest malignancy, either primary or metastatic. Gallbladder and bile ducts: The gallbladder shows faint hazy density along the dependent wall correlating with the site of echogenic structure on sonography. Differential again includes polyp or adherent sludge ball. The gallbladder appeared otherwise unremarkable. No bile duct dilatation is present. Spleen: No suspicious abnormality. Pancreas: Normal in appearance showing no evidence of malignancy or inflammation. Adrenal glands: Normal bilaterally. Kidneys: Normal in appearance bilaterally, showing no evidence of malignancy or obstruction. Vascular: Normal in caliber. Atherosclerosis is present Lymph nodes: Within normal limits. GI Tract:  Normal.  The appendix is normal Pelvic Organs: There is no evidence of adenopathy or ascites. The bowel and mesenteric structures are unremarkable. There are no soft tissue masses. There are several densely calcified uterine fibroids present. Bones: Spondylosis, no aggressive osseous lesion. Other: There is ill-defined density noted involving the subcutaneous fat along the anterior abdominal wall which may be related to medication injections. IMPRESSION: Bilateral patchy areas of discoid atelectasis Small faint hazy density along the dependent wall the gallbladder correlating with the site of echogenic structure on sonography. Differential again includes adherent sludge ball and polyp.  Densely calcified uterine fibroids Possible small hiatal hernia     Xr Chest Port    Result Date: 1/7/2017  Indication:  Dizziness, abdominal pain Comparison:  07/31/16 Time of study - 0955 Findings:  AP erect portable chest The cardiomediastinal silhouette is normal.  There is a patchy focal infiltrate at the left lung base. No pneumothorax is evident. IMPRESSION: Focal infiltrate evident at the left lung base. Us Gallbladder    Result Date: 1/7/2017  Indication:  Abdominal pain Comparison:  CT abdomen and pelvis from 03/30/14 Findings: Liver: The liver is normal in size and echogenicity. There are no focal liver lesions noted. Gallbladder and bile ducts: The gallbladder demonstrated a nonmobile echogenic area along the dependent wall of the body. This echogenic area measured 2.0 x 1.1 x 0.9 cm. This structure did not shadow. The differential would include an adherent sludge ball or gallbladder polyp/mass. The common bile duct measures 3 mm. The gallbladder wall was otherwise normal in thickness. There is no evidence of pericholecystic fluid. Pancreas: The pancreas is unremarkable as visualized. Kidneys: The right kidney is normal in size and echogenicity showing no hydronephrosis. The right kidney measures 11.3 cm. Renal cortical thickness is normal. Peritoneum:   There is no ascites. IMPRESSION: Echogenic structure noted within the gallbladder lumen which did not move and it appeared attached to the dependent wall of the gallbladder. Differential includes an adherent sludge ball and a gallbladder polyp/mass.  Outpatient follow-up would be helpful with possible CT or MR No acute abnormality demonstrated             [facility]     CC: Jonathan Calzada MD

## 2017-01-11 NOTE — PROGRESS NOTES
INITIAL NUTRITION ASSESSMENT     RECOMMENDATIONS/PLAN:   1. Monitor/encourag po intakes >=75% to meet estimated needs  2. Continue to follow per policy, should pt nutritional status change soon please consult    REASON FOR ASSESSMENT:     [] LOS  NUTRITION ASSESSMENT:   Client History: 77 yrs old Female admitted with acute respiratory distress and hypoxia due to pneumonia. Eating well, no wt loss noted.   PMHx: chronic pain, DM2, Hypercholesteremia, HTN, kidney stones, thyroid disease  Cultural/Anabaptism Food Preferences: None Identified    FOOD/NUTRITION HISTORY  Diet History: Pt meeting estimated needs throughout admission with PO intakes >=75%  Food Allergies:  [x] NKFA        Pertinent PTA Medications: synthroid    NUTRITION INTAKE   Diet Order:  Diabetic  Average PO Intake:  PO intakes >%   Patient Vitals for the past 100 hrs:   % Diet Eaten   01/11/17 0845 75 %   01/10/17 1758 100 %   01/10/17 1314 75 %   01/10/17 0905 75 %   01/09/17 1412 75 %   01/09/17 0923 100 %   01/08/17 2341 100 %        Pertinent Medications:  [x] Reviewed lispro; levemir, magnesium sulfate, prednisone    Insulin:  [] SSI   [x] Pre-meal:   [x]  Basal:   [] None    Pt expected to meet estimated nutrient needs through next review:          [x]  Yes      ANTHROPOMETRICS  Height: 5' 3\" (160 cm)       Weight: 70.2 kg (154 lb 12.8 oz)    BMI: 27.5 kg/m^2  -  overweight (25.0%-29.9% BMI)        Weight change: no wt loss noted per chart review                                 Comparison to Reference Standards:  IBW: 56.7 kg      %IBW: 134%      AdjBW: 56.7kg    NUTRITION-FOCUSED PHYSICAL ASSESSMENT  Skin: no pressure areas per documentation  GI:  +BM 1/11     BIOCHEMICAL DATA & MEDICAL TESTS  Pertinent Labs:  [x] Reviewed    NUTRITION PRESCRIPTION  Calories:  1591 kcal/day based on miffilin 1.3  Protein: 68-80g/day based on 1.2-1.4 g/kg  CHO: 199g/day based on 50% of total energy  Fluid: 1591 ml/day based on 1 kcal/ml      NUTRITION DIAGNOSES:   No nutrition diagnosis at this time    NUTRITION INTERVENTIONS:   INTERVENTIONS:        GOALS:  1. Continue to encourage PO intakes >=75% to meet estimated needs 1. PO intakes >=75% throughout the next 7 days     LEARNING NEEDS (Diet, Supplementation, Food/Nutrient-Drug Interaction):   [x] None Identified  [] Education provided/documented (refer to Education section of EMR)  [] Identified and patient:  [] Declined     [] Was not appropriate/indicated  NUTRITION MONITORING /EVALUATION:   Follow PO intake  Monitor wt    NUTRITION RISK:   Not at risk    [] Participated in Interdisciplinary Rounds  [x] [address] [place] [address] Reviewed/Documented  [x] Discharge Nutrition Plan: diabetic diet     Will follow-up per policy.   Shauna Bates, [address] [place] [address]  PAGER:  [phone]

## 2017-01-11 NOTE — PROGRESS NOTES
Patient discharged will be returning back home with family,[facility] offered for pneumonia diagnosis pt accepted,,[facility] L[facility] notified,no further needs discussed. Care Management Interventions  PCP Verified by CM: Yes  Palliative Care Consult (Criteria: CHF and RRAT>21): No  Reason for No Palliative Care Consult: Other (see comment)  Mode of Transport at Discharge: Self  Transition of Care Consult (CM Consult): [address]: Yes  Discharge Durable Medical Equipment: No  Health Maintenance Reviewed: Yes  Physical Therapy Consult: Yes  Occupational Therapy Consult: Yes  Speech Therapy Consult: No  Current Support Network:  Other  Confirm Follow Up Transport: Self  Plan discussed with Pt/Family/Caregiver: Yes  Discharge Location  Discharge Placement: Home with home health

## 2017-01-11 NOTE — ROUTINE PROCESS
Bedside and Verbal shift change report given to DAKOTAH Galeano RN (oncoming nurse) by Jesusita Bueno RN  (offgoing nurse). Report included the following information SBAR, Kardex, Intake/Output, Recent Results, Cardiac Rhythm SR and Alarm Parameters .

## 2017-01-11 NOTE — DISCHARGE INSTRUCTIONS
Learning About High Blood Pressure  What is high blood pressure? Blood pressure is a measure of how hard the blood pushes against the walls of your arteries. It's normal for blood pressure to go up and down throughout the day, but if it stays up, you have high blood pressure. Another name for high blood pressure is hypertension. Two numbers tell you your blood pressure. The first number is the systolic pressure. It shows how hard the blood pushes when your heart is pumping. The second number is the diastolic pressure. It shows how hard the blood pushes between heartbeats, when your heart is relaxed and filling with blood. A blood pressure of less than 120/80 (say \"120 over 80\") is ideal for an adult. High blood pressure is 140/90 or higher. You have high blood pressure if your top number is 140 or higher or your bottom number is 90 or higher, or both. Many people fall into the category in between, called prehypertension. People with prehypertension need to make lifestyle changes to bring their blood pressure down and help prevent or delay high blood pressure. What happens when you have high blood pressure? · Blood flows through your arteries with too much force. Over time, this damages the walls of your arteries. But you can't feel it. High blood pressure usually doesn't cause symptoms. · Fat and calcium start to build up in your arteries. This buildup is called plaque. Plaque makes your arteries narrower and stiffer. Blood can't flow through them as easily. · This lack of good blood flow starts to damage some of the organs in your body. This can lead to problems such as coronary artery disease and heart attack, heart failure, stroke, kidney failure, and eye damage. How can you prevent high blood pressure? · Stay at a healthy weight. · Try to limit how much sodium you eat to less than 2,300 milligrams (mg) a day.  If you limit your sodium to 1,500 mg a day, you can lower your blood pressure even more.  ¨ Buy foods that are labeled \"unsalted,\" \"sodium-free,\" or \"low-sodium. \" Foods labeled \"reduced-sodium\" and \"light sodium\" may still have too much sodium. ¨ Flavor your food with garlic, lemon juice, onion, vinegar, herbs, and spices instead of salt. Do not use soy sauce, steak sauce, onion salt, garlic salt, mustard, or ketchup on your food. ¨ Use less salt (or none) when recipes call for it. You can often use half the salt a recipe calls for without losing flavor. · Be physically active. Get at least 30 minutes of exercise on most days of the week. Walking is a good choice. You also may want to do other activities, such as running, swimming, cycling, or playing tennis or team sports. · Limit alcohol to 2 drinks a day for men and 1 drink a day for women. · Eat plenty of fruits, vegetables, and low-fat dairy products. Eat less saturated and total fats. How is high blood pressure treated? · Your doctor will suggest making lifestyle changes. For example, your doctor may ask you to eat healthy foods, quit smoking, lose extra weight, and be more active. · If lifestyle changes don't help enough or your blood pressure is very high, you will have to take medicine every day. Follow-up care is a key part of your treatment and safety. Be sure to make and go to all appointments, and call your doctor if you are having problems. It's also a good idea to know your test results and keep a list of the medicines you take. Where can you learn more? Go to http://hakeem-mehreen.info/. Enter P501 in the search box to learn more about \"Learning About High Blood Pressure. \"  Current as of: March 23, 2016  Content Version: 11.1  © 0798-3568 [employer]. Care instructions adapted under license by [employer] (which disclaims liability or warranty for this information).  If you have questions about a medical condition or this instruction, always ask your healthcare professional. Healthwise, [facility] disclaims any warranty or liability for your use of this information. Pneumonia: Care Instructions  Your Care Instructions    Pneumonia is an infection of the lungs. Most cases are caused by infections from bacteria or viruses. Pneumonia may be mild or very severe. If it is caused by bacteria, you will be treated with antibiotics. It may take a few weeks to a few months to recover fully from pneumonia, depending on how sick you were and whether your overall health is good. Follow-up care is a key part of your treatment and safety. Be sure to make and go to all appointments, and call your doctor if you are having problems. Its also a good idea to know your test results and keep a list of the medicines you take. How can you care for yourself at home? · Take your antibiotics exactly as directed. Do not stop taking the medicine just because you are feeling better. You need to take the full course of antibiotics. · Take your medicines exactly as prescribed. Call your doctor if you think you are having a problem with your medicine. · Get plenty of rest and sleep. You may feel weak and tired for a while, but your energy level will improve with time. · To prevent dehydration, drink plenty of fluids, enough so that your urine is light yellow or clear like water. Choose water and other caffeine-free clear liquids until you feel better. If you have kidney, heart, or liver disease and have to limit fluids, talk with your doctor before you increase the amount of fluids you drink. · Take care of your cough so you can rest. A cough that brings up mucus from your lungs is common with pneumonia. It is one way your body gets rid of the infection. But if coughing keeps you from resting or causes severe fatigue and chest-wall pain, talk to your doctor. He or she may suggest that you take a medicine to reduce the cough. · Use a vaporizer or humidifier to add moisture to your bedroom.  Follow the directions for cleaning the machine. · Do not smoke or allow others to smoke around you. Smoke will make your cough last longer. If you need help quitting, talk to your doctor about stop-smoking programs and medicines. These can increase your chances of quitting for good. · Take an over-the-counter pain medicine, such as acetaminophen (Tylenol), ibuprofen (Advil, Motrin), or naproxen (Aleve). Read and follow all instructions on the label. · Do not take two or more pain medicines at the same time unless the doctor told you to. Many pain medicines have acetaminophen, which is Tylenol. Too much acetaminophen (Tylenol) can be harmful. · If you were given a spirometer to measure how well your lungs are working, use it as instructed. This can help your doctor tell how your recovery is going. · To prevent pneumonia in the future, talk to your doctor about getting a flu vaccine (once a year) and a pneumococcal vaccine (one time only for most people). When should you call for help? Call 911 anytime you think you may need emergency care. For example, call if:  · You have severe trouble breathing. Call your doctor now or seek immediate medical care if:  · You cough up dark brown or bloody mucus (sputum). · You have new or worse trouble breathing. · You are dizzy or lightheaded, or you feel like you may faint. Watch closely for changes in your health, and be sure to contact your doctor if:  · You have a new or higher fever. · You are coughing more deeply or more often. · You are not getting better after 2 days (48 hours). · You do not get better as expected. Where can you learn more? Go to http://hakeem-mehreen.info/. Enter [id number] in the search box to learn more about \"Pneumonia: Care Instructions. \"  Current as of: May 23, 2016  Content Version: 11.1  © 5997-4441 [employer].  Care instructions adapted under license by [employer] (which disclaims liability or warranty for this information). If you have questions about a medical condition or this instruction, always ask your healthcare professional. [address] any warranty or liability for your use of this information. CoupFlip Activation    Thank you for requesting access to [employer]. Please follow the instructions below to securely access and download your online medical record. [employer]oupFlip allows you to send messages to your doctor, view your test results, renew your prescriptions, schedule appointments, and more. How Do I Sign Up? 1. In your internet browser, go to [email or web address]  2. Click on the First Time User? Click Here link in the Sign In box. You will be redirect to the New Member Sign Up page. 3. Enter your CoupFlip Access Code exactly as it appears below. You will not need to use this code after youve completed the sign-up process. If you do not sign up before the expiration date, you must request a new code. CoupFlip Access Code: [email or web address]  Expires: 2017 10:07 AM (This is the date your CoupFlip access code will )    4. Enter the last four digits of your Social Security Number (xxxx) and Date of Birth (mm/dd/yyyy) as indicated and click Submit. You will be taken to the next sign-up page. 5. Create a CoupFlip ID. This will be your CoupFlip login ID and cannot be changed, so think of one that is secure and easy to remember. 6. Create a CoupFlip password. You can change your password at any time. 7. Enter your Password Reset Question and Answer. This can be used at a later time if you forget your password. 8. Enter your e-mail address. You will receive e-mail notification when new information is available in [address] 9. Click Sign Up. You can now view and download portions of your medical record. 10. Click the Download Summary menu link to download a portable copy of your medical information.     Additional Information    If you have questions, please visit the Frequently Asked Questions section of the [employer] website at [email or web address] Remember, [employer] is NOT to be used for urgent needs. For medical emergencies, dial 911.     Patient armband removed and shredded

## 2017-01-12 NOTE — PROGRESS NOTES
Problem: Self Care Deficits Care Plan (Adult)  Goal: *Acute Goals and Plan of Care (Insert Text)  Occupational Therapy Goals  Initiated 1/9/2017 within 7 day(s). 1. Patient will perform lower body dressing with modified independence   2. Patient will perform toilet transfers with modified independence. 3. Patient will complete standing for 5 minutes during ADL routine with supervision without rest to increase activity tolerance for functional activity. 4. Patient will participate in upper extremity therapeutic exercise/activities with modified independence for 5 minutes. 5. Patient will utilize energy conservation techniques during functional activities with verbal cues. Outcome: Not Met  Addendum to chart:      Goals were ongoing at time of discharge. Will resolve this plan of care as patient transitioned to next level of care.       Heavenly Mcmullen MS OTR/L

## 2017-01-13 ENCOUNTER — HOME CARE VISIT (OUTPATIENT)
Dept: HOME HEALTH SERVICES | Facility: HOME HEALTH | Age: 67
End: 2017-01-13

## 2017-01-13 LAB
BACTERIA SPEC CULT: NORMAL
SERVICE CMNT-IMP: NORMAL

## 2018-07-24 ENCOUNTER — HOSPITAL ENCOUNTER (EMERGENCY)
Age: 68
Discharge: HOME | End: 2018-07-24
Attending: EMERGENCY MEDICINE
Payer: MEDICARE

## 2018-07-24 VITALS
DIASTOLIC BLOOD PRESSURE: 58 MMHG | HEIGHT: 62 IN | OXYGEN SATURATION: 100 % | WEIGHT: 120 LBS | HEART RATE: 68 BPM | BODY MASS INDEX: 22.08 KG/M2 | SYSTOLIC BLOOD PRESSURE: 120 MMHG | TEMPERATURE: 98.3 F | RESPIRATION RATE: 16 BRPM

## 2018-07-24 DIAGNOSIS — R73.9 HYPERGLYCEMIA: Primary | ICD-10-CM

## 2018-07-24 LAB
ALBUMIN SERPL-MCNC: 3.6 G/DL (ref 3.4–5)
ALBUMIN/GLOB SERPL: 0.8 {RATIO} (ref 0.8–1.7)
ALP SERPL-CCNC: 114 U/L (ref 45–117)
ALT SERPL-CCNC: 17 U/L (ref 13–56)
ANION GAP SERPL CALC-SCNC: 6 MMOL/L (ref 3–18)
APPEARANCE UR: CLEAR
AST SERPL-CCNC: 16 U/L (ref 15–37)
BASOPHILS # BLD: 0 K/UL (ref 0–0.1)
BASOPHILS NFR BLD: 1 % (ref 0–2)
BILIRUB SERPL-MCNC: 0.3 MG/DL (ref 0.2–1)
BILIRUB UR QL: NEGATIVE
BUN SERPL-MCNC: 11 MG/DL (ref 7–18)
BUN/CREAT SERPL: 11 (ref 12–20)
CALCIUM SERPL-MCNC: 9.7 MG/DL (ref 8.5–10.1)
CHLORIDE SERPL-SCNC: 94 MMOL/L (ref 100–108)
CO2 SERPL-SCNC: 32 MMOL/L (ref 21–32)
COLOR UR: YELLOW
CREAT SERPL-MCNC: 1.01 MG/DL (ref 0.6–1.3)
DIFFERENTIAL METHOD BLD: ABNORMAL
EOSINOPHIL # BLD: 0.2 K/UL (ref 0–0.4)
EOSINOPHIL NFR BLD: 3 % (ref 0–5)
ERYTHROCYTE [DISTWIDTH] IN BLOOD BY AUTOMATED COUNT: 12.4 % (ref 11.6–14.5)
GLOBULIN SER CALC-MCNC: 4.3 G/DL (ref 2–4)
GLUCOSE BLD STRIP.AUTO-MCNC: 309 MG/DL (ref 70–110)
GLUCOSE BLD STRIP.AUTO-MCNC: 438 MG/DL (ref 70–110)
GLUCOSE BLD STRIP.AUTO-MCNC: 489 MG/DL (ref 70–110)
GLUCOSE SERPL-MCNC: 480 MG/DL (ref 74–99)
GLUCOSE UR STRIP.AUTO-MCNC: >1000 MG/DL
HCT VFR BLD AUTO: 37.7 % (ref 35–45)
HGB BLD-MCNC: 13 G/DL (ref 12–16)
HGB UR QL STRIP: NEGATIVE
KETONES UR QL STRIP.AUTO: NEGATIVE MG/DL
LEUKOCYTE ESTERASE UR QL STRIP.AUTO: NEGATIVE
LYMPHOCYTES # BLD: 2.1 K/UL (ref 0.9–3.6)
LYMPHOCYTES NFR BLD: 36 % (ref 21–52)
MCH RBC QN AUTO: 30 PG (ref 24–34)
MCHC RBC AUTO-ENTMCNC: 34.5 G/DL (ref 31–37)
MCV RBC AUTO: 86.9 FL (ref 74–97)
MONOCYTES # BLD: 0.7 K/UL (ref 0.05–1.2)
MONOCYTES NFR BLD: 12 % (ref 3–10)
NEUTS SEG # BLD: 2.8 K/UL (ref 1.8–8)
NEUTS SEG NFR BLD: 48 % (ref 40–73)
NITRITE UR QL STRIP.AUTO: NEGATIVE
PH UR STRIP: 5 [PH] (ref 5–8)
PLATELET # BLD AUTO: 230 K/UL (ref 135–420)
PMV BLD AUTO: 10.2 FL (ref 9.2–11.8)
POTASSIUM SERPL-SCNC: 4 MMOL/L (ref 3.5–5.5)
PROT SERPL-MCNC: 7.9 G/DL (ref 6.4–8.2)
PROT UR STRIP-MCNC: NEGATIVE MG/DL
RBC # BLD AUTO: 4.34 M/UL (ref 4.2–5.3)
SODIUM SERPL-SCNC: 132 MMOL/L (ref 136–145)
SP GR UR REFRACTOMETRY: >1.03 (ref 1–1.03)
UROBILINOGEN UR QL STRIP.AUTO: 0.2 EU/DL (ref 0.2–1)
WBC # BLD AUTO: 5.7 K/UL (ref 4.6–13.2)

## 2018-07-24 PROCEDURE — 81003 URINALYSIS AUTO W/O SCOPE: CPT | Performed by: EMERGENCY MEDICINE

## 2018-07-24 PROCEDURE — 82962 GLUCOSE BLOOD TEST: CPT

## 2018-07-24 PROCEDURE — 80053 COMPREHEN METABOLIC PANEL: CPT | Performed by: EMERGENCY MEDICINE

## 2018-07-24 PROCEDURE — 96374 THER/PROPH/DIAG INJ IV PUSH: CPT

## 2018-07-24 PROCEDURE — 74011250636 HC RX REV CODE- 250/636: Performed by: EMERGENCY MEDICINE

## 2018-07-24 PROCEDURE — 96361 HYDRATE IV INFUSION ADD-ON: CPT

## 2018-07-24 PROCEDURE — 74011636637 HC RX REV CODE- 636/637: Performed by: EMERGENCY MEDICINE

## 2018-07-24 PROCEDURE — 96376 TX/PRO/DX INJ SAME DRUG ADON: CPT

## 2018-07-24 PROCEDURE — 99284 EMERGENCY DEPT VISIT MOD MDM: CPT

## 2018-07-24 PROCEDURE — 85025 COMPLETE CBC W/AUTO DIFF WBC: CPT | Performed by: EMERGENCY MEDICINE

## 2018-07-24 RX ADMIN — HUMAN INSULIN 10 UNITS: 100 INJECTION, SOLUTION SUBCUTANEOUS at 20:53

## 2018-07-24 RX ADMIN — HUMAN INSULIN 10 UNITS: 100 INJECTION, SOLUTION SUBCUTANEOUS at 19:43

## 2018-07-24 RX ADMIN — SODIUM CHLORIDE 1000 ML: 900 INJECTION, SOLUTION INTRAVENOUS at 19:49

## 2018-07-24 NOTE — ED PROVIDER NOTES
EMERGENCY DEPARTMENT HISTORY AND PHYSICAL EXAM    Date: 7/24/2018  Patient Name: Abigail Sweeney    History of Presenting Illness     Chief Complaint   Patient presents with    High Blood Sugar         History Provided By: Patient    Chief Complaint: High blood sugar  Duration: Today  Timing:  Progressive   Location: N/A   Severity: FSBS in 400's  Modifying Factors: None  Associated Symptoms: denies any other associated signs or symptoms    Additional History (Context):   7:50 AM  Abigail Sweeney is a 79 y.o. female with PMHx of DM and HTN who presents to the emergency department C/O high blood sugar, onset today. Notes no other associated sxs. Pt was seen at her PCP this afternoon and was told that her blood sugar was reading in the 400's. She was advised to come to the ER for further evaluation. Pt takes insulin. Last blood sugar check was 4 days ago. She states her blood sugar runs in the 200's. Does not endorse tobacco use. Pt denies fever, chills, CP, SOB, or any other sxs or complaints. PCP: Jonathan Calzada MD    Current Outpatient Prescriptions   Medication Sig Dispense Refill    insulin detemir (LEVEMIR) 100 unit/mL injection 0.15 mL by SubCUTAneous route nightly. 1 Vial 0    insulin lispro (HUMALOG) 100 unit/mL injection 5 Units by SubCUTAneous route Before breakfast, lunch, and dinner. 1 Vial 0    meclizine (ANTIVERT) 25 mg tablet Take 1 tablet by mouth every 6 hours for the next 3 days. Then stop taking the meclizine. Restart the meclizine for 3 day intervals if vertigo/ dizziness returns. 20 Tab 0    levothyroxine (SYNTHROID) 100 mcg tablet Take 100 mcg by mouth Daily (before breakfast). Instructed to take morning of surgery with sip of water.  simvastatin (ZOCOR) 40 mg tablet Take 40 mg by mouth nightly.  enalapril (VASOTEC) 20 mg tablet Take 20 mg by mouth daily.  Instructed to take morning of surgery with sip of water      guaiFENesin SR (MUCINEX) 600 mg SR tablet Take 1 Tab by mouth every twelve (12) hours. 20 Tab 0    predniSONE (DELTASONE) 10 mg tablet Take 4 tabs x 2 days, 3 tabs x 2 days , 2 tabs x 2 days, 1 tab x 2 days, 0.5 tab x 2 days 21 Tab 0    levoFLOXacin (LEVAQUIN) 500 mg tablet Take 1 Tab by mouth daily. 3 Tab 0    albuterol (PROVENTIL HFA, VENTOLIN HFA, PROAIR HFA) 90 mcg/actuation inhaler Take 1 Puff by inhalation every four (4) hours as needed for Wheezing. 1 Inhaler 0    fluticasone-salmeterol (ADVAIR DISKUS) 100-50 mcg/dose diskus inhaler Take 1 Puff by inhalation every twelve (12) hours. 1 Inhaler 0       Past History     Past Medical History:  Past Medical History:   Diagnosis Date    Chronic pain     low back pain    Diabetes ([address] 2001    type II, was on oral agents for a while    Hypercholesteremia     Hypertension     Ill-defined condition     kidny stones    Thyroid disease        Past Surgical History:  Past Surgical History:   Procedure Laterality Date    HX GYN  1977    tubal ligation       Family History:  Family History   Problem Relation Age of Onset    Cancer Mother        Social History:  Social History   Substance Use Topics    Smoking status: Former Smoker     Quit date: 6/6/1998    Smokeless tobacco: Never Used    Alcohol use No      Comment: rare       Allergies:  No Known Allergies      Review of Systems   Review of Systems   Constitutional: Negative for chills and fever.        (+) high blood sugar   Respiratory: Negative for shortness of breath. Cardiovascular: Negative for chest pain. All other systems reviewed and are negative. Physical Exam     Vitals:    07/24/18 1918 07/24/18 2223   BP: 126/61 120/58   Pulse: 65 68   Resp: 18 16   Temp: 98.3 °F (36.8 °C)    SpO2: 100% 100%   Weight: 54.4 kg (120 lb)    Height: 5' 2\" (1.575 m)      Physical Exam   Constitutional: She is oriented to person, place, and time. She appears well-developed and well-nourished. No distress. HENT:   Head: Normocephalic and atraumatic.    Eyes: Pupils are equal, round, and reactive to light. Neck: Neck supple. Cardiovascular: Normal rate, regular rhythm, S1 normal, S2 normal and normal heart sounds. Pulmonary/Chest: Breath sounds normal. No respiratory distress. She has no wheezes. She has no rales. She exhibits no tenderness. Abdominal: Soft. She exhibits no distension and no mass. There is no tenderness. There is no guarding. Musculoskeletal: Normal range of motion. She exhibits no edema or tenderness. Neurological: She is alert and oriented to person, place, and time. No cranial nerve deficit. Skin: No rash noted. Psychiatric: She has a normal mood and affect. Her behavior is normal. Thought content normal.   Nursing note and vitals reviewed.      Diagnostic Study Results     Labs -     Recent Results (from the past 12 hour(s))   GLUCOSE, POC    Collection Time: 07/24/18  7:25 PM   Result Value Ref Range    Glucose (POC) 489 (HH) 70 - 110 mg/dL   URINALYSIS W/ RFLX MICROSCOPIC    Collection Time: 07/24/18  7:30 PM   Result Value Ref Range    Color YELLOW      Appearance CLEAR      Specific gravity >1.030 (H) 1.005 - 1.030    pH (UA) 5.0 5.0 - 8.0      Protein NEGATIVE  NEG mg/dL    Glucose >1000 (A) NEG mg/dL    Ketone NEGATIVE  NEG mg/dL    Bilirubin NEGATIVE  NEG      Blood NEGATIVE  NEG      Urobilinogen 0.2 0.2 - 1.0 EU/dL    Nitrites NEGATIVE  NEG      Leukocyte Esterase NEGATIVE  NEG     METABOLIC PANEL, COMPREHENSIVE    Collection Time: 07/24/18  7:49 PM   Result Value Ref Range    Sodium 132 (L) 136 - 145 mmol/L    Potassium 4.0 3.5 - 5.5 mmol/L    Chloride 94 (L) 100 - 108 mmol/L    CO2 32 21 - 32 mmol/L    Anion gap 6 3.0 - 18 mmol/L    Glucose 480 (HH) 74 - 99 mg/dL    BUN 11 7.0 - 18 MG/DL    Creatinine 1.01 0.6 - 1.3 MG/DL    BUN/Creatinine ratio 11 (L) 12 - 20      GFR est AA >60 >60 ml/min/1.73m2    GFR est non-AA 55 (L) >60 ml/min/1.73m2    Calcium 9.7 8.5 - 10.1 MG/DL    Bilirubin, total 0.3 0.2 - 1.0 MG/DL    ALT (SGPT) 17 13 - 56 U/L    AST (SGOT) 16 15 - 37 U/L    Alk. phosphatase 114 45 - 117 U/L    Protein, total 7.9 6.4 - 8.2 g/dL    Albumin 3.6 3.4 - 5.0 g/dL    Globulin 4.3 (H) 2.0 - 4.0 g/dL    A-G Ratio 0.8 0.8 - 1.7     CBC WITH AUTOMATED DIFF    Collection Time: 07/24/18  7:49 PM   Result Value Ref Range    WBC 5.7 4.6 - 13.2 K/uL    RBC 4.34 4.20 - 5.30 M/uL    HGB 13.0 12.0 - 16.0 g/dL    HCT 37.7 35.0 - 45.0 %    MCV 86.9 74.0 - 97.0 FL    MCH 30.0 24.0 - 34.0 PG    MCHC 34.5 31.0 - 37.0 g/dL    RDW 12.4 11.6 - 14.5 %    PLATELET 559 063 - 376 K/uL    MPV 10.2 9.2 - 11.8 FL    NEUTROPHILS 48 40 - 73 %    LYMPHOCYTES 36 21 - 52 %    MONOCYTES 12 (H) 3 - 10 %    EOSINOPHILS 3 0 - 5 %    BASOPHILS 1 0 - 2 %    ABS. NEUTROPHILS 2.8 1.8 - 8.0 K/UL    ABS. LYMPHOCYTES 2.1 0.9 - 3.6 K/UL    ABS. MONOCYTES 0.7 0.05 - 1.2 K/UL    ABS. EOSINOPHILS 0.2 0.0 - 0.4 K/UL    ABS. BASOPHILS 0.0 0.0 - 0.1 K/UL    DF AUTOMATED     GLUCOSE, POC    Collection Time: 07/24/18  8:45 PM   Result Value Ref Range    Glucose (POC) 438 (HH) 70 - 110 mg/dL   GLUCOSE, POC    Collection Time: 07/24/18  9:22 PM   Result Value Ref Range    Glucose (POC) 309 (H) 70 - 110 mg/dL       Radiologic Studies -    No orders to display     CT Results  (Last 48 hours)    None        CXR Results  (Last 48 hours)    None            Medical Decision Making   I am the first provider for this patient. I reviewed the vital signs, available nursing notes, past medical history, past surgical history, family history and social history. Vital Signs-Reviewed the patient's vital signs.     Pulse Oximetry Analysis - 100% on RA     Records Reviewed: Nursing Notes    Provider Notes (Medical Decision Making):     Procedures:  Procedures    MEDICATIONS GIVEN:  Medications   sodium chloride 0.9 % bolus infusion 1,000 mL (0 mL IntraVENous IV Completed 7/24/18 2054)   insulin regular (NOVOLIN R, HUMULIN R) injection 10 Units (10 Units IntraVENous Given 7/24/18 1943)   insulin regular (NOVOLIN R, HUMULIN R) injection 10 Units (10 Units IntraVENous Given 7/24/18 2053)       ED Course:   7:50 PM   Initial assessment performed. The patients presenting problems have been discussed, and they are in agreement with the care plan formulated and outlined with them. I have encouraged them to ask questions as they arise throughout their visit. Diagnosis and Disposition     DISCHARGE NOTE:  10:03 PM  Gaby Cox's  results have been reviewed with her. She has been counseled regarding her diagnosis, treatment, and plan. She verbally conveys understanding and agreement of the signs, symptoms, diagnosis, treatment and prognosis and additionally agrees to follow up as discussed. She also agrees with the care-plan and conveys that all of her questions have been answered. I have also provided discharge instructions for her that include: educational information regarding their diagnosis and treatment, and list of reasons why they would want to return to the ED prior to their follow-up appointment, should her condition change. She has been provided with education for proper emergency department utilization. CLINICAL IMPRESSION:    1. Hyperglycemia        PLAN:  1. D/C Home  2. Discharge Medication List as of 7/24/2018 10:03 PM        3. Follow-up Information     Follow up With Details Comments Contact Info    Your PCP Schedule an appointment as soon as possible for a visit in 2 days For primary care follow up     [facility] EMERGENCY DEPT  As needed, If symptoms worsen [address] [id number]  [phone]        _______________________________    Attestations: This note is prepared by Kumar Leo, acting as Scribe for Monica Rosa MD.    Monica Rosa MD:  The scribe's documentation has been prepared under my direction and personally reviewed by me in its entirety.   I confirm that the note above accurately reflects all work, treatment, procedures, and medical decision making performed by me.  _______________________________

## 2018-07-24 NOTE — ED TRIAGE NOTES
Pt arrives ambulatory to ED with c\o high BS, pt sts she was at dr orozco this afternoon when she was told her fsbs was reading in the 400s, pt denies any n/v/d, CP, SOB, dizziness, pt is able to make needs known speaking in complete sentences, pt in nad at this time

## 2018-07-25 NOTE — ED NOTES
Pt discharged home stable and ambulatory. Pain level at discharge 0. Pt discharged with family. Reviewed discharged instructions with pt who verbalized understanding.   Patient armband removed and shredded  rx x's 0

## 2018-07-25 NOTE — DISCHARGE INSTRUCTIONS
Learning About High Blood Sugar  What is high blood sugar? Your body turns the food you eat into glucose (sugar), which it uses for energy. But if your body isn't able to use the sugar right away, it can build up in your blood and lead to high blood sugar. When the amount of sugar in your blood stays too high for too much of the time, you may have diabetes. Diabetes is a disease that can cause serious health problems. The good news is that lifestyle changes may help you get your blood sugar back to normal and avoid or delay diabetes. What causes high blood sugar? Sugar (glucose) can build up in your blood if you:  · Are overweight. · Have a family history of diabetes. · Take certain medicines, such as steroids. What are the symptoms? Having high blood sugar may not cause any symptoms at all. Or it may make you feel very thirsty or very hungry. You may also urinate more often than usual, have blurry vision, or lose weight without trying. How is high blood sugar treated? You can take steps to lower your blood sugar level if you understand what makes it get higher. Your doctor may want you to learn how to test your blood sugar level at home. Then you can see how illness, stress, or different kinds of food or medicine raise or lower your blood sugar level. Other tests may be needed to see if you have diabetes. How can you prevent high blood sugar? · Watch your weight. If you're overweight, losing just a small amount of weight may help. Reducing fat around your waist is most important. · Limit the amount of calories, sweets, and unhealthy fat you eat. Ask your doctor if a dietitian can help you. A registered dietitian can help you create meal plans that fit your lifestyle. · Get at least 30 minutes of exercise on most days of the week. Exercise helps control your blood sugar. It also helps you maintain a healthy weight. Walking is a good choice.  You also may want to do other activities, such as running, swimming, cycling, or playing tennis or team sports. · If your doctor prescribed medicines, take them exactly as prescribed. Call your doctor if you think you are having a problem with your medicine. You will get more details on the specific medicines your doctor prescribes. Follow-up care is a key part of your treatment and safety. Be sure to make and go to all appointments, and call your doctor if you are having problems. It's also a good idea to know your test results and keep a list of the medicines you take. Where can you learn more? Go to http://hakeem-mehreen.info/. Enter O108 in the search box to learn more about \"Learning About High Blood Sugar. \"  Current as of: December 7, 2017  Content Version: 11.7  © 5270-1591 [employer]. Care instructions adapted under license by [employer] (which disclaims liability or warranty for this information). If you have questions about a medical condition or this instruction, always ask your healthcare professional. [address] any warranty or liability for your use of this information.

## 2019-01-01 ENCOUNTER — HOSPITAL ENCOUNTER (EMERGENCY)
Age: 69
Discharge: HOME | End: 2019-01-01
Attending: EMERGENCY MEDICINE | Admitting: EMERGENCY MEDICINE
Payer: MEDICARE

## 2019-01-01 ENCOUNTER — APPOINTMENT (OUTPATIENT)
Dept: GENERAL RADIOLOGY | Age: 69
End: 2019-01-01
Attending: NURSE PRACTITIONER
Payer: MEDICARE

## 2019-01-01 VITALS
HEART RATE: 58 BPM | SYSTOLIC BLOOD PRESSURE: 138 MMHG | HEIGHT: 63 IN | RESPIRATION RATE: 16 BRPM | TEMPERATURE: 98.9 F | OXYGEN SATURATION: 100 % | BODY MASS INDEX: 21.26 KG/M2 | WEIGHT: 120 LBS | DIASTOLIC BLOOD PRESSURE: 60 MMHG

## 2019-01-01 DIAGNOSIS — J18.9 PNEUMONIA OF LEFT LOWER LOBE DUE TO INFECTIOUS ORGANISM: Primary | ICD-10-CM

## 2019-01-01 DIAGNOSIS — E87.6 HYPOKALEMIA: ICD-10-CM

## 2019-01-01 DIAGNOSIS — R05.9 COUGH: ICD-10-CM

## 2019-01-01 DIAGNOSIS — E16.2 HYPOGLYCEMIA: ICD-10-CM

## 2019-01-01 LAB
ALBUMIN SERPL-MCNC: 3.7 G/DL (ref 3.4–5)
ALBUMIN/GLOB SERPL: 0.8 {RATIO} (ref 0.8–1.7)
ALP SERPL-CCNC: 93 U/L (ref 45–117)
ALT SERPL-CCNC: 18 U/L (ref 13–56)
ANION GAP SERPL CALC-SCNC: 3 MMOL/L (ref 3–18)
APPEARANCE UR: CLEAR
AST SERPL-CCNC: 10 U/L (ref 15–37)
BACTERIA URNS QL MICRO: ABNORMAL /HPF
BASOPHILS # BLD: 0 K/UL (ref 0–0.1)
BASOPHILS NFR BLD: 0 % (ref 0–2)
BILIRUB SERPL-MCNC: 0.4 MG/DL (ref 0.2–1)
BILIRUB UR QL: NEGATIVE
BNP SERPL-MCNC: 101 PG/ML (ref 0–900)
BUN SERPL-MCNC: 12 MG/DL (ref 7–18)
BUN/CREAT SERPL: 14
CALCIUM SERPL-MCNC: 9.2 MG/DL (ref 8.5–10.1)
CHLORIDE SERPL-SCNC: 102 MMOL/L (ref 100–108)
CO2 SERPL-SCNC: 34 MMOL/L (ref 21–32)
COLOR UR: YELLOW
CREAT SERPL-MCNC: 0.83 MG/DL (ref 0.6–1.3)
DIFFERENTIAL METHOD BLD: ABNORMAL
EOSINOPHIL # BLD: 0.2 K/UL (ref 0–0.4)
EOSINOPHIL NFR BLD: 2 % (ref 0–5)
EPITH CASTS URNS QL MICRO: ABNORMAL /LPF (ref 0–5)
ERYTHROCYTE [DISTWIDTH] IN BLOOD BY AUTOMATED COUNT: 12.9 % (ref 11.6–14.5)
FLUAV AG NPH QL IA: NEGATIVE
FLUBV AG NOSE QL IA: NEGATIVE
GLOBULIN SER CALC-MCNC: 4.4 G/DL (ref 2–4)
GLUCOSE BLD STRIP.AUTO-MCNC: 102 MG/DL (ref 70–110)
GLUCOSE BLD STRIP.AUTO-MCNC: 77 MG/DL (ref 70–110)
GLUCOSE SERPL-MCNC: 60 MG/DL (ref 74–99)
GLUCOSE UR STRIP.AUTO-MCNC: NEGATIVE MG/DL
HCT VFR BLD AUTO: 39.3 % (ref 35–45)
HGB BLD-MCNC: 12.9 G/DL (ref 12–16)
HGB UR QL STRIP: NEGATIVE
KETONES UR QL STRIP.AUTO: NEGATIVE MG/DL
LEUKOCYTE ESTERASE UR QL STRIP.AUTO: ABNORMAL
LYMPHOCYTES # BLD: 2.3 K/UL (ref 0.9–3.6)
LYMPHOCYTES NFR BLD: 28 % (ref 21–52)
MCH RBC QN AUTO: 28.9 PG (ref 24–34)
MCHC RBC AUTO-ENTMCNC: 32.8 G/DL (ref 31–37)
MCV RBC AUTO: 88.1 FL (ref 74–97)
MONOCYTES # BLD: 1 K/UL (ref 0.05–1.2)
MONOCYTES NFR BLD: 12 % (ref 3–10)
NEUTS SEG # BLD: 4.8 K/UL (ref 1.8–8)
NEUTS SEG NFR BLD: 58 % (ref 40–73)
NITRITE UR QL STRIP.AUTO: NEGATIVE
PH UR STRIP: 6 [PH] (ref 5–8)
PLATELET # BLD AUTO: 217 K/UL (ref 135–420)
PMV BLD AUTO: 9.6 FL (ref 9.2–11.8)
POTASSIUM SERPL-SCNC: 3.4 MMOL/L (ref 3.5–5.5)
PROT SERPL-MCNC: 8.1 G/DL (ref 6.4–8.2)
PROT UR STRIP-MCNC: NEGATIVE MG/DL
RBC # BLD AUTO: 4.46 M/UL (ref 4.2–5.3)
RBC #/AREA URNS HPF: ABNORMAL /HPF (ref 0–5)
SODIUM SERPL-SCNC: 139 MMOL/L (ref 136–145)
SP GR UR REFRACTOMETRY: 1.01 (ref 1–1.03)
UROBILINOGEN UR QL STRIP.AUTO: 0.2 EU/DL (ref 0.2–1)
WBC # BLD AUTO: 8.3 K/UL (ref 4.6–13.2)
WBC URNS QL MICRO: ABNORMAL /HPF (ref 0–5)

## 2019-01-01 PROCEDURE — 96365 THER/PROPH/DIAG IV INF INIT: CPT

## 2019-01-01 PROCEDURE — 87804 INFLUENZA ASSAY W/OPTIC: CPT

## 2019-01-01 PROCEDURE — 85025 COMPLETE CBC W/AUTO DIFF WBC: CPT

## 2019-01-01 PROCEDURE — 74011250637 HC RX REV CODE- 250/637: Performed by: NURSE PRACTITIONER

## 2019-01-01 PROCEDURE — 99285 EMERGENCY DEPT VISIT HI MDM: CPT

## 2019-01-01 PROCEDURE — 81001 URINALYSIS AUTO W/SCOPE: CPT

## 2019-01-01 PROCEDURE — 93005 ELECTROCARDIOGRAM TRACING: CPT

## 2019-01-01 PROCEDURE — 80053 COMPREHEN METABOLIC PANEL: CPT

## 2019-01-01 PROCEDURE — 74011250636 HC RX REV CODE- 250/636: Performed by: NURSE PRACTITIONER

## 2019-01-01 PROCEDURE — 83880 ASSAY OF NATRIURETIC PEPTIDE: CPT

## 2019-01-01 PROCEDURE — 82962 GLUCOSE BLOOD TEST: CPT

## 2019-01-01 PROCEDURE — 96366 THER/PROPH/DIAG IV INF ADDON: CPT

## 2019-01-01 PROCEDURE — 71046 X-RAY EXAM CHEST 2 VIEWS: CPT

## 2019-01-01 PROCEDURE — 87040 BLOOD CULTURE FOR BACTERIA: CPT

## 2019-01-01 RX ORDER — LEVOFLOXACIN 5 MG/ML
750 INJECTION, SOLUTION INTRAVENOUS ONCE
Status: COMPLETED | OUTPATIENT
Start: 2019-01-01 | End: 2019-01-01

## 2019-01-01 RX ORDER — ALBUTEROL SULFATE 90 UG/1
2 AEROSOL, METERED RESPIRATORY (INHALATION)
Qty: 1 INHALER | Refills: 0 | Status: SHIPPED | OUTPATIENT
Start: 2019-01-01

## 2019-01-01 RX ORDER — POTASSIUM CHLORIDE 20 MEQ/1
20 TABLET, EXTENDED RELEASE ORAL
Status: COMPLETED | OUTPATIENT
Start: 2019-01-01 | End: 2019-01-01

## 2019-01-01 RX ORDER — LEVOFLOXACIN 750 MG/1
750 TABLET ORAL DAILY
Qty: 4 TAB | Refills: 0 | Status: SHIPPED | OUTPATIENT
Start: 2019-01-01 | End: 2019-01-05

## 2019-01-01 RX ORDER — INSULIN ASPART 100 [IU]/ML
12 INJECTION, SOLUTION INTRAVENOUS; SUBCUTANEOUS
COMMUNITY

## 2019-01-01 RX ORDER — BENZONATATE 100 MG/1
100 CAPSULE ORAL
Qty: 30 CAP | Refills: 0 | Status: SHIPPED | OUTPATIENT
Start: 2019-01-01 | End: 2019-01-08

## 2019-01-01 RX ADMIN — POTASSIUM CHLORIDE 20 MEQ: 20 TABLET, EXTENDED RELEASE ORAL at 15:52

## 2019-01-01 RX ADMIN — LEVOFLOXACIN 750 MG: 5 INJECTION, SOLUTION INTRAVENOUS at 14:43

## 2019-01-01 NOTE — ED NOTES
Pt c/o productive cough + runny nose x 2 days yellow green flim, also intermittent headaches x 2 days, denies any fevers. No other complaints.

## 2019-01-01 NOTE — ED PROVIDER NOTES
EMERGENCY DEPARTMENT HISTORY AND PHYSICAL EXAM 
 
Date: 1/1/2019 Patient Name: Fern Kohli History of Presenting Illness Chief Complaint Patient presents with  Cough History Provided By: Patient Chief Complaint: cough Duration: 2 days Timing: gradually worsening Quality: productive, green phlegm Severity: moderate Associated Symptoms: headache, nasal congestion, rhinorrhea Additional History (Context):  
12:48 PM 
Fern Kohli is a 76 y.o. female with PMHX of diabetes, HTN, HLD, pneumonia, and chronic pain  who presents to the emergency department C/O productive cough with green sputum for the past 2 days. Associated sxs include headache, nasal congestion, rhinorrhea. She states that her sxs are worsening. Pt denies fever, chills, SOB, and any other sxs or complaints. PCP: Jonathan Calzada MD 
 
Current Outpatient Medications Medication Sig Dispense Refill  insulin aspart U-100 (NOVOLOG FLEXPEN U-100 INSULIN) 100 unit/mL inpn 12 Units by SubCUTAneous route Before breakfast, lunch, and dinner.  levoFLOXacin (LEVAQUIN) 750 mg tablet Take 1 Tab by mouth daily for 4 days. 4 Tab 0  
 albuterol (PROVENTIL HFA, VENTOLIN HFA, PROAIR HFA) 90 mcg/actuation inhaler Take 2 Puffs by inhalation every four (4) hours as needed for Wheezing. 1 Inhaler 0  
 inhalational spacing device (E-Z SPACER) 1 Each by Does Not Apply route as needed. 1 Device 0  
 benzonatate (TESSALON PERLES) 100 mg capsule Take 1 Cap by mouth three (3) times daily as needed for Cough for up to 7 days. 30 Cap 0  
 insulin detemir (LEVEMIR) 100 unit/mL injection 0.15 mL by SubCUTAneous route nightly. 1 Vial 0  
 meclizine (ANTIVERT) 25 mg tablet Take 1 tablet by mouth every 6 hours for the next 3 days. Then stop taking the meclizine. Restart the meclizine for 3 day intervals if vertigo/ dizziness returns.  20 Tab 0  
 levothyroxine (SYNTHROID) 100 mcg tablet Take 100 mcg by mouth Daily (before breakfast). Instructed to take morning of surgery with sip of water.  simvastatin (ZOCOR) 40 mg tablet Take 40 mg by mouth nightly.  enalapril (VASOTEC) 20 mg tablet Take 20 mg by mouth daily. Instructed to take morning of surgery with sip of water Past History Past Medical History: 
Past Medical History:  
Diagnosis Date  Chronic pain   
 low back pain  Diabetes ([address]   
 type II, was on oral agents for a while  Hypercholesteremia  Hypertension  Ill-defined condition   
 kidny stones  Thyroid disease Past Surgical History: 
Past Surgical History:  
Procedure Laterality Date  HX GYN  [id number]  
 tubal ligation  HX LITHOTRIPSY Family History: 
Family History Problem Relation Age of Onset  Cancer Mother Social History: 
Social History Tobacco Use  Smoking status: Former Smoker Last attempt to quit: 1998 Years since quittin.5  Smokeless tobacco: Never Used Substance Use Topics  Alcohol use: No  
  Comment: rare  Drug use: No  
 
 
Allergies: 
No Known Allergies Review of Systems Review of Systems Constitutional: Negative for chills and fever. HENT: Positive for congestion and rhinorrhea. Respiratory: Positive for cough. Negative for shortness of breath. Neurological: Positive for headaches. All other systems reviewed and are negative. Physical Exam  
 
Vitals:  
 19 1500 19 1504 19 1515 19 1722 BP: 120/57  138/62 138/60 Pulse:    (!) 58 Resp:    16 Temp:    98.9 °F (37.2 °C) SpO2: 99% 99% 100% 100% Weight:      
Height:      
 
Physical Exam  
Constitutional: She is oriented to person, place, and time. She appears well-developed and well-nourished. HENT:  
Head: Normocephalic and atraumatic.   
Right Ear: Hearing and tympanic membrane normal.  
Left Ear: Hearing and tympanic membrane normal.  
Nose: Nose normal.  
 Mouth/Throat: Mucous membranes are normal.  
Eyes: Conjunctivae are normal.  
Neck: Normal range of motion. Neck supple. Cardiovascular: Normal rate and regular rhythm. Pulmonary/Chest: Effort normal. No respiratory distress. She has no wheezes. She has no rales. Decreased b/l bases Abdominal: Soft. Bowel sounds are normal. She exhibits no distension. There is no tenderness. There is no rebound. Musculoskeletal: Normal range of motion. Neurological: She is alert and oriented to person, place, and time. Skin: Skin is warm and dry. Nursing note and vitals reviewed. Diagnostic Study Results Labs - Recent Results (from the past 12 hour(s)) URINALYSIS W/ RFLX MICROSCOPIC Collection Time: 01/01/19 12:50 PM  
Result Value Ref Range Color YELLOW Appearance CLEAR Specific gravity 1.007 1.005 - 1.030    
 pH (UA) 6.0 5.0 - 8.0 Protein NEGATIVE  NEG mg/dL Glucose NEGATIVE  NEG mg/dL Ketone NEGATIVE  NEG mg/dL Bilirubin NEGATIVE  NEG Blood NEGATIVE  NEG Urobilinogen 0.2 0.2 - 1.0 EU/dL Nitrites NEGATIVE  NEG Leukocyte Esterase TRACE (A) NEG URINE MICROSCOPIC ONLY Collection Time: 01/01/19 12:50 PM  
Result Value Ref Range WBC 0 to 3 0 - 5 /hpf  
 RBC 00 to 3 0 - 5 /hpf Epithelial cells FEW 0 - 5 /lpf Bacteria FEW (A) NEG /hpf INFLUENZA A & B AG (RAPID TEST) Collection Time: 01/01/19 12:55 PM  
Result Value Ref Range Influenza A Antigen NEGATIVE  NEG Influenza B Antigen NEGATIVE  NEG    
CBC WITH AUTOMATED DIFF Collection Time: 01/01/19  2:27 PM  
Result Value Ref Range WBC 8.3 4.6 - 13.2 K/uL  
 RBC 4.46 4.20 - 5.30 M/uL  
 HGB 12.9 12.0 - 16.0 g/dL HCT 39.3 35.0 - 45.0 % MCV 88.1 74.0 - 97.0 FL  
 MCH 28.9 24.0 - 34.0 PG  
 MCHC 32.8 31.0 - 37.0 g/dL  
 RDW 12.9 11.6 - 14.5 % PLATELET 819 191 - 547 K/uL MPV 9.6 9.2 - 11.8 FL  
 NEUTROPHILS 58 40 - 73 % LYMPHOCYTES 28 21 - 52 % MONOCYTES 12 (H) 3 - 10 % EOSINOPHILS 2 0 - 5 % BASOPHILS 0 0 - 2 %  
 ABS. NEUTROPHILS 4.8 1.8 - 8.0 K/UL  
 ABS. LYMPHOCYTES 2.3 0.9 - 3.6 K/UL  
 ABS. MONOCYTES 1.0 0.05 - 1.2 K/UL  
 ABS. EOSINOPHILS 0.2 0.0 - 0.4 K/UL  
 ABS. BASOPHILS 0.0 0.0 - 0.1 K/UL  
 DF AUTOMATED METABOLIC PANEL, COMPREHENSIVE Collection Time: 01/01/19  2:27 PM  
Result Value Ref Range Sodium 139 136 - 145 mmol/L Potassium 3.4 (L) 3.5 - 5.5 mmol/L Chloride 102 100 - 108 mmol/L  
 CO2 34 (H) 21 - 32 mmol/L Anion gap 3 3.0 - 18 mmol/L Glucose 60 (L) 74 - 99 mg/dL BUN 12 7.0 - 18 MG/DL Creatinine 0.83 0.6 - 1.3 MG/DL  
 BUN/Creatinine ratio 14 GFR est AA >60 >60 ml/min/1.73m2 GFR est non-AA >60 >60 ml/min/1.73m2 Calcium 9.2 8.5 - 10.1 MG/DL Bilirubin, total 0.4 0.2 - 1.0 MG/DL  
 ALT (SGPT) 18 13 - 56 U/L  
 AST (SGOT) 10 (L) 15 - 37 U/L Alk. phosphatase 93 45 - 117 U/L Protein, total 8.1 6.4 - 8.2 g/dL Albumin 3.7 3.4 - 5.0 g/dL Globulin 4.4 (H) 2.0 - 4.0 g/dL A-G Ratio 0.8 0.8 - 1.7 NT-PRO BNP Collection Time: 01/01/19  2:27 PM  
Result Value Ref Range NT pro- 0 - 900 PG/ML  
GLUCOSE, POC Collection Time: 01/01/19  4:00 PM  
Result Value Ref Range Glucose (POC) 77 70 - 110 mg/dL GLUCOSE, POC Collection Time: 01/01/19  5:20 PM  
Result Value Ref Range Glucose (POC) 102 70 - 110 mg/dL Radiologic Studies -  
XR CHEST PA LAT Final Result IMPRESSION:  
  
1. Bilateral lower lobe atelectasis versus developing infiltrates. CT Results  (Last 48 hours) None CXR Results  (Last 48 hours) 01/01/19 1332  XR CHEST PA LAT Final result Impression:  IMPRESSION:  
   
1. Bilateral lower lobe atelectasis versus developing infiltrates. Narrative:  EXAM: CHEST PA AND LATERAL. INDICATION: Cough. COMPARISON: 1/7/2017.  
   
_______________ FINDINGS:  
   
   
 Cardiac size is within normal limits. Aortic arch calcifications Mediastinal structures and pulmonary vasculature are within normal limits. Bilateral lower lobe linear densities. No evidence for pneumothorax. No evidence for pleural effusion. Multilevel spinal degenerative changes. _______________  
   
  
  
 
4:45 PM 
RADIOLOGY FINDINGS Chest X-ray shows left lobe PNA. Pending review by Radiologist 
Recorded by Kalie Goodwin ED Scribe, as dictated by ALEJANDRA Turner Medications given in the ED- Medications  
levoFLOXacin (LEVAQUIN) 750 mg in D5W IVPB (0 mg IntraVENous IV Completed 1/1/19 1619) potassium chloride (K-DUR, KLOR-CON) SR tablet 20 mEq (20 mEq Oral Given 1/1/19 1552) Medical Decision Making I am the first provider for this patient. I reviewed the vital signs, available nursing notes, past medical history, past surgical history, family history and social history. Vital Signs-Reviewed the patient's vital signs. Pulse Oximetry Analysis - 99% on RA  
 
EKG interpretation: (Preliminary) 3:27 PM  
NSR at 62 bpm, RBBB, no STEMI 
EKG read by ALEJANDRA Truner at 2:53 PM 
 
Records Reviewed: Nursing Notes Provider Notes (Medical Decision Making): Pt presents with increased cough. Labs show no signs of infection. Pt had mild hypokalemia which was replaced. CXR shows left lower lobe PNA. She is very well appearing. Lungs are CTA. No respiratory distress. Pulse ox 96 to 98% on room air. She is appropriate for outpatient treatment. Will treat with Levaquin. Strict return precautions and early follow up. Pt understands return precautions and offers no questions or concerns at this time. Procedures: 
Procedures ED Course:  
12:48 PM 
Initial assessment performed.  The patients presenting problems have been discussed, and they are in agreement with the care plan formulated and outlined with them. I have encouraged them to ask questions as they arise throughout their visit. FACE-TO-FACE PROGRESS NOTE: 
314 PM 
76year old female presenting with 2 days of cough and nasal congestion. On exam, afebrile and non toxic and appropriate vitals. CXR shows possible left lower lobe PNA and some central vascular congestion. Will obtain lab work to evaluate for CHF, Leukocytosis, bandemia that may require abx. If lab work reassuring and saturating well, may be discharged with outpatient abx. I have personally seen and examined the patient, reviewed the JR's note and agree with findings and plan. Written by Luiza Zelaya ED Scribe, as dictated by General Kade DO. 
 
4:41 PM Reviewed all results with pt and she understands reasons to return to ED. Discharged by provider. Pt is offering no questions or concerns at this time. Diagnosis and Disposition DISCHARGE NOTE: 
4:43 PM 
Gaby Cox's  results have been reviewed with her. She has been counseled regarding her diagnosis, treatment, and plan. She verbally conveys understanding and agreement of the signs, symptoms, diagnosis, treatment and prognosis and additionally agrees to follow up as discussed. She also agrees with the care-plan and conveys that all of her questions have been answered. I have also provided discharge instructions for her that include: educational information regarding their diagnosis and treatment, and list of reasons why they would want to return to the ED prior to their follow-up appointment, should her condition change. She has been provided with education for proper emergency department utilization. CLINICAL IMPRESSION: 
 
1. Pneumonia of left lower lobe due to infectious organism [facility]) 2. Cough 3. Hypokalemia 4. Hypoglycemia PLAN: 
1. D/C Home 2. Discharge Medication List as of 1/1/2019  4:45 PM  
  
START taking these medications Details levoFLOXacin (LEVAQUIN) 750 mg tablet Take 1 Tab by mouth daily for 4 days. , Normal, Disp-4 Tab, R-0  
  
albuterol (PROVENTIL HFA, VENTOLIN HFA, PROAIR HFA) 90 mcg/actuation inhaler Take 2 Puffs by inhalation every four (4) hours as needed for Wheezing., Normal, Disp-1 Inhaler, R-0  
  
inhalational spacing device (E-Z SPACER) 1 Each by Does Not Apply route as needed., Normal, Disp-1 Device, R-0  
  
benzonatate (TESSALON PERLES) 100 mg capsule Take 1 Cap by mouth three (3) times daily as needed for Cough for up to 7 days. , Normal, Disp-30 Cap, R-0  
  
  
CONTINUE these medications which have NOT CHANGED Details  
insulin aspart U-100 (NOVOLOG FLEXPEN U-100 INSULIN) 100 unit/mL inpn 12 Units by SubCUTAneous route Before breakfast, lunch, and dinner., Historical Med  
  
insulin detemir (LEVEMIR) 100 unit/mL injection 0.15 mL by SubCUTAneous route nightly., Normal, Disp-1 Vial, R-0  
  
meclizine (ANTIVERT) 25 mg tablet Take 1 tablet by mouth every 6 hours for the next 3 days. Then stop taking the meclizine. Restart the meclizine for 3 day intervals if vertigo/ dizziness returns. , Print, Disp-20 Tab, R-0  
  
levothyroxine (SYNTHROID) 100 mcg tablet Take 100 mcg by mouth Daily (before breakfast). Instructed to take morning of surgery with sip of water., Historical Med  
  
simvastatin (ZOCOR) 40 mg tablet Take 40 mg by mouth nightly. Historical Med, 40 mg  
  
enalapril (VASOTEC) 20 mg tablet Take 20 mg by mouth daily. Instructed to take morning of surgery with sip of water, Historical Med 3. Follow-up Information Follow up With Specialties Details Why Contact Info Norma Lobato, DO Internal Medicine Schedule an appointment as soon as possible for a visit For Primary Care Follow Up [address] [place] [address] [address] [place] [address] 
[phone] [facility] Emergency Medicine Go to If symptoms worsen, As needed 2 Cristela Carr 
[address] [address] 
[phone] _______________________________ Attestations: This note is prepared by Holden Coughlin, acting as Scribe for DAYNE Hitchcock-BC. DAYNE Hitchcock-BC:  The scribe's documentation has been prepared under my direction and personally reviewed by me in its entirety. I confirm that the note above accurately reflects all work, treatment, procedures, and medical decision making performed by me. This note is prepared by William Pulido, acting as Scribe for General Kade DO. General Kade DO:  The scribe's documentation has been prepared under my direction and personally reviewed by me in its entirety. I confirm that the note above accurately reflects all work, treatment, procedures, and medical decision making performed by me. 
_______________________________

## 2019-01-01 NOTE — DISCHARGE INSTRUCTIONS
Follow up as directed  Take medications as prescribed  Return to the ED for increased cough, fever, SOB, lethargy, difficulty breathing or worsening of symptoms  Increase oral fluid intake           Cough: Care Instructions  Your Care Instructions    A cough is your body's response to something that bothers your throat or airways. Many things can cause a cough. You might cough because of a cold or the flu, bronchitis, or asthma. Smoking, postnasal drip, allergies, and stomach acid that backs up into your throat also can cause coughs. A cough is a symptom, not a disease. Most coughs stop when the cause, such as a cold, goes away. You can take a few steps at home to cough less and feel better. Follow-up care is a key part of your treatment and safety. Be sure to make and go to all appointments, and call your doctor if you are having problems. It's also a good idea to know your test results and keep a list of the medicines you take. How can you care for yourself at home? · Drink lots of water and other fluids. This helps thin the mucus and soothes a dry or sore throat. Honey or lemon juice in hot water or tea may ease a dry cough. · Take cough medicine as directed by your doctor. · Prop up your head on pillows to help you breathe and ease a dry cough. · Try cough drops to soothe a dry or sore throat. Cough drops don't stop a cough. Medicine-flavored cough drops are no better than candy-flavored drops or hard candy. · Do not smoke. Avoid secondhand smoke. If you need help quitting, talk to your doctor about stop-smoking programs and medicines. These can increase your chances of quitting for good. When should you call for help? Call 911 anytime you think you may need emergency care. For example, call if:    · You have severe trouble breathing.    Call your doctor now or seek immediate medical care if:    · You cough up blood.     · You have new or worse trouble breathing.     · You have a new or higher fever.   · You have a new rash.    Watch closely for changes in your health, and be sure to contact your doctor if:    · You cough more deeply or more often, especially if you notice more mucus or a change in the color of your mucus.     · You have new symptoms, such as a sore throat, an earache, or sinus pain.     · You do not get better as expected. Where can you learn more? Go to http://hakeem-mehreen.info/. Enter D279 in the search box to learn more about \"Cough: Care Instructions. \"  Current as of: December 6, 2017  Content Version: 11.8  © 9709-1796 [employer]. Care instructions adapted under license by [employer] (which disclaims liability or warranty for this information). If you have questions about a medical condition or this instruction, always ask your healthcare professional. [address] any warranty or liability for your use of this information. Hypokalemia: Care Instructions  Your Care Instructions    Hypokalemia (say \"iu-[id number]-DAVID-joey-uh\") is a low level of potassium. The heart, muscles, kidneys, and nervous system all need potassium to work well. This problem has many different causes. Kidney problems, diet, and medicines like diuretics and laxatives can cause it. So can vomiting or diarrhea. In some cases, cancer is the cause. Your doctor may do tests to find the cause of your low potassium levels. You may need medicines to bring your potassium levels back to normal. You may also need regular blood tests to check your potassium. If you have very low potassium, you may need intravenous (IV) medicines. You also may need tests to check the electrical activity of your heart. Heart problems caused by low potassium levels can be very serious. Follow-up care is a key part of your treatment and safety. Be sure to make and go to all appointments, and call your doctor if you are having problems.  It's also a good idea to know your test results and keep a list of the medicines you take. How can you care for yourself at home? · If your doctor recommends it, eat foods that have a lot of potassium. These include fresh fruits, juices, and vegetables. They also include nuts, beans, and milk. · Be safe with medicines. If your doctor prescribes medicines or potassium supplements, take them exactly as directed. Call your doctor if you have any problems with your medicines. · Get your potassium levels tested as often as your doctor tells you. When should you call for help? Call 911 anytime you think you may need emergency care. For example, call if:    · You feel like your heart is missing beats. Heart problems caused by low potassium can cause death.     · You passed out (lost consciousness).     · You have a seizure.    Call your doctor now or seek immediate medical care if:    · You feel weak or unusually tired.     · You have severe arm or leg cramps.     · You have tingling or numbness.     · You feel sick to your stomach, or you vomit.     · You have belly cramps.     · You feel bloated or constipated.     · You have to urinate a lot.     · You feel very thirsty most of the time.     · You are dizzy or lightheaded, or you feel like you may faint.     · You feel depressed, or you lose touch with reality.    Watch closely for changes in your health, and be sure to contact your doctor if:    · You do not get better as expected. Where can you learn more? Go to http://hakeem-mehreen.info/. Enter G358 in the search box to learn more about \"Hypokalemia: Care Instructions. \"  Current as of: March 15, 2018  Content Version: 11.8  © 4642-2301 [employer]. Care instructions adapted under license by [employer] (which disclaims liability or warranty for this information).  If you have questions about a medical condition or this instruction, always ask your healthcare professional. [employer] disclaims any warranty or liability for your use of this information. Pneumonia: Care Instructions  Your Care Instructions    Pneumonia is an infection of the lungs. Most cases are caused by infections from bacteria or viruses. Pneumonia may be mild or very severe. If it is caused by bacteria, you will be treated with antibiotics. It may take a few weeks to a few months to recover fully from pneumonia, depending on how sick you were and whether your overall health is good. Follow-up care is a key part of your treatment and safety. Be sure to make and go to all appointments, and call your doctor if you are having problems. It's also a good idea to know your test results and keep a list of the medicines you take. How can you care for yourself at home? · Take your antibiotics exactly as directed. Do not stop taking the medicine just because you are feeling better. You need to take the full course of antibiotics. · Take your medicines exactly as prescribed. Call your doctor if you think you are having a problem with your medicine. · Get plenty of rest and sleep. You may feel weak and tired for a while, but your energy level will improve with time. · To prevent dehydration, drink plenty of fluids, enough so that your urine is light yellow or clear like water. Choose water and other caffeine-free clear liquids until you feel better. If you have kidney, heart, or liver disease and have to limit fluids, talk with your doctor before you increase the amount of fluids you drink. · Take care of your cough so you can rest. A cough that brings up mucus from your lungs is common with pneumonia. It is one way your body gets rid of the infection. But if coughing keeps you from resting or causes severe fatigue and chest-wall pain, talk to your doctor. He or she may suggest that you take a medicine to reduce the cough. · Use a vaporizer or humidifier to add moisture to your bedroom.  Follow the directions for cleaning the machine. · Do not smoke or allow others to smoke around you. Smoke will make your cough last longer. If you need help quitting, talk to your doctor about stop-smoking programs and medicines. These can increase your chances of quitting for good. · Take an over-the-counter pain medicine, such as acetaminophen (Tylenol), ibuprofen (Advil, Motrin), or naproxen (Aleve). Read and follow all instructions on the label. · Do not take two or more pain medicines at the same time unless the doctor told you to. Many pain medicines have acetaminophen, which is Tylenol. Too much acetaminophen (Tylenol) can be harmful. · If you were given a spirometer to measure how well your lungs are working, use it as instructed. This can help your doctor tell how your recovery is going. · To prevent pneumonia in the future, talk to your doctor about getting a flu vaccine (once a year) and a pneumococcal vaccine (one time only for most people). When should you call for help? Call 911 anytime you think you may need emergency care. For example, call if:    · You have severe trouble breathing.    Call your doctor now or seek immediate medical care if:    · You cough up dark brown or bloody mucus (sputum).     · You have new or worse trouble breathing.     · You are dizzy or lightheaded, or you feel like you may faint.    Watch closely for changes in your health, and be sure to contact your doctor if:    · You have a new or higher fever.     · You are coughing more deeply or more often.     · You are not getting better after 2 days (48 hours).     · You do not get better as expected. Where can you learn more? Go to http://hakeem-mehreen.info/. Enter [id number] in the search box to learn more about \"Pneumonia: Care Instructions. \"  Current as of: December 6, 2017  Content Version: 11.8  © 5051-1404 [employer], [employer].  Care instructions adapted under license by [employer] (which disclaims liability or warranty for this information). If you have questions about a medical condition or this instruction, always ask your healthcare professional. [place] [address] any warranty or liability for your use of this information.

## 2019-01-01 NOTE — ED TRIAGE NOTES
Pt c/o productive cough onset 2 days ago, pt states worse today, runny nose, nasal congestion, headache,

## 2019-01-07 LAB
BACTERIA SPEC CULT: NORMAL
BACTERIA SPEC CULT: NORMAL
SERVICE CMNT-IMP: NORMAL
SERVICE CMNT-IMP: NORMAL

## 2019-01-09 ENCOUNTER — HOSPITAL ENCOUNTER (EMERGENCY)
Age: 69
Discharge: HOME | End: 2019-01-09
Attending: EMERGENCY MEDICINE | Admitting: EMERGENCY MEDICINE
Payer: MEDICARE

## 2019-01-09 ENCOUNTER — APPOINTMENT (OUTPATIENT)
Dept: GENERAL RADIOLOGY | Age: 69
End: 2019-01-09
Attending: NURSE PRACTITIONER
Payer: MEDICARE

## 2019-01-09 VITALS
BODY MASS INDEX: 21.79 KG/M2 | TEMPERATURE: 97.9 F | DIASTOLIC BLOOD PRESSURE: 53 MMHG | WEIGHT: 123 LBS | HEART RATE: 65 BPM | SYSTOLIC BLOOD PRESSURE: 130 MMHG | RESPIRATION RATE: 16 BRPM | OXYGEN SATURATION: 100 % | HEIGHT: 63 IN

## 2019-01-09 DIAGNOSIS — R05.9 COUGH: Primary | ICD-10-CM

## 2019-01-09 PROCEDURE — 99282 EMERGENCY DEPT VISIT SF MDM: CPT

## 2019-01-09 PROCEDURE — 71046 X-RAY EXAM CHEST 2 VIEWS: CPT

## 2019-01-09 NOTE — ED TRIAGE NOTES
Patient reports she was here last Tuesday and was diagnosed with pneumonia. Patient reports she had a follow up with Dr. Margy Argueta and she was late so they would not evaluate her so she came to the ER.

## 2019-01-09 NOTE — ED PROVIDER NOTES
EMERGENCY DEPARTMENT HISTORY AND PHYSICAL EXAM 
 
Date: 1/9/2019 Patient Name: Florina Lancaster History of Presenting Illness Chief Complaint Patient presents with  Cough History Provided By: Patient Chief Complaint: cough Duration: 10 Days Timing:  persistent Modifying Factors: levaquin Associated Symptoms: congestion Additional History (Context):  
3:21 PM 
Florina Lancaster is a 76 y.o. female who presents to the emergency department C/O persistent cough productive phlegm onset 10 days ago. Associated sxs include nasal congestion. Pt was seen by this facility for same 8 days ago ago, had xray done, was dx with PNA, and was rx Levaquin which she finished and feels a little better. Scheduled to follow up with Nohelia Stein DO today. States she went to the wrong office and could not been seen because she was late. Pt denies fever and any other sxs or complaints. PCP: Zheng, MD Jonathan 
 
Current Outpatient Medications Medication Sig Dispense Refill  insulin aspart U-100 (NOVOLOG FLEXPEN U-100 INSULIN) 100 unit/mL inpn 12 Units by SubCUTAneous route Before breakfast, lunch, and dinner.  albuterol (PROVENTIL HFA, VENTOLIN HFA, PROAIR HFA) 90 mcg/actuation inhaler Take 2 Puffs by inhalation every four (4) hours as needed for Wheezing. 1 Inhaler 0  
 inhalational spacing device (E-Z SPACER) 1 Each by Does Not Apply route as needed. 1 Device 0  
 insulin detemir (LEVEMIR) 100 unit/mL injection 0.15 mL by SubCUTAneous route nightly. 1 Vial 0  
 meclizine (ANTIVERT) 25 mg tablet Take 1 tablet by mouth every 6 hours for the next 3 days. Then stop taking the meclizine. Restart the meclizine for 3 day intervals if vertigo/ dizziness returns. 20 Tab 0  
 levothyroxine (SYNTHROID) 100 mcg tablet Take 100 mcg by mouth Daily (before breakfast). Instructed to take morning of surgery with sip of water.  simvastatin (ZOCOR) 40 mg tablet Take 40 mg by mouth nightly.  enalapril (VASOTEC) 20 mg tablet Take 20 mg by mouth daily. Instructed to take morning of surgery with sip of water Past History Past Medical History: 
Past Medical History:  
Diagnosis Date  Chronic pain   
 low back pain  Diabetes ([facility] [address]   
 type II, was on oral agents for a while  Hypercholesteremia  Hypertension  Ill-defined condition   
 kidny stones  Thyroid disease Past Surgical History: 
Past Surgical History:  
Procedure Laterality Date  HX GYN  [id number]  
 tubal ligation  HX LITHOTRIPSY Family History: 
Family History Problem Relation Age of Onset  Cancer Mother Social History: 
Social History Tobacco Use  Smoking status: Former Smoker Last attempt to quit: 1998 Years since quittin.6  Smokeless tobacco: Never Used Substance Use Topics  Alcohol use: No  
  Comment: rare  Drug use: No  
 
 
Allergies: 
No Known Allergies Review of Systems Review of Systems Constitutional: Negative for fever. HENT: Positive for congestion. Respiratory: Positive for cough. All other systems reviewed and are negative. Physical Exam  
 
Vitals:  
 19 1510 BP: 130/53 Pulse: 65 Resp: 16 Temp: 97.9 °F (36.6 °C) SpO2: 100% Weight: 55.8 kg (123 lb) Height: 5' 3\" (1.6 m) Physical Exam  
Constitutional: She is oriented to person, place, and time. She appears well-developed and well-nourished. Well appearing HENT:  
Head: Normocephalic and atraumatic. Eyes: Conjunctivae are normal.  
Neck: Normal range of motion. Neck supple. Cardiovascular: Normal rate and regular rhythm. Pulmonary/Chest: Effort normal and breath sounds normal.  
Abdominal: Soft. Bowel sounds are normal. There is no tenderness. There is no rebound and no guarding. Musculoskeletal: Normal range of motion. Neurological: She is alert and oriented to person, place, and time. Skin: Skin is warm and dry. Nursing note and vitals reviewed. Diagnostic Study Results Labs - No results found for this or any previous visit (from the past 12 hour(s)). Radiologic Studies -  
XR CHEST PA LAT Final Result IMPRESSION:   
1. Bibasilar linear areas of scarring and/or atelectasis without superimposed  
acute pneumonic consolidation or pleural effusion demonstrated. As read by the radiologist.  
 
CT Results  (Last 48 hours) None CXR Results  (Last 48 hours) 01/09/19 1530  XR CHEST PA LAT Final result Impression:  IMPRESSION:   
1. Bibasilar linear areas of scarring and/or atelectasis without superimposed  
acute pneumonic consolidation or pleural effusion demonstrated. Narrative:  EXAM: XR CHEST PA LAT INDICATION: Cough, recent diagnosis of pneumonia COMPARISON: CT scan of the chest 1/7/2017; chest radiograph 1/1/2019. FINDINGS: PA and lateral radiographs of the chest demonstrate linear areas of  
opacity at each lung base without focal pneumonic consolidation, pneumothorax,  
or pleural effusion. Cardiac size and mediastinal contours are stable, and  
remain within normal limits. No acute osseous abnormality is present, with mild  
mid thoracic spondylosis noted. Medications given in the ED- Medications - No data to display Medical Decision Making I am the first provider for this patient. I reviewed the vital signs, available nursing notes, past medical history, past surgical history, family history and social history. Vital Signs-Reviewed the patient's vital signs. Pulse Oximetry Analysis - 100% on RA Records Reviewed: Nursing Notes and Old Medical Records Review of Medical Records: Patient was seen in this ED 1/1/19 and diagnosed with left lower lobe PNA and placed on Levaquin and discharged.  
 
Provider Notes (Medical Decision Making): Patient presents to the ED for re-evaluation of recent pneumonia as she was unable to see her PCP today. She is very well appearing, vitals signs show pulse ox of 100 % on RA, lungs are CTA. Chest xray does not shows any further pneumonia but does show scarring. She was given pulmonary for follow up, understands reasons to return and is offering no questions or concerns Procedures: 
Procedures ED Course:  
3:20 PM Initial assessment performed. The patients presenting problems have been discussed, and they are in agreement with the care plan formulated and outlined with them. I have encouraged them to ask questions as they arise throughout their visit. 3:40: Patient updated on all results, they understand reasons to return and are offering no further questions or concerns at this time. Will give pulmonary follow up Diagnosis and Disposition DISCHARGE NOTE: 
3:41 PM 
Gaby Cox's  results have been reviewed with her. She has been counseled regarding her diagnosis, treatment, and plan. She verbally conveys understanding and agreement of the signs, symptoms, diagnosis, treatment and prognosis and additionally agrees to follow up as discussed. She also agrees with the care-plan and conveys that all of her questions have been answered. I have also provided discharge instructions for her that include: educational information regarding their diagnosis and treatment, and list of reasons why they would want to return to the ED prior to their follow-up appointment, should her condition change. She has been provided with education for proper emergency department utilization. CLINICAL IMPRESSION: 
 
1. Cough PLAN: 
1. D/C Home 2. Current Discharge Medication List  
  
 
3. Follow-up Information Follow up With Specialties Details Why Contact Info Belinda Polanco MD Pulmonary Disease Schedule an appointment as soon as possible for a visit For follow up with pulmonary  [address] [place] [address] [address] [facility] 
[phone] Alvares Pastamanda, DO Internal Medicine Schedule an appointment as soon as possible for a visit For primary care follow up [address] [place] [address] [address] [place] [address] 
[phone] [facility] EMERGENCY DEPT Emergency Medicine Go to As needed, as symptoms worsen [address] [place] [address] 
[phone]  
  
 
_______________________________ Attestations: This note is prepared by Ignacio Ingram, acting as Scribe for Lewis Reyes NP. Lewis Reyes NP:  The scribe's documentation has been prepared under my direction and personally reviewed by me in its entirety. I confirm that the note above accurately reflects all work, treatment, procedures, and medical decision making performed by me. 
_______________________________

## 2019-01-09 NOTE — DISCHARGE INSTRUCTIONS
Follow up as directed  Return to the ED for increased cough, sob, fever, difficulty breathing or worsening of symptoms  Increase oral fluid intake          Patient Education        Cough: Care Instructions  Your Care Instructions    A cough is your body's response to something that bothers your throat or airways. Many things can cause a cough. You might cough because of a cold or the flu, bronchitis, or asthma. Smoking, postnasal drip, allergies, and stomach acid that backs up into your throat also can cause coughs. A cough is a symptom, not a disease. Most coughs stop when the cause, such as a cold, goes away. You can take a few steps at home to cough less and feel better. Follow-up care is a key part of your treatment and safety. Be sure to make and go to all appointments, and call your doctor if you are having problems. It's also a good idea to know your test results and keep a list of the medicines you take. How can you care for yourself at home? · Drink lots of water and other fluids. This helps thin the mucus and soothes a dry or sore throat. Honey or lemon juice in hot water or tea may ease a dry cough. · Take cough medicine as directed by your doctor. · Prop up your head on pillows to help you breathe and ease a dry cough. · Try cough drops to soothe a dry or sore throat. Cough drops don't stop a cough. Medicine-flavored cough drops are no better than candy-flavored drops or hard candy. · Do not smoke. Avoid secondhand smoke. If you need help quitting, talk to your doctor about stop-smoking programs and medicines. These can increase your chances of quitting for good. When should you call for help? Call 911 anytime you think you may need emergency care.  For example, call if:    · You have severe trouble breathing.    Call your doctor now or seek immediate medical care if:    · You cough up blood.     · You have new or worse trouble breathing.     · You have a new or higher fever.     · You have a new rash.    Watch closely for changes in your health, and be sure to contact your doctor if:    · You cough more deeply or more often, especially if you notice more mucus or a change in the color of your mucus.     · You have new symptoms, such as a sore throat, an earache, or sinus pain.     · You do not get better as expected. Where can you learn more? Go to http://hakeem-mehreen.info/. Enter D279 in the search box to learn more about \"Cough: Care Instructions. \"  Current as of: December 6, 2017  Content Version: 11.8  © 9528-6658 [employer]. Care instructions adapted under license by [employer] (which disclaims liability or warranty for this information). If you have questions about a medical condition or this instruction, always ask your healthcare professional. [address] any warranty or liability for your use of this information.

## 2019-01-22 ENCOUNTER — HOSPITAL ENCOUNTER (OUTPATIENT)
Dept: GENERAL RADIOLOGY | Age: 69
Discharge: HOME | End: 2019-01-22
Attending: FAMILY MEDICINE
Payer: MEDICARE

## 2019-01-22 DIAGNOSIS — R13.19 ESOPHAGEAL DYSPHAGIA: ICD-10-CM

## 2019-01-22 PROCEDURE — 74011000250 HC RX REV CODE- 250

## 2019-01-22 PROCEDURE — 74220 X-RAY XM ESOPHAGUS 1CNTRST: CPT

## 2019-01-22 PROCEDURE — 74011000255 HC RX REV CODE- 255

## 2019-01-22 RX ADMIN — BARIUM SULFATE 88 G: 960 POWDER, FOR SUSPENSION ORAL at 09:01

## 2019-01-22 RX ADMIN — ANTACID/ANTIFLATULENT 4 G: 380; 550; 10; 10 GRANULE, EFFERVESCENT ORAL at 09:02

## 2019-01-22 RX ADMIN — BARIUM SULFATE 135 ML: 980 POWDER, FOR SUSPENSION ORAL at 09:02

## 2019-02-24 ENCOUNTER — HOSPITAL ENCOUNTER (EMERGENCY)
Age: 69
Discharge: HOME | End: 2019-02-24
Attending: EMERGENCY MEDICINE
Payer: MEDICARE

## 2019-02-24 ENCOUNTER — APPOINTMENT (OUTPATIENT)
Dept: GENERAL RADIOLOGY | Age: 69
End: 2019-02-24
Attending: EMERGENCY MEDICINE
Payer: MEDICARE

## 2019-02-24 VITALS
SYSTOLIC BLOOD PRESSURE: 120 MMHG | OXYGEN SATURATION: 99 % | DIASTOLIC BLOOD PRESSURE: 60 MMHG | TEMPERATURE: 99.4 F | WEIGHT: 123 LBS | HEART RATE: 68 BPM | HEIGHT: 63 IN | BODY MASS INDEX: 21.79 KG/M2 | RESPIRATION RATE: 18 BRPM

## 2019-02-24 DIAGNOSIS — R81 GLUCOSE FOUND IN URINE ON EXAMINATION: ICD-10-CM

## 2019-02-24 DIAGNOSIS — R05.9 COUGH: Primary | ICD-10-CM

## 2019-02-24 DIAGNOSIS — M94.0 COSTOCHONDRITIS: ICD-10-CM

## 2019-02-24 LAB
APPEARANCE UR: CLEAR
ATRIAL RATE: 65 BPM
BILIRUB UR QL: NEGATIVE
CALCULATED P AXIS, ECG09: 22 DEGREES
CALCULATED R AXIS, ECG10: 46 DEGREES
CALCULATED T AXIS, ECG11: 65 DEGREES
COLOR UR: YELLOW
DIAGNOSIS, 93000: NORMAL
FLUAV AG NPH QL IA: NEGATIVE
FLUBV AG NOSE QL IA: NEGATIVE
GLUCOSE UR STRIP.AUTO-MCNC: >1000 MG/DL
HGB UR QL STRIP: NEGATIVE
KETONES UR QL STRIP.AUTO: NEGATIVE MG/DL
LEUKOCYTE ESTERASE UR QL STRIP.AUTO: NEGATIVE
NITRITE UR QL STRIP.AUTO: NEGATIVE
P-R INTERVAL, ECG05: 118 MS
PH UR STRIP: 5 [PH] (ref 5–8)
PROT UR STRIP-MCNC: NEGATIVE MG/DL
Q-T INTERVAL, ECG07: 408 MS
QRS DURATION, ECG06: 102 MS
QTC CALCULATION (BEZET), ECG08: 424 MS
SP GR UR REFRACTOMETRY: 1.02 (ref 1–1.03)
UROBILINOGEN UR QL STRIP.AUTO: 0.2 EU/DL (ref 0.2–1)
VENTRICULAR RATE, ECG03: 65 BPM

## 2019-02-24 PROCEDURE — 93005 ELECTROCARDIOGRAM TRACING: CPT

## 2019-02-24 PROCEDURE — 87804 INFLUENZA ASSAY W/OPTIC: CPT

## 2019-02-24 PROCEDURE — 99283 EMERGENCY DEPT VISIT LOW MDM: CPT

## 2019-02-24 PROCEDURE — 71046 X-RAY EXAM CHEST 2 VIEWS: CPT

## 2019-02-24 PROCEDURE — 81003 URINALYSIS AUTO W/O SCOPE: CPT

## 2019-02-24 RX ORDER — BENZONATATE 100 MG/1
100 CAPSULE ORAL
Qty: 30 CAP | Refills: 0 | Status: SHIPPED | OUTPATIENT
Start: 2019-02-24 | End: 2019-03-03

## 2019-02-24 NOTE — ED TRIAGE NOTES
Pt c/o productive cough with yellowish phlegm onset a few days ago, pt c/o headache, c/o chest pain with deep inspiration and coughing, pt is extremely tender to touch across chest. And upper rt back

## 2019-02-24 NOTE — DISCHARGE INSTRUCTIONS
Increase oral fluid intake   Return to the ED for SOB, chest pain, cough, lethargy, fever, increased cough, chills or worsening of symptoms  Follow up as directed, Take medications as prescribed       Patient Education        Cough: Care Instructions  Your Care Instructions    A cough is your body's response to something that bothers your throat or airways. Many things can cause a cough. You might cough because of a cold or the flu, bronchitis, or asthma. Smoking, postnasal drip, allergies, and stomach acid that backs up into your throat also can cause coughs. A cough is a symptom, not a disease. Most coughs stop when the cause, such as a cold, goes away. You can take a few steps at home to cough less and feel better. Follow-up care is a key part of your treatment and safety. Be sure to make and go to all appointments, and call your doctor if you are having problems. It's also a good idea to know your test results and keep a list of the medicines you take. How can you care for yourself at home? · Drink lots of water and other fluids. This helps thin the mucus and soothes a dry or sore throat. Honey or lemon juice in hot water or tea may ease a dry cough. · Take cough medicine as directed by your doctor. · Prop up your head on pillows to help you breathe and ease a dry cough. · Try cough drops to soothe a dry or sore throat. Cough drops don't stop a cough. Medicine-flavored cough drops are no better than candy-flavored drops or hard candy. · Do not smoke. Avoid secondhand smoke. If you need help quitting, talk to your doctor about stop-smoking programs and medicines. These can increase your chances of quitting for good. When should you call for help? Call 911 anytime you think you may need emergency care.  For example, call if:    · You have severe trouble breathing.    Call your doctor now or seek immediate medical care if:    · You cough up blood.     · You have new or worse trouble breathing.     · You have a new or higher fever.     · You have a new rash.    Watch closely for changes in your health, and be sure to contact your doctor if:    · You cough more deeply or more often, especially if you notice more mucus or a change in the color of your mucus.     · You have new symptoms, such as a sore throat, an earache, or sinus pain.     · You do not get better as expected. Where can you learn more? Go to http://hakeem-mehreen.info/. Enter D279 in the search box to learn more about \"Cough: Care Instructions. \"  Current as of: September 5, 2018  Content Version: 11.9  © 1540-5042 [employer]. Care instructions adapted under license by [employer] (which disclaims liability or warranty for this information). If you have questions about a medical condition or this instruction, always ask your healthcare professional. [address] any warranty or liability for your use of this information.

## 2019-02-24 NOTE — ED PROVIDER NOTES
EMERGENCY DEPARTMENT HISTORY AND PHYSICAL EXAM 
 
Date: 2/24/2019 Patient Name: Ada Feldman History of Presenting Illness Chief Complaint Patient presents with  Cough History Provided By: Patient Chief Complaint: Productive cough Duration: 2-3 Days Timing:  Acute Location: Respiratory Quality: Productive of yellowish mucous Severity: Mild Modifying Factors: No relief with OTC cough medication Associated Symptoms: CP and BP Additional History (Context):  
2:14 PM 
Ada Feldman is a 76 y.o. female with PMHX of HTN, HLD, and thyroid disease who presents to the emergency department C/O worsening cough, onset 2-3 days ago. No relief with OTC cough medication. Associated sxs include CP with deep inspiration and upper BP. Sxs onset a few days ago with worsening yesterday. Pt was recently seen by her PCP Alek Hutchison DO, and no concerns were noted. Endorses SHx of smoking tobacco use (quit). Pt denies fever, and any other sxs or complaints. PCP: Zheng, MD Jonathan 
 
Current Outpatient Medications Medication Sig Dispense Refill  benzonatate (TESSALON PERLES) 100 mg capsule Take 1 Cap by mouth three (3) times daily as needed for Cough for up to 7 days. 30 Cap 0  
 insulin aspart U-100 (NOVOLOG FLEXPEN U-100 INSULIN) 100 unit/mL inpn 12 Units by SubCUTAneous route Before breakfast, lunch, and dinner.  albuterol (PROVENTIL HFA, VENTOLIN HFA, PROAIR HFA) 90 mcg/actuation inhaler Take 2 Puffs by inhalation every four (4) hours as needed for Wheezing. 1 Inhaler 0  
 inhalational spacing device (E-Z SPACER) 1 Each by Does Not Apply route as needed. 1 Device 0  
 insulin detemir (LEVEMIR) 100 unit/mL injection 0.15 mL by SubCUTAneous route nightly. 1 Vial 0  
 meclizine (ANTIVERT) 25 mg tablet Take 1 tablet by mouth every 6 hours for the next 3 days. Then stop taking the meclizine. Restart the meclizine for 3 day intervals if vertigo/ dizziness returns.  20 Tab 0  
  levothyroxine (SYNTHROID) 100 mcg tablet Take 100 mcg by mouth Daily (before breakfast). Instructed to take morning of surgery with sip of water.  simvastatin (ZOCOR) 40 mg tablet Take 40 mg by mouth nightly.  enalapril (VASOTEC) 20 mg tablet Take 20 mg by mouth daily. Instructed to take morning of surgery with sip of water Past History Past Medical History: 
Past Medical History:  
Diagnosis Date  Chronic pain   
 low back pain  Diabetes ([facility] [address]   
 type II, was on oral agents for a while  Hypercholesteremia  Hypertension  Ill-defined condition   
 kidny stones  Thyroid disease Past Surgical History: 
Past Surgical History:  
Procedure Laterality Date  HX GYN  [id number]  
 tubal ligation  HX LITHOTRIPSY Family History: 
Family History Problem Relation Age of Onset  Cancer Mother Social History: 
Social History Tobacco Use  Smoking status: Former Smoker Last attempt to quit: 1998 Years since quittin.7  Smokeless tobacco: Never Used Substance Use Topics  Alcohol use: No  
  Comment: rare  Drug use: No  
 
 
Allergies: 
No Known Allergies Review of Systems Review of Systems Constitutional: Negative for fever. Respiratory: Positive for cough (productive). Cardiovascular: Positive for chest pain (with deep inspiration). Musculoskeletal: Positive for back pain. All other systems reviewed and are negative. Physical Exam  
 
Vitals:  
 19 1328 19 1444 BP: 129/87 120/60 Pulse: 70 68 Resp: 16 18 Temp: 99.7 °F (37.6 °C) 99.4 °F (37.4 °C) SpO2: 100% 99% Weight: 55.8 kg (123 lb) Height: 5' 3\" (1.6 m) Physical Exam  
Constitutional: She is oriented to person, place, and time. She appears well-developed and well-nourished. Well appearing, non-toxic, speaking in full sentences without difficulty HENT:  
Head: Normocephalic and atraumatic. Right Ear: Hearing and tympanic membrane normal.  
Left Ear: Hearing and tympanic membrane normal.  
Nose: Rhinorrhea present. Mouth/Throat: Oropharynx is clear and moist and mucous membranes are normal.  
Eyes: Conjunctivae are normal.  
Neck: Normal range of motion. Neck supple. Cardiovascular: Normal rate and regular rhythm. Pulmonary/Chest: Effort normal and breath sounds normal. No respiratory distress. She has no wheezes. She has no rales. She exhibits tenderness. Mild chest and upper back TTP, no bruising noted Abdominal: Soft. Bowel sounds are normal. There is no tenderness. There is no rebound and no guarding. Musculoskeletal: Normal range of motion. Neurological: She is alert and oriented to person, place, and time. Skin: Skin is warm and dry. Nursing note and vitals reviewed. Diagnostic Study Results Labs - Recent Results (from the past 12 hour(s)) EKG, 12 LEAD, INITIAL Collection Time: 02/24/19  1:41 PM  
Result Value Ref Range Ventricular Rate 65 BPM  
 Atrial Rate 65 BPM  
 P-R Interval 118 ms QRS Duration 102 ms Q-T Interval 408 ms QTC Calculation (Bezet) 424 ms Calculated P Axis 22 degrees Calculated R Axis 46 degrees Calculated T Axis 65 degrees Diagnosis Normal sinus rhythm Incomplete right bundle branch block Borderline ECG When compared with ECG of 01-JAN-2019 14:53, No significant change was found INFLUENZA A & B AG (RAPID TEST) Collection Time: 02/24/19  2:40 PM  
Result Value Ref Range Influenza A Antigen NEGATIVE  NEG Influenza B Antigen NEGATIVE  NEG    
URINALYSIS W/ RFLX MICROSCOPIC Collection Time: 02/24/19  3:00 PM  
Result Value Ref Range Color YELLOW Appearance CLEAR Specific gravity 1.019 1.005 - 1.030    
 pH (UA) 5.0 5.0 - 8.0 Protein NEGATIVE  NEG mg/dL Glucose >1,000 (A) NEG mg/dL Ketone NEGATIVE  NEG mg/dL Bilirubin NEGATIVE  NEG  Blood NEGATIVE  NEG    
 Urobilinogen 0.2 0.2 - 1.0 EU/dL Nitrites NEGATIVE  NEG Leukocyte Esterase NEGATIVE  NEG Radiologic Studies -  
2:10 PM 
RADIOLOGY FINDINGS Chest X-ray shows left lower lobe PNA Pending review by Radiologist 
Recorded by Charli Carmen ED Scribe, as dictated by Rolfe Leventhal FNP-BC 
 
XR CHEST PA LAT Final Result Impression: 1. Streaky basilar atelectasis or scarring without superimposed acute  
radiographic abnormality. CT Results  (Last 48 hours) None CXR Results  (Last 48 hours) 02/24/19 1404  XR CHEST PA LAT Final result Impression:   Impression: 1. Streaky basilar atelectasis or scarring without superimposed acute  
radiographic abnormality. Narrative:  EXAM: XR CHEST PA LAT INDICATION: Cough, upper respiratory tract infection symptoms COMPARISON: Several prior chest radiograph, most recently 1/9/2019. FINDINGS: PA and lateral radiographs of the chest demonstrate linear areas of  
opacity at each lung base without focal pneumonic consolidation, pneumothorax,  
or pleural effusion. Normal cardiac size and mediastinal contours. No acute  
osseous abnormality. Medications given in the ED- Medications - No data to display Medical Decision Making I am the first provider for this patient. I reviewed the vital signs, available nursing notes, past medical history, past surgical history, family history and social history. Vital Signs-Reviewed the patient's vital signs. Pulse Oximetry Analysis - 100% on Room Air Cardiac Monitor: 
Rate: 70 bpm 
Rhythm: NSR 
 
EKG interpretation: (Preliminary) 1:41 PM  
65 BPM, NSR, RBBB, no STEMI, unchanged from prior on 1/1/2019 EKG read by Ashwini Bill MD and Rolfe Leventhal FNP-BC at 1:42 PM  
 
Records Reviewed: Nursing Notes and Old Medical Records Procedures: 
Procedures ED Course: 2:14 PM Initial assessment performed. The patients presenting problems have been discussed, and they are in agreement with the care plan formulated and outlined with them. I have encouraged them to ask questions as they arise throughout their visit. 3:42 PM Pt updated on all results. Understands reasons to return and is offering no further questions or concerns at this time. Pt is agreeable with treatment plan. Discussion: Pt presents for productive cough and pain with coughing. She is well-appearing. Lungs are CTA. She is 99% on RA. CXR, influenza, and urine negative for infection. Will treat symptomatically with cough medication. EKG is unchanged from prior and pain is reproducible with palpation and cough. She understands reasons to return and is agreeable to treatment plan. Diagnosis and Disposition DISCHARGE NOTE: 
3:40 PM 
Gaby Cox's  results have been reviewed with her. She has been counseled regarding her diagnosis, treatment, and plan. She verbally conveys understanding and agreement of the signs, symptoms, diagnosis, treatment and prognosis and additionally agrees to follow up as discussed. She also agrees with the care-plan and conveys that all of her questions have been answered. I have also provided discharge instructions for her that include: educational information regarding their diagnosis and treatment, and list of reasons why they would want to return to the ED prior to their follow-up appointment, should her condition change. She has been provided with education for proper emergency department utilization. CLINICAL IMPRESSION: 
 
1. Cough 2. Glucose found in urine on examination 3. Costochondritis PLAN: 
1. D/C Home 2. Current Discharge Medication List  
  
START taking these medications Details  
benzonatate (TESSALON PERLES) 100 mg capsule Take 1 Cap by mouth three (3) times daily as needed for Cough for up to 7 days. Qty: 30 Cap, Refills: 0 3.  
Follow-up Information Follow up With Specialties Details Why Contact Info Orient Feeling, DO Internal Medicine Schedule an appointment as soon as possible for a visit in 2 days For primary care follow up [address] [place] [address] 
[phone] [facility] Emergency Medicine Go to As needed, if symptoms worsen [address] [address] 
[phone]  
  
 
_______________________________ Attestations: This note is prepared by Héctor Orourke, acting as Scribe for [facility]. [facility]:  The scribe's documentation has been prepared under my direction and personally reviewed by me in its entirety. I confirm that the note above accurately reflects all work, treatment, procedures, and medical decision making performed by me. 
_______________________________

## 2019-03-25 ENCOUNTER — HOSPITAL ENCOUNTER (EMERGENCY)
Age: 69
Discharge: HOME | End: 2019-03-25
Attending: EMERGENCY MEDICINE
Payer: MEDICARE

## 2019-03-25 VITALS
RESPIRATION RATE: 20 BRPM | OXYGEN SATURATION: 100 % | HEART RATE: 65 BPM | DIASTOLIC BLOOD PRESSURE: 55 MMHG | WEIGHT: 128 LBS | TEMPERATURE: 98.9 F | HEIGHT: 63 IN | BODY MASS INDEX: 22.68 KG/M2 | SYSTOLIC BLOOD PRESSURE: 133 MMHG
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
## 2019-03-25 DIAGNOSIS — R51.9 ACUTE NONINTRACTABLE HEADACHE, UNSPECIFIED HEADACHE TYPE: ICD-10-CM

## 2019-03-25 PROCEDURE — 99282 EMERGENCY DEPT VISIT SF MDM: CPT

## 2019-03-25 PROCEDURE — 74011250637 HC RX REV CODE- 250/637: Performed by: NURSE PRACTITIONER

## 2019-03-25 RX ORDER — IBUPROFEN 600 MG/1
600 TABLET ORAL
Status: COMPLETED | OUTPATIENT
Start: 2019-03-25 | End: 2019-03-25

## 2019-03-25 RX ADMIN — IBUPROFEN 600 MG: 600 TABLET, FILM COATED ORAL at 15:04
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
## 2019-05-14 LAB
ATRIAL RATE: 62 BPM
CALCULATED P AXIS, ECG09: 35 DEGREES
CALCULATED R AXIS, ECG10: 67 DEGREES
CALCULATED T AXIS, ECG11: 66 DEGREES
DIAGNOSIS, 93000: NORMAL
P-R INTERVAL, ECG05: 118 MS
Q-T INTERVAL, ECG07: 444 MS
QRS DURATION, ECG06: 110 MS
QTC CALCULATION (BEZET), ECG08: 450 MS
VENTRICULAR RATE, ECG03: 62 BPM

## 2019-08-27 ENCOUNTER — HOSPITAL ENCOUNTER (EMERGENCY)
Dept: CT IMAGING | Age: 69
Discharge: HOME | End: 2019-08-27
Attending: PHYSICIAN ASSISTANT
Payer: MEDICARE

## 2019-08-27 ENCOUNTER — HOSPITAL ENCOUNTER (EMERGENCY)
Age: 69
Discharge: HOME | End: 2019-08-27
Attending: EMERGENCY MEDICINE
Payer: MEDICARE

## 2019-08-27 VITALS
RESPIRATION RATE: 15 BRPM | OXYGEN SATURATION: 100 % | HEIGHT: 63 IN | SYSTOLIC BLOOD PRESSURE: 144 MMHG | TEMPERATURE: 98.8 F | DIASTOLIC BLOOD PRESSURE: 60 MMHG | WEIGHT: 129 LBS | HEART RATE: 62 BPM | BODY MASS INDEX: 22.86 KG/M2

## 2019-08-27 DIAGNOSIS — R42 VERTIGO: Primary | ICD-10-CM

## 2019-08-27 DIAGNOSIS — Z86.69 HISTORY OF MENIERE'S DISEASE: ICD-10-CM

## 2019-08-27 LAB
ALBUMIN SERPL-MCNC: 3.8 G/DL (ref 3.4–5)
ALBUMIN/GLOB SERPL: 0.9 {RATIO} (ref 0.8–1.7)
ALP SERPL-CCNC: 91 U/L (ref 45–117)
ALT SERPL-CCNC: 22 U/L (ref 13–56)
ANION GAP SERPL CALC-SCNC: 3 MMOL/L (ref 3–18)
APPEARANCE UR: CLEAR
AST SERPL-CCNC: 12 U/L (ref 10–38)
ATRIAL RATE: 59 BPM
BASOPHILS # BLD: 0 K/UL (ref 0–0.1)
BASOPHILS NFR BLD: 0 % (ref 0–2)
BILIRUB SERPL-MCNC: 0.4 MG/DL (ref 0.2–1)
BILIRUB UR QL: NEGATIVE
BUN SERPL-MCNC: 8 MG/DL (ref 7–18)
BUN/CREAT SERPL: 9 (ref 12–20)
CALCIUM SERPL-MCNC: 9.3 MG/DL (ref 8.5–10.1)
CALCULATED P AXIS, ECG09: 25 DEGREES
CALCULATED R AXIS, ECG10: 35 DEGREES
CALCULATED T AXIS, ECG11: 69 DEGREES
CHLORIDE SERPL-SCNC: 103 MMOL/L (ref 100–111)
CK MB CFR SERPL CALC: NORMAL % (ref 0–4)
CK MB SERPL-MCNC: <1 NG/ML (ref 5–25)
CK SERPL-CCNC: 97 U/L (ref 26–192)
CO2 SERPL-SCNC: 34 MMOL/L (ref 21–32)
COLOR UR: YELLOW
CREAT SERPL-MCNC: 0.87 MG/DL (ref 0.6–1.3)
DIAGNOSIS, 93000: NORMAL
DIFFERENTIAL METHOD BLD: NORMAL
EOSINOPHIL # BLD: 0.2 K/UL (ref 0–0.4)
EOSINOPHIL NFR BLD: 2 % (ref 0–5)
ERYTHROCYTE [DISTWIDTH] IN BLOOD BY AUTOMATED COUNT: 13.8 % (ref 11.6–14.5)
GLOBULIN SER CALC-MCNC: 4.3 G/DL (ref 2–4)
GLUCOSE SERPL-MCNC: 89 MG/DL (ref 74–99)
GLUCOSE UR STRIP.AUTO-MCNC: NEGATIVE MG/DL
HCT VFR BLD AUTO: 37.4 % (ref 35–45)
HGB BLD-MCNC: 12.4 G/DL (ref 12–16)
HGB UR QL STRIP: NEGATIVE
KETONES UR QL STRIP.AUTO: NEGATIVE MG/DL
LEUKOCYTE ESTERASE UR QL STRIP.AUTO: NEGATIVE
LYMPHOCYTES # BLD: 2.2 K/UL (ref 0.9–3.6)
LYMPHOCYTES NFR BLD: 25 % (ref 21–52)
MCH RBC QN AUTO: 29.5 PG (ref 24–34)
MCHC RBC AUTO-ENTMCNC: 33.2 G/DL (ref 31–37)
MCV RBC AUTO: 88.8 FL (ref 74–97)
MONOCYTES # BLD: 0.9 K/UL (ref 0.05–1.2)
MONOCYTES NFR BLD: 10 % (ref 3–10)
NEUTS SEG # BLD: 5.5 K/UL (ref 1.8–8)
NEUTS SEG NFR BLD: 63 % (ref 40–73)
NITRITE UR QL STRIP.AUTO: NEGATIVE
P-R INTERVAL, ECG05: 124 MS
PH UR STRIP: 5 [PH] (ref 5–8)
PLATELET # BLD AUTO: 236 K/UL (ref 135–420)
PMV BLD AUTO: 9.6 FL (ref 9.2–11.8)
POTASSIUM SERPL-SCNC: 3.5 MMOL/L (ref 3.5–5.5)
PROT SERPL-MCNC: 8.1 G/DL (ref 6.4–8.2)
PROT UR STRIP-MCNC: NEGATIVE MG/DL
Q-T INTERVAL, ECG07: 430 MS
QRS DURATION, ECG06: 110 MS
QTC CALCULATION (BEZET), ECG08: 425 MS
RBC # BLD AUTO: 4.21 M/UL (ref 4.2–5.3)
SODIUM SERPL-SCNC: 140 MMOL/L (ref 136–145)
SP GR UR REFRACTOMETRY: 1.01 (ref 1–1.03)
TROPONIN I SERPL-MCNC: <0.02 NG/ML (ref 0–0.04)
UROBILINOGEN UR QL STRIP.AUTO: 0.2 EU/DL (ref 0.2–1)
VENTRICULAR RATE, ECG03: 59 BPM
WBC # BLD AUTO: 8.8 K/UL (ref 4.6–13.2)

## 2019-08-27 PROCEDURE — 70450 CT HEAD/BRAIN W/O DYE: CPT

## 2019-08-27 PROCEDURE — 81003 URINALYSIS AUTO W/O SCOPE: CPT

## 2019-08-27 PROCEDURE — 85025 COMPLETE CBC W/AUTO DIFF WBC: CPT

## 2019-08-27 PROCEDURE — 80053 COMPREHEN METABOLIC PANEL: CPT

## 2019-08-27 PROCEDURE — 74011250636 HC RX REV CODE- 250/636: Performed by: PHYSICIAN ASSISTANT

## 2019-08-27 PROCEDURE — 82550 ASSAY OF CK (CPK): CPT

## 2019-08-27 PROCEDURE — 93005 ELECTROCARDIOGRAM TRACING: CPT

## 2019-08-27 PROCEDURE — 99285 EMERGENCY DEPT VISIT HI MDM: CPT

## 2019-08-27 RX ORDER — MECLIZINE HYDROCHLORIDE 25 MG/1
25 TABLET ORAL
Qty: 21 TAB | Refills: 0 | Status: SHIPPED | OUTPATIENT
Start: 2019-08-27 | End: 2019-09-03

## 2019-08-27 RX ORDER — MECLIZINE HCL 12.5 MG 12.5 MG/1
25 TABLET ORAL
Status: COMPLETED | OUTPATIENT
Start: 2019-08-27 | End: 2019-08-27

## 2019-08-27 RX ADMIN — MECLIZINE 25 MG: 12.5 TABLET ORAL at 14:57

## 2019-08-27 RX ADMIN — SODIUM CHLORIDE 1000 ML: 900 INJECTION, SOLUTION INTRAVENOUS at 14:56

## 2019-08-27 NOTE — ED PROVIDER NOTES
EMERGENCY DEPARTMENT HISTORY AND PHYSICAL EXAM    Date: 8/27/2019  Patient Name: Chelsie Kaur    History of Presenting Illness     Chief Complaint   Patient presents with    Dizziness         History Provided By: Patient    Chief Complaint: dizziness    HPI(Context):   2:39 PM  Chelsie Kaur is a 76 y.o. female with PMHX of vertigo, metabolic syndrome, renal stones who presents to the emergency department C/O dizziness. Associated sxs include 5/10 left temporal HA. Sxs x last night. Sxs worse with moving head back and forth or laying flat. Pt has hx of vertigo. Sxs feels same today as she describes room spinning sensation. Pt takes meclizine for sxs but has run out of medication. Pt denies fever, chills, visual disturbance, vomiting, N/W/T, neck pain, and any other sxs or complaints. PCP: Jonathan Calzada MD    Current Outpatient Medications   Medication Sig Dispense Refill    meclizine (ANTIVERT) 25 mg tablet Take 1 Tab by mouth three (3) times daily as needed (as needed for dizziness or vertigo) for up to 7 days. 21 Tab 0    insulin aspart U-100 (NOVOLOG FLEXPEN U-100 INSULIN) 100 unit/mL inpn 12 Units by SubCUTAneous route Before breakfast, lunch, and dinner.  insulin detemir (LEVEMIR) 100 unit/mL injection 0.15 mL by SubCUTAneous route nightly. 1 Vial 0    levothyroxine (SYNTHROID) 100 mcg tablet Take 100 mcg by mouth Daily (before breakfast). Instructed to take morning of surgery with sip of water.  simvastatin (ZOCOR) 40 mg tablet Take 40 mg by mouth nightly.  enalapril (VASOTEC) 20 mg tablet Take 20 mg by mouth daily. Instructed to take morning of surgery with sip of water      albuterol (PROVENTIL HFA, VENTOLIN HFA, PROAIR HFA) 90 mcg/actuation inhaler Take 2 Puffs by inhalation every four (4) hours as needed for Wheezing. 1 Inhaler 0    inhalational spacing device (E-Z SPACER) 1 Each by Does Not Apply route as needed.  1 Device 0       Past History     Past Medical History:  Past Medical History:   Diagnosis Date    Chronic pain     low back pain    Diabetes ([facility] [address]     type II, was on oral agents for a while    Hypercholesteremia     Hypertension     Ill-defined condition     kidny stones    Thyroid disease        Past Surgical History:  Past Surgical History:   Procedure Laterality Date    HX GYN  1977    tubal ligation    HX LITHOTRIPSY         Family History:  Family History   Problem Relation Age of Onset    Cancer Mother        Social History:  Social History     Tobacco Use    Smoking status: Former Smoker     Last attempt to quit: 1998     Years since quittin.2    Smokeless tobacco: Never Used   Substance Use Topics    Alcohol use: No     Comment: rare    Drug use: No       Allergies:  No Known Allergies      Review of Systems   Review of Systems   Constitutional: Negative for chills and fever. Eyes: Negative for visual disturbance. Gastrointestinal: Positive for nausea. Negative for vomiting. Genitourinary: Negative for dysuria. Musculoskeletal: Negative for neck pain. Neurological: Positive for dizziness and headaches. Negative for weakness, light-headedness and numbness. All other systems reviewed and are negative. Physical Exam     Vitals:    19 1500 19 1513 19 1530 19 1630   BP: 151/65  128/57 144/60   Pulse: 60  64 62   Resp: 17  20 15   Temp:       SpO2:  100%     Weight:       Height:         Physical Exam   Constitutional: She is oriented to person, place, and time. She appears well-developed and well-nourished. No distress. AA female in NAD. Alert. Appears comfortable. Answering questions. Following directions. HENT:   Head: Normocephalic and atraumatic. Right Ear: External ear normal. No swelling or tenderness. Tympanic membrane is not perforated, not erythematous and not bulging. No hemotympanum. Left Ear: External ear normal. No swelling or tenderness.  Tympanic membrane is not perforated, not erythematous and not bulging. No hemotympanum. Nose: Nose normal. No mucosal edema or rhinorrhea. Right sinus exhibits no maxillary sinus tenderness and no frontal sinus tenderness. Left sinus exhibits no maxillary sinus tenderness and no frontal sinus tenderness. Mouth/Throat: Uvula is midline, oropharynx is clear and moist and mucous membranes are normal. No oral lesions. No trismus in the jaw. No dental abscesses or uvula swelling. No oropharyngeal exudate, posterior oropharyngeal edema, posterior oropharyngeal erythema or tonsillar abscesses. Eyes: Pupils are equal, round, and reactive to light. Conjunctivae are normal. Right eye exhibits no discharge. Left eye exhibits no discharge. No scleral icterus. Right eye exhibits nystagmus (mild). Left eye exhibits nystagmus (mild). Neck: Normal range of motion. Neck supple. Cardiovascular: Normal rate, regular rhythm, normal heart sounds and intact distal pulses. Exam reveals no gallop and no friction rub. No murmur heard. Pulmonary/Chest: Effort normal and breath sounds normal. No accessory muscle usage. No tachypnea. No respiratory distress. She has no decreased breath sounds. She has no wheezes. She has no rhonchi. She has no rales. Musculoskeletal: Normal range of motion. She exhibits no edema or tenderness. Neurological: She is alert and oriented to person, place, and time. She has normal strength. No cranial nerve deficit or sensory deficit. Coordination normal. GCS eye subscore is 4. GCS verbal subscore is 5. GCS motor subscore is 6. Normal finger to nose  Neg pronator   Skin: Skin is warm and dry. No rash noted. She is not diaphoretic. No erythema. Psychiatric: She has a normal mood and affect. Judgment normal.   Nursing note and vitals reviewed. Diagnostic Study Results     Labs -   No results found for this or any previous visit (from the past 12 hour(s)).         CT HEAD WO CONT   Final Result   IMPRESSION:      No acute intracranial hemorrhage, mass effect, midline shift, or herniation. No   definite CT evidence of acute cortical infarct is seen. Please note that   noncontrast head CT may be normal in early acute infarct. CT Results  (Last 48 hours)               08/27/19 1611  CT HEAD WO CONT Final result    Impression:  IMPRESSION:       No acute intracranial hemorrhage, mass effect, midline shift, or herniation. No   definite CT evidence of acute cortical infarct is seen. Please note that   noncontrast head CT may be normal in early acute infarct. Narrative:  EXAM: CT head       INDICATION: Headache, dizziness. COMPARISON: None. TECHNIQUE: Axial CT imaging of the head was performed without intravenous   contrast. Coronal and sagittal reconstructions were obtained. Dose reduction: One or more dose reduction techniques were used on this CT:   automated exposure control, adjustment of the mAs and/or kVp according to   patient's size, and iterative reconstruction techniques. The specific techniques   utilized on this CT exam have been documented in the patient's electronic   medical record.   _______________       FINDINGS:       BRAIN PARENCHYMA: There is no evidence of acute intracranial hemorrhage, mass   effect, midline shift, or herniation. No definite CT evidence of acute cortical   infarct is seen. The gray-white matter differentiation is within normal limits. VENTRICLES/EXTRA-AXIAL SPACES/MENINGES: The ventricles and sulci are normal in   their size and configuration. OSSEOUS STRUCTURES: No fracture is seen. PARANASAL SINUSES/MASTOIDS: Visualized paranasal sinuses and mastoid air cells   are clear. ORBITS: The visualized orbits are unremarkable.        OTHER: None.         _______________               CXR Results  (Last 48 hours)    None          Medications given in the ED-  Medications   sodium chloride 0.9 % bolus infusion 1,000 mL (0 mL IntraVENous IV Completed 8/27/19 1700)   meclizine (ANTIVERT) tablet 25 mg (25 mg Oral Given 8/27/19 5497)         Medical Decision Making   I am the first provider for this patient. I reviewed the vital signs, available nursing notes, past medical history, past surgical history, family history and social history. Vital Signs-Reviewed the patient's vital signs. Pulse Oximetry Analysis - 100% on RA      Records Reviewed: Nursing Notes    Provider Notes (Medical Decision Making): vertigo, dehydration, metabolic derangement, meniere's, labyrinthitis, hypoglycemia, DKA/HHS, intracranial bleed, complex migraine. Doubt ACS/MI or CVA/TIA  Procedures:  Procedures    ED Course:   2:39 PM Initial assessment performed. The patients presenting problems have been discussed, and they are in agreement with the care plan formulated and outlined with them. I have encouraged them to ask questions as they arise throughout their visit. Diagnosis and Disposition       4:34 PM  Feels better with dizziness nearly resolved. Will ambulate and d/c home. Suspect peripheral etiology. Responsive to meclizine. CT HEAD negative. Doubt CVA/TIA. Doubt ACS/MI or PE. Reasons to RTED discussed with pt. All questions answered. Pt feels comfortable going home at this time. Pt expressed understanding and she agrees with plan. 1. Vertigo    2. History of Meniere's disease        PLAN:  1. D/C Home  2. Discharge Medication List as of 8/27/2019  4:34 PM      CONTINUE these medications which have CHANGED    Details   meclizine (ANTIVERT) 25 mg tablet Take 1 Tab by mouth three (3) times daily as needed (as needed for dizziness or vertigo) for up to 7 days. , Normal, Disp-21 Tab, R-0         CONTINUE these medications which have NOT CHANGED    Details   insulin aspart U-100 (NOVOLOG FLEXPEN U-100 INSULIN) 100 unit/mL inpn 12 Units by SubCUTAneous route Before breakfast, lunch, and dinner., Historical Med      insulin detemir (LEVEMIR) 100 unit/mL injection 0.15 mL by SubCUTAneous route nightly., Normal, Disp-1 Vial, R-0      levothyroxine (SYNTHROID) 100 mcg tablet Take 100 mcg by mouth Daily (before breakfast). Instructed to take morning of surgery with sip of water., Historical Med      simvastatin (ZOCOR) 40 mg tablet Take 40 mg by mouth nightly. Historical Med, 40 mg      enalapril (VASOTEC) 20 mg tablet Take 20 mg by mouth daily. Instructed to take morning of surgery with sip of water, Historical Med      albuterol (PROVENTIL HFA, VENTOLIN HFA, PROAIR HFA) 90 mcg/actuation inhaler Take 2 Puffs by inhalation every four (4) hours as needed for Wheezing., Normal, Disp-1 Inhaler, R-0      inhalational spacing device (E-Z SPACER) 1 Each by Does Not Apply route as needed., Normal, Disp-1 Device, R-0           3. Follow-up Information     Follow up With Specialties Details Why Contact Info    Ierne Hughes MD Otolaryngology, Surgery   [facility] [place] [address]      Aniya Rome MD Neurology   [address] [place] [id number] [phone]      [facility] EMERGENCY DEPT Emergency Medicine  As needed, If symptoms worsen [address] [id number] [phone]        _______________________________    Attestations: This note is prepared by Ella Zepeda PA-C.  _______________________________        Please note that this dictation was completed with [employer], the computer voice recognition software. Quite often unanticipated grammatical, syntax, homophones, and other interpretive errors are inadvertently transcribed by the computer software. Please disregard these errors. Please excuse any errors that have escaped final proofreading.

## 2021-05-11 ENCOUNTER — HOSPITAL ENCOUNTER (EMERGENCY)
Age: 71
Discharge: HOME | End: 2021-05-11
Attending: EMERGENCY MEDICINE
Payer: MEDICARE

## 2021-05-11 ENCOUNTER — APPOINTMENT (OUTPATIENT)
Dept: CT IMAGING | Age: 71
End: 2021-05-11
Attending: PHYSICIAN ASSISTANT
Payer: MEDICARE

## 2021-05-11 ENCOUNTER — APPOINTMENT (OUTPATIENT)
Dept: GENERAL RADIOLOGY | Age: 71
End: 2021-05-11
Attending: PHYSICIAN ASSISTANT
Payer: MEDICARE

## 2021-05-11 VITALS
WEIGHT: 130 LBS | BODY MASS INDEX: 23.04 KG/M2 | HEART RATE: 66 BPM | DIASTOLIC BLOOD PRESSURE: 61 MMHG | TEMPERATURE: 98.4 F | RESPIRATION RATE: 18 BRPM | SYSTOLIC BLOOD PRESSURE: 150 MMHG | OXYGEN SATURATION: 100 % | HEIGHT: 63 IN

## 2021-05-11 DIAGNOSIS — K20.90 ESOPHAGITIS: ICD-10-CM

## 2021-05-11 DIAGNOSIS — E16.2 HYPOGLYCEMIA: ICD-10-CM

## 2021-05-11 DIAGNOSIS — R07.89 ATYPICAL CHEST PAIN: Primary | ICD-10-CM

## 2021-05-11 DIAGNOSIS — E87.6 HYPOKALEMIA: ICD-10-CM

## 2021-05-11 DIAGNOSIS — E04.9 THYROID GOITER: ICD-10-CM

## 2021-05-11 LAB
ALBUMIN SERPL-MCNC: 3.6 G/DL (ref 3.4–5)
ALBUMIN/GLOB SERPL: 0.9 {RATIO} (ref 0.8–1.7)
ALP SERPL-CCNC: 71 U/L (ref 45–117)
ALT SERPL-CCNC: 15 U/L (ref 13–56)
ANION GAP SERPL CALC-SCNC: 6 MMOL/L (ref 3–18)
APPEARANCE UR: CLEAR
APTT PPP: 32.3 SEC (ref 23–36.4)
AST SERPL-CCNC: 12 U/L (ref 10–38)
ATRIAL RATE: 60 BPM
BASOPHILS # BLD: 0 K/UL (ref 0–0.1)
BASOPHILS NFR BLD: 1 % (ref 0–2)
BILIRUB SERPL-MCNC: 0.3 MG/DL (ref 0.2–1)
BILIRUB UR QL: NEGATIVE
BNP SERPL-MCNC: 164 PG/ML (ref 0–900)
BUN SERPL-MCNC: 13 MG/DL (ref 7–18)
BUN/CREAT SERPL: 14 (ref 12–20)
CALCIUM SERPL-MCNC: 9.4 MG/DL (ref 8.5–10.1)
CALCULATED P AXIS, ECG09: 57 DEGREES
CALCULATED R AXIS, ECG10: 24 DEGREES
CALCULATED T AXIS, ECG11: 51 DEGREES
CHLORIDE SERPL-SCNC: 101 MMOL/L (ref 100–111)
CK MB CFR SERPL CALC: NORMAL % (ref 0–4)
CK MB CFR SERPL CALC: NORMAL % (ref 0–4)
CK MB SERPL-MCNC: <1 NG/ML (ref 5–25)
CK MB SERPL-MCNC: <1 NG/ML (ref 5–25)
CK SERPL-CCNC: 69 U/L (ref 26–192)
CK SERPL-CCNC: 75 U/L (ref 26–192)
CO2 SERPL-SCNC: 32 MMOL/L (ref 21–32)
COLOR UR: YELLOW
CREAT SERPL-MCNC: 0.9 MG/DL (ref 0.6–1.3)
D DIMER PPP FEU-MCNC: 0.83 UG/ML(FEU)
DIAGNOSIS, 93000: NORMAL
DIFFERENTIAL METHOD BLD: ABNORMAL
EOSINOPHIL # BLD: 0.1 K/UL (ref 0–0.4)
EOSINOPHIL NFR BLD: 1 % (ref 0–5)
ERYTHROCYTE [DISTWIDTH] IN BLOOD BY AUTOMATED COUNT: 12.1 % (ref 11.6–14.5)
GLOBULIN SER CALC-MCNC: 4.2 G/DL (ref 2–4)
GLUCOSE BLD STRIP.AUTO-MCNC: 136 MG/DL (ref 70–110)
GLUCOSE BLD STRIP.AUTO-MCNC: 39 MG/DL (ref 70–110)
GLUCOSE BLD STRIP.AUTO-MCNC: 75 MG/DL (ref 70–110)
GLUCOSE SERPL-MCNC: 43 MG/DL (ref 74–99)
GLUCOSE UR STRIP.AUTO-MCNC: NEGATIVE MG/DL
HCT VFR BLD AUTO: 35.6 % (ref 35–45)
HGB BLD-MCNC: 11.9 G/DL (ref 12–16)
HGB UR QL STRIP: NEGATIVE
INR PPP: 1.3 (ref 0.8–1.2)
KETONES UR QL STRIP.AUTO: NEGATIVE MG/DL
LEUKOCYTE ESTERASE UR QL STRIP.AUTO: NEGATIVE
LIPASE SERPL-CCNC: 148 U/L (ref 73–393)
LYMPHOCYTES # BLD: 1.9 K/UL (ref 0.9–3.6)
LYMPHOCYTES NFR BLD: 26 % (ref 21–52)
MCH RBC QN AUTO: 29.2 PG (ref 24–34)
MCHC RBC AUTO-ENTMCNC: 33.4 G/DL (ref 31–37)
MCV RBC AUTO: 87.5 FL (ref 74–97)
MONOCYTES # BLD: 1.1 K/UL (ref 0.05–1.2)
MONOCYTES NFR BLD: 14 % (ref 3–10)
NEUTS SEG # BLD: 4.3 K/UL (ref 1.8–8)
NEUTS SEG NFR BLD: 58 % (ref 40–73)
NITRITE UR QL STRIP.AUTO: NEGATIVE
P-R INTERVAL, ECG05: 134 MS
PH UR STRIP: 6 [PH] (ref 5–8)
PLATELET # BLD AUTO: 276 K/UL (ref 135–420)
PMV BLD AUTO: 9.7 FL (ref 9.2–11.8)
POTASSIUM SERPL-SCNC: 2.9 MMOL/L (ref 3.5–5.5)
PROT SERPL-MCNC: 7.8 G/DL (ref 6.4–8.2)
PROT UR STRIP-MCNC: NEGATIVE MG/DL
PROTHROMBIN TIME: 15.9 SEC (ref 11.5–15.2)
Q-T INTERVAL, ECG07: 436 MS
QRS DURATION, ECG06: 110 MS
QTC CALCULATION (BEZET), ECG08: 436 MS
RBC # BLD AUTO: 4.07 M/UL (ref 4.2–5.3)
SODIUM SERPL-SCNC: 139 MMOL/L (ref 136–145)
SP GR UR REFRACTOMETRY: 1.02 (ref 1–1.03)
TROPONIN I SERPL-MCNC: 0.02 NG/ML (ref 0–0.04)
TROPONIN I SERPL-MCNC: 0.03 NG/ML (ref 0–0.04)
UROBILINOGEN UR QL STRIP.AUTO: 0.2 EU/DL (ref 0.2–1)
VENTRICULAR RATE, ECG03: 60 BPM
WBC # BLD AUTO: 7.4 K/UL (ref 4.6–13.2)

## 2021-05-11 PROCEDURE — 99285 EMERGENCY DEPT VISIT HI MDM: CPT

## 2021-05-11 PROCEDURE — 85379 FIBRIN DEGRADATION QUANT: CPT

## 2021-05-11 PROCEDURE — 85610 PROTHROMBIN TIME: CPT

## 2021-05-11 PROCEDURE — 82553 CREATINE MB FRACTION: CPT

## 2021-05-11 PROCEDURE — 96365 THER/PROPH/DIAG IV INF INIT: CPT

## 2021-05-11 PROCEDURE — 74011000636 HC RX REV CODE- 636: Performed by: EMERGENCY MEDICINE

## 2021-05-11 PROCEDURE — 93005 ELECTROCARDIOGRAM TRACING: CPT

## 2021-05-11 PROCEDURE — 83690 ASSAY OF LIPASE: CPT

## 2021-05-11 PROCEDURE — 82962 GLUCOSE BLOOD TEST: CPT

## 2021-05-11 PROCEDURE — 85025 COMPLETE CBC W/AUTO DIFF WBC: CPT

## 2021-05-11 PROCEDURE — 74011250636 HC RX REV CODE- 250/636: Performed by: PHYSICIAN ASSISTANT

## 2021-05-11 PROCEDURE — 83880 ASSAY OF NATRIURETIC PEPTIDE: CPT

## 2021-05-11 PROCEDURE — 74011250637 HC RX REV CODE- 250/637: Performed by: PHYSICIAN ASSISTANT

## 2021-05-11 PROCEDURE — 74011000250 HC RX REV CODE- 250: Performed by: PHYSICIAN ASSISTANT

## 2021-05-11 PROCEDURE — 96367 TX/PROPH/DG ADDL SEQ IV INF: CPT

## 2021-05-11 PROCEDURE — 71275 CT ANGIOGRAPHY CHEST: CPT

## 2021-05-11 PROCEDURE — 80053 COMPREHEN METABOLIC PANEL: CPT

## 2021-05-11 PROCEDURE — 81003 URINALYSIS AUTO W/O SCOPE: CPT

## 2021-05-11 PROCEDURE — 71045 X-RAY EXAM CHEST 1 VIEW: CPT

## 2021-05-11 PROCEDURE — 85730 THROMBOPLASTIN TIME PARTIAL: CPT

## 2021-05-11 PROCEDURE — 96375 TX/PRO/DX INJ NEW DRUG ADDON: CPT

## 2021-05-11 RX ORDER — DEXTROSE MONOHYDRATE 100 MG/ML
125-250 INJECTION, SOLUTION INTRAVENOUS ONCE
Status: COMPLETED | OUTPATIENT
Start: 2021-05-11 | End: 2021-05-11

## 2021-05-11 RX ORDER — ACETAMINOPHEN 500 MG
1000 TABLET ORAL
Status: COMPLETED | OUTPATIENT
Start: 2021-05-11 | End: 2021-05-11

## 2021-05-11 RX ORDER — ASPIRIN 325 MG
325 TABLET ORAL
Status: COMPLETED | OUTPATIENT
Start: 2021-05-11 | End: 2021-05-11

## 2021-05-11 RX ORDER — ONDANSETRON 2 MG/ML
4 INJECTION INTRAMUSCULAR; INTRAVENOUS
Status: COMPLETED | OUTPATIENT
Start: 2021-05-11 | End: 2021-05-11

## 2021-05-11 RX ORDER — POTASSIUM CHLORIDE 7.45 MG/ML
10 INJECTION INTRAVENOUS
Status: COMPLETED | OUTPATIENT
Start: 2021-05-11 | End: 2021-05-11

## 2021-05-11 RX ORDER — FAMOTIDINE 10 MG/ML
20 INJECTION INTRAVENOUS
Status: COMPLETED | OUTPATIENT
Start: 2021-05-11 | End: 2021-05-11

## 2021-05-11 RX ADMIN — POTASSIUM BICARBONATE 40 MEQ: 782 TABLET, EFFERVESCENT ORAL at 16:09

## 2021-05-11 RX ADMIN — ACETAMINOPHEN 1000 MG: 500 TABLET ORAL at 13:19

## 2021-05-11 RX ADMIN — ONDANSETRON 4 MG: 2 INJECTION INTRAMUSCULAR; INTRAVENOUS at 17:48

## 2021-05-11 RX ADMIN — IOPAMIDOL 100 ML: 755 INJECTION, SOLUTION INTRAVENOUS at 14:38

## 2021-05-11 RX ADMIN — FAMOTIDINE 20 MG: 10 INJECTION, SOLUTION INTRAVENOUS at 13:19

## 2021-05-11 RX ADMIN — ASPIRIN 325 MG ORAL TABLET 325 MG: 325 PILL ORAL at 13:19

## 2021-05-11 RX ADMIN — DEXTROSE MONOHYDRATE 250 ML: 100 INJECTION, SOLUTION INTRAVENOUS at 13:28

## 2021-05-11 RX ADMIN — POTASSIUM CHLORIDE 10 MEQ: 10 INJECTION, SOLUTION INTRAVENOUS at 14:56

## 2021-05-11 NOTE — ED TRIAGE NOTES
Pt states L sided chest pain that radiates to her back on and off for several weeks.  Pt states pain is worse today

## 2021-05-11 NOTE — ED PROVIDER NOTES
EMERGENCY DEPARTMENT HISTORY AND PHYSICAL EXAM    Date: 5/11/2021  Patient Name: Soto Fish    History of Presenting Illness     Chief Complaint   Patient presents with    Chest Pain         History Provided By: Patient    Chief Complaint: chest pain    HPI(Context):   12:53 PM  Minor Phi is a 79 y.o. female with PMHX of metabolic syndrome, GERD, renal stones, thyroid disease,  who presents to the emergency department C/O chest pain. Sxs intermittent x several days. Today at 0600 pain returned. Diffuse to chest. Worse substernal. Radiates to back at times. Pain began after eating oatmeal this AM. Pt feels \"a little\"   short of breath when asked. Pt took insulin last night and this AM as prescribed. Pt has not eaten lunch. Pt was recently started on protonix for GERD and has an appt to see GI tomorrow [facility] in [place] gastroenterology). Pt has no known cardiac hx. No prior cardiac testing. No FmHx of MI. Pt is nonsmoker. No ETOH. No illicit drug use Pt denies fever, chills, nausea, vomiting, diaphoresis, hx of ACS/MI, hx of DVT/PE, and any other sxs or complaints. PCP: Dr. Jeanie Irwin    Current Facility-Administered Medications   Medication Dose Route Frequency Provider Last Rate Last Admin    lactated ringers bolus infusion 1,000 mL  1,000 mL IntraVENous ONCE Ginette Quintero PA-C   Stopped at 05/11/21 1319     Current Outpatient Medications   Medication Sig Dispense Refill    potassium bicarbonate (Effer-K) 25 mEq tablet Take 1 Tab by mouth two (2) times a day for 7 days. Indications: low amount of potassium in the blood 14 Tab 0    insulin aspart U-100 (NOVOLOG FLEXPEN U-100 INSULIN) 100 unit/mL inpn 12 Units by SubCUTAneous route Before breakfast, lunch, and dinner.  albuterol (PROVENTIL HFA, VENTOLIN HFA, PROAIR HFA) 90 mcg/actuation inhaler Take 2 Puffs by inhalation every four (4) hours as needed for Wheezing.  1 Inhaler 0    inhalational spacing device (E-Z SPACER) 1 Each by Does Not Apply route as needed. 1 Device 0    insulin detemir (LEVEMIR) 100 unit/mL injection 0.15 mL by SubCUTAneous route nightly. 1 Vial 0    levothyroxine (SYNTHROID) 100 mcg tablet Take 100 mcg by mouth Daily (before breakfast). Instructed to take morning of surgery with sip of water.  simvastatin (ZOCOR) 40 mg tablet Take 40 mg by mouth nightly.  enalapril (VASOTEC) 20 mg tablet Take 20 mg by mouth daily. Instructed to take morning of surgery with sip of water         Past History     Past Medical History:  Past Medical History:   Diagnosis Date    Chronic pain     low back pain    Diabetes ([facility] [address]     type II, was on oral agents for a while    Hypercholesteremia     Hypertension     Ill-defined condition     kidny stones    Thyroid disease        Past Surgical History:  Past Surgical History:   Procedure Laterality Date    HX GYN  1977    tubal ligation    HX LITHOTRIPSY         Family History:  Family History   Problem Relation Age of Onset    Cancer Mother        Social History:  Social History     Tobacco Use    Smoking status: Former Smoker     Quit date: 1998     Years since quittin.9    Smokeless tobacco: Never Used   Substance Use Topics    Alcohol use: No     Comment: rare    Drug use: No       Allergies:  No Known Allergies      Review of Systems   Review of Systems   Constitutional: Negative for chills, diaphoresis and fever. Respiratory: Positive for shortness of breath (\"a little bit\"). Negative for cough. Cardiovascular: Positive for chest pain. Gastrointestinal: Negative for abdominal pain, nausea and vomiting. Musculoskeletal: Positive for back pain (upper back). Neurological: Negative for syncope and light-headedness. All other systems reviewed and are negative.       Physical Exam     Vitals:    21 1530 21 1630 21 1700 21 1730   BP: 132/62 120/71 (!) 134/57 (!) 150/61   Pulse: 70 61 64 66   Resp: 16 18 19 18   Temp:       SpO2: 98% 100% 100% 100%   Weight:       Height:         Physical Exam  Vitals signs and nursing note reviewed. Constitutional:       General: She is not in acute distress. Appearance: She is well-developed. She is not diaphoretic. Comments: Geriatric  female in NAD. Alert. Appears comfortable. Daughter at bedside. HENT:      Head: Normocephalic and atraumatic. Right Ear: External ear normal.      Left Ear: External ear normal.      Nose: Nose normal.   Eyes:      General: No scleral icterus. Right eye: No discharge. Left eye: No discharge. Conjunctiva/sclera: Conjunctivae normal.   Neck:      Musculoskeletal: Normal range of motion. Cardiovascular:      Rate and Rhythm: Normal rate and regular rhythm. Heart sounds: Normal heart sounds. No murmur. No friction rub. No gallop. Pulmonary:      Effort: Pulmonary effort is normal. No tachypnea, accessory muscle usage or respiratory distress. Breath sounds: Normal breath sounds. No decreased breath sounds, wheezing, rhonchi or rales. Abdominal:      General: There is no distension. Palpations: Abdomen is soft. Tenderness: There is no abdominal tenderness. There is no right CVA tenderness, left CVA tenderness, guarding or rebound. Negative signs include McBurney's sign. Musculoskeletal: Normal range of motion. Right lower leg: No edema. Left lower leg: No edema. Skin:     General: Skin is warm and dry. Findings: No erythema or rash. Neurological:      Mental Status: She is alert and oriented to person, place, and time.    Psychiatric:         Behavior: Behavior normal.         Judgment: Judgment normal.             Diagnostic Study Results     Labs -     Recent Results (from the past 12 hour(s))   CBC WITH AUTOMATED DIFF    Collection Time: 05/11/21  1:08 PM   Result Value Ref Range    WBC 7.4 4.6 - 13.2 K/uL    RBC 4.07 (L) 4.20 - 5.30 M/uL    HGB 11.9 (L) 12.0 - 16.0 g/dL    HCT 35.6 35.0 - 45.0 %    MCV 87.5 74.0 - 97.0 FL    MCH 29.2 24.0 - 34.0 PG    MCHC 33.4 31.0 - 37.0 g/dL    RDW 12.1 11.6 - 14.5 %    PLATELET 086 094 - 036 K/uL    MPV 9.7 9.2 - 11.8 FL    NEUTROPHILS 58 40 - 73 %    LYMPHOCYTES 26 21 - 52 %    MONOCYTES 14 (H) 3 - 10 %    EOSINOPHILS 1 0 - 5 %    BASOPHILS 1 0 - 2 %    ABS. NEUTROPHILS 4.3 1.8 - 8.0 K/UL    ABS. LYMPHOCYTES 1.9 0.9 - 3.6 K/UL    ABS. MONOCYTES 1.1 0.05 - 1.2 K/UL    ABS. EOSINOPHILS 0.1 0.0 - 0.4 K/UL    ABS. BASOPHILS 0.0 0.0 - 0.1 K/UL    DF AUTOMATED     METABOLIC PANEL, COMPREHENSIVE    Collection Time: 05/11/21  1:08 PM   Result Value Ref Range    Sodium 139 136 - 145 mmol/L    Potassium 2.9 (LL) 3.5 - 5.5 mmol/L    Chloride 101 100 - 111 mmol/L    CO2 32 21 - 32 mmol/L    Anion gap 6 3.0 - 18 mmol/L    Glucose 43 (LL) 74 - 99 mg/dL    BUN 13 7.0 - 18 MG/DL    Creatinine 0.90 0.6 - 1.3 MG/DL    BUN/Creatinine ratio 14 12 - 20      GFR est AA >60 >60 ml/min/1.73m2    GFR est non-AA >60 >60 ml/min/1.73m2    Calcium 9.4 8.5 - 10.1 MG/DL    Bilirubin, total 0.3 0.2 - 1.0 MG/DL    ALT (SGPT) 15 13 - 56 U/L    AST (SGOT) 12 10 - 38 U/L    Alk.  phosphatase 71 45 - 117 U/L    Protein, total 7.8 6.4 - 8.2 g/dL    Albumin 3.6 3.4 - 5.0 g/dL    Globulin 4.2 (H) 2.0 - 4.0 g/dL    A-G Ratio 0.9 0.8 - 1.7     LIPASE    Collection Time: 05/11/21  1:08 PM   Result Value Ref Range    Lipase 148 73 - 393 U/L   CARDIAC PANEL,(CK, CKMB & TROPONIN)    Collection Time: 05/11/21  1:08 PM   Result Value Ref Range    CK - MB <1.0 <3.6 ng/ml    CK-MB Index  0.0 - 4.0 %     CALCULATION NOT PERFORMED WHEN RESULT IS BELOW LINEAR LIMIT    CK 75 26 - 192 U/L    Troponin-I, QT 0.02 0.0 - 0.045 NG/ML   NT-PRO BNP    Collection Time: 05/11/21  1:08 PM   Result Value Ref Range    NT pro- 0 - 900 PG/ML   PROTHROMBIN TIME + INR    Collection Time: 05/11/21  1:08 PM   Result Value Ref Range    Prothrombin time 15.9 (H) 11.5 - 15.2 sec    INR 1.3 (H) 0.8 - 1.2     PTT    Collection Time: 05/11/21  1:08 PM   Result Value Ref Range    aPTT 32.3 23.0 - 36.4 SEC   D DIMER    Collection Time: 05/11/21  1:08 PM   Result Value Ref Range    D DIMER 0.83 (H) <0.46 ug/ml(FEU)   GLUCOSE, POC    Collection Time: 05/11/21  1:25 PM   Result Value Ref Range    Glucose (POC) 39 (LL) 70 - 110 mg/dL   GLUCOSE, POC    Collection Time: 05/11/21  2:11 PM   Result Value Ref Range    Glucose (POC) 136 (H) 70 - 110 mg/dL   URINALYSIS W/ RFLX MICROSCOPIC    Collection Time: 05/11/21  3:37 PM   Result Value Ref Range    Color YELLOW      Appearance CLEAR      Specific gravity 1.016 1.005 - 1.030      pH (UA) 6.0 5.0 - 8.0      Protein Negative NEG mg/dL    Glucose Negative NEG mg/dL    Ketone Negative NEG mg/dL    Bilirubin Negative NEG      Blood Negative NEG      Urobilinogen 0.2 0.2 - 1.0 EU/dL    Nitrites Negative NEG      Leukocyte Esterase Negative NEG     EKG, 12 LEAD, SUBSEQUENT    Collection Time: 05/11/21  4:17 PM   Result Value Ref Range    Ventricular Rate 60 BPM    Atrial Rate 60 BPM    P-R Interval 134 ms    QRS Duration 110 ms    Q-T Interval 436 ms    QTC Calculation (Bezet) 436 ms    Calculated P Axis 57 degrees    Calculated R Axis 24 degrees    Calculated T Axis 51 degrees    Diagnosis       Normal sinus rhythm  Incomplete right bundle branch block  Borderline ECG  When compared with ECG of 27-AUG-2019 14:48,  No significant change was found     CARDIAC PANEL,(CK, CKMB & TROPONIN)    Collection Time: 05/11/21  4:29 PM   Result Value Ref Range    CK - MB <1.0 <3.6 ng/ml    CK-MB Index  0.0 - 4.0 %     CALCULATION NOT PERFORMED WHEN RESULT IS BELOW LINEAR LIMIT    CK 69 26 - 192 U/L    Troponin-I, QT 0.03 0.0 - 0.045 NG/ML       CTA CHEST W OR W WO CONT   Final Result      1. No evidence of pulmonary embolism. 2.  Marked dilatation of the intrathoracic esophagus with retained fluid and   food contents spanning the entirety of the intrathoracic stomach.       > Correlation with prior upper GI exam performed 1/22/2019 demonstrate similar   exam findings. Correlation with prior upper GI/esophageal manometry recommended. Imaging features may reflect underlying achalasia/failed esophageal peristalsis.        > No pulmonary parenchymal abnormalities to suggest aspiration pneumonia or   pneumonitis with streaky basilar areas of atelectasis. 3. Unchanged multinodular thyroid goiter. Consider outpatient/routine thyroid   ultrasound if not performed previously. XR CHEST PORT   Final Result      Basilar scarring and/or atelectasis without superimposed acute radiographic   abnormality or significant interval change from prior. CT Results  (Last 48 hours)               05/11/21 1446  CTA CHEST W OR W WO CONT Final result    Impression:      1. No evidence of pulmonary embolism. 2.  Marked dilatation of the intrathoracic esophagus with retained fluid and   food contents spanning the entirety of the intrathoracic stomach. > Correlation with prior upper GI exam performed 1/22/2019 demonstrate similar   exam findings. Correlation with prior upper GI/esophageal manometry recommended. Imaging features may reflect underlying achalasia/failed esophageal peristalsis.         > No pulmonary parenchymal abnormalities to suggest aspiration pneumonia or   pneumonitis with streaky basilar areas of atelectasis. 3. Unchanged multinodular thyroid goiter. Consider outpatient/routine thyroid   ultrasound if not performed previously. Narrative:  EXAM: CTA Chest       INDICATION: 79year-old patient with chest pain and shortness of breath. Elevated d-dimer. COMPARISON: CT chest performed 1/7/2017; chest radiographs same day       TECHNIQUE: Axial CT imaging from the thoracic inlet through the diaphragm with   intravenous contrast utilizing CTA study for pulmonary artery evaluation. Coronal and sagittal MIP reformations were generated at a separate workstation.    One or more dose reduction techniques were used on this CT: automated exposure   control, adjustment of the mAs and/or kVp according to patient size, and   iterative reconstruction techniques. The specific techniques used on this CT   exam have been documented in the patient's electronic medical record. Digital   Imaging and Communications in Medicine (DICOM) format image data are available   to nonaffiliated external healthcare facilities or entities on a secure, media   free, reciprocally searchable basis with patient authorization for at least a   12-month period after this study. _______________       FINDINGS:       EXAM QUALITY: Overall exam quality is adequate. Pulmonary arterial enhancement   is adequate. The breath hold is satisfactory. Hannah Libman PULMONARY ARTERIES: No convincing evidence of pulmonary embolism. MEDIASTINUM: Included thyroid gland demonstrate several nodules within the left   and right thyroid lobes, largest on the left measuring approximately 1.9 x 3.1   cm in size. The cardiac size is normal. There is no pericardial effusion. Thoracic aorta is normal in course and caliber. There is marked distention of   the intrathoracic esophagus which is patulous in appearance with food and fluid   extending to the level of the thoracic inlet. LYMPH NODES: No supraclavicular, axilla, or mediastinal lymph node enlargement. AIRWAY: Unremarkable. LUNGS: Streaky basilar areas of atelectasis are present without evidence of   alveolar consolidation. No significant groundglass abnormality or abnormal   septal line thickening. No suspicious appearing pulmonary nodule or mass lesion. PLEURA: No pleural effusion or pneumothorax. UPPER ABDOMEN: Small hiatal hernia. Small gallstones within the gallbladder. No   pericholecystic inflammatory change. .       OTHER: No acute or aggressive osseous abnormalities identified.        _______________               CXR Results  (Last 48 hours) 05/11/21 1328  XR CHEST PORT Final result    Impression:      Basilar scarring and/or atelectasis without superimposed acute radiographic   abnormality or significant interval change from prior. Narrative:  EXAM: XR CHEST PORT       CLINICAL INDICATION/HISTORY: Chest pain   -Additional: None       COMPARISON: Several prior studies, D2 20/4/2019       TECHNIQUE: Frontal view of the chest       _______________       FINDINGS:       HEART AND MEDIASTINUM: Normal cardiac size and mediastinal contours. LUNGS AND PLEURAL SPACES: Linear bandlike areas of opacity are noted across the   lung bases bilaterally. No pneumothorax or pleural effusion. Overall appearance   is similar to prior studies. BONY THORAX AND SOFT TISSUES: No acute osseous abnormality       _______________                 Medications given in the ED-  Medications   lactated ringers bolus infusion 1,000 mL (0 mL IntraVENous Held 5/11/21 1319)   aspirin tablet 325 mg (325 mg Oral Given 5/11/21 1319)   acetaminophen (TYLENOL) tablet 1,000 mg (1,000 mg Oral Given 5/11/21 1319)   famotidine (PF) (PEPCID) injection 20 mg (20 mg IntraVENous Given 5/11/21 1319)   dextrose 10% infusion 125-250 mL (0 mL IntraVENous IV Completed 5/11/21 1353)   potassium chloride 10 mEq in 100 ml IVPB (0 mEq IntraVENous IV Completed 5/11/21 1614)   iopamidoL (ISOVUE-370) 76 % injection 100 mL (100 mL IntraVENous Given 5/11/21 1438)   potassium bicarb-citric acid (EFFER-K) tablet 40 mEq (40 mEq Oral Given 5/11/21 1609)         Medical Decision Making   I am the first provider for this patient. I reviewed the vital signs, available nursing notes, past medical history, past surgical history, family history and social history. Vital Signs-Reviewed the patient's vital signs. Pulse Oximetry Analysis - 100% on RA.  NORMAL     Records Reviewed: Nursing Notes and Old Medical Records    Provider Notes (Medical Decision Making): ACS/MI, PE, arrhyhtmia, pericarditis, myocarditis, esophagitis, GERD    Procedures:  Procedures    ED Course:   12:53 PM Initial assessment performed. The patients presenting problems have been discussed, and they are in agreement with the care plan formulated and outlined with them. I have encouraged them to ask questions as they arise throughout their visit. Diagnosis and Disposition       5:35 PM  Pt is currently pain free. K+ replaced. BS improved. Serial CE/EKG unremarkable. CTA chest neg with no PE. Evidence of dilatation of esophagus with prior GERD/esophagitis hx. Pt has appt with GI tomorrow for same. HEART score 4 but very atypical for cardiac given CTA findings. Discussed with attending. Will consult with cardiology prior to discharge. Pt aware of thyroid reading as well    5:38 PM Consult: I discussed care with Praful Knapp MD (cardiology) It was a standard discussion, including history of patients chief complaint, available diagnostic results, and treatment course. Agrees safe for discharge and FU. No ASA. Discussed findings with pt and her daughter. Provided impression of CTA chest to take to GI tomorrow. Reasons to RTED discussed with pt. All questions answered. Pt feels comfortable going home at this time. Pt expressed understanding and she agrees with plan. 1. Atypical chest pain    2. Hypokalemia    3. Hypoglycemia    4. Esophagitis    5. Thyroid goiter        PLAN:  1. D/C Home  2. Current Discharge Medication List      START taking these medications    Details   potassium bicarbonate (Effer-K) 25 mEq tablet Take 1 Tab by mouth two (2) times a day for 7 days. Indications: low amount of potassium in the blood  Qty: 14 Tab, Refills: 0  Start date: 5/11/2021, End date: 5/18/2021           3.    Follow-up Information     Follow up With Specialties Details Why [id number] [facility]   as scheduled tomorrow [address] [facility]  [phone] Daysi Godfrey MD Cardiology  follow up with cardiology [address]      Alysia Sprague MD Internal Medicine   [facility] [address] [facility] [address] [address] [phone]          _______________________________    Attestations: This note is prepared by Iris Mejia PA-C.  _______________________________          Please note that this dictation was completed with [employer], the computer voice recognition software. Quite often unanticipated grammatical, syntax, homophones, and other interpretive errors are inadvertently transcribed by the computer software. Please disregard these errors. Please excuse any errors that have escaped final proofreading.

## 2021-05-12 LAB
ATRIAL RATE: 76 BPM
CALCULATED P AXIS, ECG09: 63 DEGREES
CALCULATED R AXIS, ECG10: 43 DEGREES
CALCULATED T AXIS, ECG11: 50 DEGREES
DIAGNOSIS, 93000: NORMAL
P-R INTERVAL, ECG05: 138 MS
Q-T INTERVAL, ECG07: 392 MS
QRS DURATION, ECG06: 110 MS
QTC CALCULATION (BEZET), ECG08: 441 MS
VENTRICULAR RATE, ECG03: 76 BPM

## 2021-08-18 ENCOUNTER — TRANSCRIBE ORDER (OUTPATIENT)
Dept: SCHEDULING | Age: 71
End: 2021-08-18

## 2021-08-18 DIAGNOSIS — K22.0 ACHALASIA OF CARDIA: Primary | ICD-10-CM

## 2021-08-20 ENCOUNTER — TRANSCRIBE ORDER (OUTPATIENT)
Dept: SCHEDULING | Age: 71
End: 2021-08-20

## 2021-08-20 DIAGNOSIS — R13.10 DYSPHAGIA: ICD-10-CM

## 2021-08-20 DIAGNOSIS — R06.02 SHORTNESS OF BREATH: ICD-10-CM

## 2021-08-20 DIAGNOSIS — R10.9 ABDOMINAL PAIN: ICD-10-CM

## 2021-08-20 DIAGNOSIS — K22.0 ACHALASIA AND CARDIOSPASM: Primary | ICD-10-CM

## 2021-09-02 ENCOUNTER — HOSPITAL ENCOUNTER (OUTPATIENT)
Dept: CT IMAGING | Age: 71
Discharge: HOME | End: 2021-09-02
Payer: MEDICARE

## 2021-09-02 DIAGNOSIS — R10.9 ABDOMINAL PAIN: ICD-10-CM

## 2021-09-02 DIAGNOSIS — K22.0 ACHALASIA AND CARDIOSPASM: ICD-10-CM

## 2021-09-02 DIAGNOSIS — R06.02 SHORTNESS OF BREATH: ICD-10-CM

## 2021-09-02 LAB — CREAT UR-MCNC: 0.9 MG/DL (ref 0.6–1.3)

## 2021-09-02 PROCEDURE — 74011000636 HC RX REV CODE- 636

## 2021-09-02 PROCEDURE — 74160 CT ABDOMEN W/CONTRAST: CPT

## 2021-09-02 PROCEDURE — 82565 ASSAY OF CREATININE: CPT

## 2021-09-02 PROCEDURE — 74011000250 HC RX REV CODE- 250

## 2021-09-02 RX ORDER — BARIUM SULFATE 20 MG/ML
900 SUSPENSION ORAL
Status: COMPLETED | OUTPATIENT
Start: 2021-09-02 | End: 2021-09-02

## 2021-09-02 RX ADMIN — IOPAMIDOL 100 ML: 612 INJECTION, SOLUTION INTRAVENOUS at 14:43

## 2021-09-02 RX ADMIN — BARIUM SULFATE 900 ML: 20 SUSPENSION ORAL at 14:43

## 2021-09-07 ENCOUNTER — HOSPITAL ENCOUNTER (OUTPATIENT)
Dept: GENERAL RADIOLOGY | Age: 71
Discharge: HOME | End: 2021-09-07
Payer: MEDICARE

## 2021-09-07 DIAGNOSIS — K22.0 ACHALASIA AND CARDIOSPASM: ICD-10-CM

## 2021-09-07 DIAGNOSIS — R13.10 DYSPHAGIA: ICD-10-CM

## 2021-09-07 PROCEDURE — 74011000250 HC RX REV CODE- 250

## 2021-09-07 PROCEDURE — 74240 X-RAY XM UPR GI TRC 1CNTRST: CPT

## 2021-09-07 RX ADMIN — BARIUM SULFATE 176 G: 960 POWDER, FOR SUSPENSION ORAL at 11:57

## 2021-09-07 RX ADMIN — BARIUM SULFATE 135 ML: 980 POWDER, FOR SUSPENSION ORAL at 11:57

## 2021-09-07 RX ADMIN — ANTACID/ANTIFLATULENT 4 G: 380; 550; 10; 10 GRANULE, EFFERVESCENT ORAL at 11:57

## 2021-09-07 RX ADMIN — BARIUM SULFATE 700 MG: 700 TABLET ORAL at 11:57

## 2021-12-20 NOTE — ED NOTES
Patient states she is feeling better since her medication and fluids began. Patient states she is still feeling dizzy, but it is better. Patient is able to make needs known in clear sentences and is able to move all extremities. Patient c/o a dry cough she has had \"for awhile. \" Patient denies any recent illness. Histology Selection Override (Optional- Will Default To Parent Diagnosis If N/A): Basal Cell Carcinoma

## 2022-01-22 ENCOUNTER — APPOINTMENT (OUTPATIENT)
Dept: GENERAL RADIOLOGY | Age: 72
End: 2022-01-22
Attending: PHYSICIAN ASSISTANT
Payer: MEDICARE

## 2022-01-22 ENCOUNTER — HOSPITAL ENCOUNTER (EMERGENCY)
Age: 72
Discharge: HOME | End: 2022-01-22
Attending: EMERGENCY MEDICINE
Payer: MEDICARE

## 2022-01-22 VITALS
HEIGHT: 63 IN | SYSTOLIC BLOOD PRESSURE: 147 MMHG | BODY MASS INDEX: 23.04 KG/M2 | TEMPERATURE: 98.9 F | DIASTOLIC BLOOD PRESSURE: 65 MMHG | WEIGHT: 130 LBS | RESPIRATION RATE: 16 BRPM | HEART RATE: 56 BPM | OXYGEN SATURATION: 99 %

## 2022-01-22 DIAGNOSIS — R07.9 NONSPECIFIC CHEST PAIN: ICD-10-CM

## 2022-01-22 DIAGNOSIS — E11.9 TYPE 2 DIABETES MELLITUS WITHOUT COMPLICATION, WITH LONG-TERM CURRENT USE OF INSULIN (HCC): ICD-10-CM

## 2022-01-22 DIAGNOSIS — U07.1 COVID-19: Primary | ICD-10-CM

## 2022-01-22 DIAGNOSIS — R05.9 COUGH: ICD-10-CM

## 2022-01-22 DIAGNOSIS — Z79.4 TYPE 2 DIABETES MELLITUS WITHOUT COMPLICATION, WITH LONG-TERM CURRENT USE OF INSULIN (HCC): ICD-10-CM

## 2022-01-22 LAB
ALBUMIN SERPL-MCNC: 3.6 G/DL (ref 3.4–5)
ALBUMIN/GLOB SERPL: 0.9 {RATIO} (ref 0.8–1.7)
ALP SERPL-CCNC: 54 U/L (ref 45–117)
ALT SERPL-CCNC: 25 U/L (ref 13–56)
ANION GAP SERPL CALC-SCNC: 4 MMOL/L (ref 3–18)
AST SERPL-CCNC: 19 U/L (ref 10–38)
BASOPHILS # BLD: 0 K/UL (ref 0–0.1)
BASOPHILS NFR BLD: 0 % (ref 0–2)
BILIRUB SERPL-MCNC: 0.5 MG/DL (ref 0.2–1)
BUN SERPL-MCNC: 13 MG/DL (ref 7–18)
BUN/CREAT SERPL: 15 (ref 12–20)
CALCIUM SERPL-MCNC: 9.2 MG/DL (ref 8.5–10.1)
CHLORIDE SERPL-SCNC: 100 MMOL/L (ref 100–111)
CO2 SERPL-SCNC: 31 MMOL/L (ref 21–32)
COVID-19 RAPID TEST, COVR: DETECTED
CREAT SERPL-MCNC: 0.86 MG/DL (ref 0.6–1.3)
DIFFERENTIAL METHOD BLD: ABNORMAL
EOSINOPHIL # BLD: 0 K/UL (ref 0–0.4)
EOSINOPHIL NFR BLD: 1 % (ref 0–5)
ERYTHROCYTE [DISTWIDTH] IN BLOOD BY AUTOMATED COUNT: 12.3 % (ref 11.6–14.5)
GLOBULIN SER CALC-MCNC: 4 G/DL (ref 2–4)
GLUCOSE SERPL-MCNC: 216 MG/DL (ref 74–99)
HCT VFR BLD AUTO: 35.8 % (ref 35–45)
HGB BLD-MCNC: 12.1 G/DL (ref 12–16)
IMM GRANULOCYTES # BLD AUTO: 0 K/UL (ref 0–0.04)
IMM GRANULOCYTES NFR BLD AUTO: 0 % (ref 0–0.5)
LIPASE SERPL-CCNC: 155 U/L (ref 73–393)
LYMPHOCYTES # BLD: 1.6 K/UL (ref 0.9–3.6)
LYMPHOCYTES NFR BLD: 40 % (ref 21–52)
MAGNESIUM SERPL-MCNC: 1.9 MG/DL (ref 1.6–2.6)
MCH RBC QN AUTO: 29.5 PG (ref 24–34)
MCHC RBC AUTO-ENTMCNC: 33.8 G/DL (ref 31–37)
MCV RBC AUTO: 87.3 FL (ref 78–100)
MONOCYTES # BLD: 0.6 K/UL (ref 0.05–1.2)
MONOCYTES NFR BLD: 15 % (ref 3–10)
NEUTS SEG # BLD: 1.8 K/UL (ref 1.8–8)
NEUTS SEG NFR BLD: 45 % (ref 40–73)
NRBC # BLD: 0 K/UL (ref 0–0.01)
NRBC BLD-RTO: 0 PER 100 WBC
PLATELET # BLD AUTO: 178 K/UL (ref 135–420)
PMV BLD AUTO: 9.5 FL (ref 9.2–11.8)
POTASSIUM SERPL-SCNC: 3.6 MMOL/L (ref 3.5–5.5)
PROT SERPL-MCNC: 7.6 G/DL (ref 6.4–8.2)
RBC # BLD AUTO: 4.1 M/UL (ref 4.2–5.3)
SODIUM SERPL-SCNC: 135 MMOL/L (ref 136–145)
SOURCE, COVRS: ABNORMAL
TROPONIN-HIGH SENSITIVITY: 33 NG/L (ref 0–54)
TROPONIN-HIGH SENSITIVITY: 36 NG/L (ref 0–54)
WBC # BLD AUTO: 4.1 K/UL (ref 4.6–13.2)

## 2022-01-22 PROCEDURE — 84484 ASSAY OF TROPONIN QUANT: CPT

## 2022-01-22 PROCEDURE — 74011250636 HC RX REV CODE- 250/636: Performed by: PHYSICIAN ASSISTANT

## 2022-01-22 PROCEDURE — 99284 EMERGENCY DEPT VISIT MOD MDM: CPT

## 2022-01-22 PROCEDURE — M0247 HC SOTROVIMAB INFUSION: HCPCS

## 2022-01-22 PROCEDURE — 93005 ELECTROCARDIOGRAM TRACING: CPT

## 2022-01-22 PROCEDURE — 83690 ASSAY OF LIPASE: CPT

## 2022-01-22 PROCEDURE — 80053 COMPREHEN METABOLIC PANEL: CPT

## 2022-01-22 PROCEDURE — 85025 COMPLETE CBC W/AUTO DIFF WBC: CPT

## 2022-01-22 PROCEDURE — 87635 SARS-COV-2 COVID-19 AMP PRB: CPT

## 2022-01-22 PROCEDURE — 71045 X-RAY EXAM CHEST 1 VIEW: CPT

## 2022-01-22 PROCEDURE — 74011000258 HC RX REV CODE- 258: Performed by: PHYSICIAN ASSISTANT

## 2022-01-22 PROCEDURE — 83735 ASSAY OF MAGNESIUM: CPT

## 2022-01-22 RX ORDER — SODIUM CHLORIDE 9 MG/ML
20 INJECTION, SOLUTION INTRAVENOUS AS NEEDED
Status: DISCONTINUED | OUTPATIENT
Start: 2022-01-22 | End: 2022-01-22 | Stop reason: HOSPADM

## 2022-01-22 RX ORDER — BENZONATATE 100 MG/1
100 CAPSULE ORAL
Qty: 30 CAPSULE | Refills: 0 | Status: SHIPPED | OUTPATIENT
Start: 2022-01-22 | End: 2022-01-29

## 2022-01-22 RX ADMIN — SODIUM CHLORIDE 500 MG: 9 INJECTION, SOLUTION INTRAVENOUS at 18:47

## 2022-01-22 NOTE — ED PROVIDER NOTES
EMERGENCY DEPARTMENT HISTORY AND PHYSICAL EXAM    Date: 1/22/2022  Patient Name: Chelsy Long    History of Presenting Illness     Chief Complaint   Patient presents with    Epigastric Pain    Chest Pain         History Provided By: Patient    Chief Complaint: Chest pain, SOB, cough  Duration: 2 days  Timing:    Location:   Quality:   Severity:   Modifying Factors:   Associated Symptoms: none       Additional History (Context): Chelsy Long is a 70 y.o. female with a history of insulin dependent DM, achalasia presents via EMS for evaluation of retrosternal chest pain x 2 days. Feels like an aching pressure, constant, and similar to when she had pneumonia 3 years ago. Feels SOB with productive clear mucus cough. Has not been vaccinated for COVID, last tested in October. No sick contacts. No fever. Denies N/V/D, abdominal pain, back pain. PCP: Christine Bailey MD    Current Facility-Administered Medications   Medication Dose Route Frequency Provider Last Rate Last Admin    0.9% sodium chloride infusion  20 mL/hr IntraVENous PRN Dottie Candelario PA-C         Current Outpatient Medications   Medication Sig Dispense Refill    benzonatate (Tessalon Perles) 100 mg capsule Take 1 Capsule by mouth three (3) times daily as needed for Cough for up to 7 days. 30 Capsule 0    insulin aspart U-100 (NOVOLOG FLEXPEN U-100 INSULIN) 100 unit/mL inpn 12 Units by SubCUTAneous route Before breakfast, lunch, and dinner.  albuterol (PROVENTIL HFA, VENTOLIN HFA, PROAIR HFA) 90 mcg/actuation inhaler Take 2 Puffs by inhalation every four (4) hours as needed for Wheezing. 1 Inhaler 0    inhalational spacing device (E-Z SPACER) 1 Each by Does Not Apply route as needed. 1 Device 0    insulin detemir (LEVEMIR) 100 unit/mL injection 0.15 mL by SubCUTAneous route nightly. 1 Vial 0    levothyroxine (SYNTHROID) 100 mcg tablet Take 100 mcg by mouth Daily (before breakfast).  Instructed to take morning of surgery with sip of water.      simvastatin (ZOCOR) 40 mg tablet Take 40 mg by mouth nightly.  enalapril (VASOTEC) 20 mg tablet Take 20 mg by mouth daily. Instructed to take morning of surgery with sip of water         Past History     Past Medical History:  Past Medical History:   Diagnosis Date    Chronic pain     low back pain    Diabetes ([facility] [address]     type II, was on oral agents for a while    Hypercholesteremia     Hypertension     Ill-defined condition     kidny stones    Thyroid disease        Past Surgical History:  Past Surgical History:   Procedure Laterality Date    HX GYN  1977    tubal ligation    HX LITHOTRIPSY         Family History:  Family History   Problem Relation Age of Onset    Cancer Mother        Social History:  Social History     Tobacco Use    Smoking status: Former Smoker     Quit date: 1998     Years since quittin.6    Smokeless tobacco: Never Used   Substance Use Topics    Alcohol use: No     Comment: rare    Drug use: No       Allergies:  No Known Allergies      Review of Systems   Review of Systems   Constitutional: Negative for chills, fatigue and fever. HENT: Negative for congestion and trouble swallowing. Respiratory: Positive for cough, chest tightness and shortness of breath. Negative for wheezing. Cardiovascular: Positive for chest pain. Negative for palpitations and leg swelling. Gastrointestinal: Negative for abdominal pain, constipation, diarrhea, nausea and vomiting. Genitourinary: Negative for difficulty urinating, dysuria, flank pain and frequency. Musculoskeletal: Negative for arthralgias, back pain, gait problem, joint swelling and myalgias. Skin: Negative for color change and rash. Neurological: Negative for dizziness, weakness and headaches.      All Other Systems Negative  Physical Exam     Vitals:    22 1619   BP: (!) 147/65   Pulse: (!) 56   Resp: 16   Temp: 98.9 °F (37.2 °C)   SpO2: 99%   Weight: 59 kg (130 lb)   Height: 5' 3\" (1.6 m) Physical Exam  Constitutional:       General: She is not in acute distress. Appearance: She is well-developed and normal weight. She is not ill-appearing or toxic-appearing. HENT:      Head: Normocephalic and atraumatic. Eyes:      Extraocular Movements: Extraocular movements intact. Cardiovascular:      Rate and Rhythm: Regular rhythm. Bradycardia present. Pulses:           Radial pulses are 2+ on the right side and 2+ on the left side. Heart sounds: Normal heart sounds. Pulmonary:      Effort: Pulmonary effort is normal.      Breath sounds: Normal breath sounds. Abdominal:      Palpations: Abdomen is soft. There is no mass. Tenderness: There is abdominal tenderness (pt had tenderness with very mild palpation when nursing staff was applying cardiac monitoring adhesive to the epigastric area. Pt does not complain of pain there- continually points to her upper central chest, but is tender in the epigastric area. ). There is no guarding or rebound. Musculoskeletal:         General: Normal range of motion. Cervical back: Normal range of motion and neck supple. Skin:     General: Skin is warm and dry. Neurological:      General: No focal deficit present. Mental Status: She is alert and oriented to person, place, and time.    Psychiatric:         Mood and Affect: Mood normal.         Behavior: Behavior normal.           Diagnostic Study Results     Labs -     Recent Results (from the past 12 hour(s))   CBC WITH AUTOMATED DIFF    Collection Time: 01/22/22  4:30 PM   Result Value Ref Range    WBC 4.1 (L) 4.6 - 13.2 K/uL    RBC 4.10 (L) 4.20 - 5.30 M/uL    HGB 12.1 12.0 - 16.0 g/dL    HCT 35.8 35.0 - 45.0 %    MCV 87.3 78.0 - 100.0 FL    MCH 29.5 24.0 - 34.0 PG    MCHC 33.8 31.0 - 37.0 g/dL    RDW 12.3 11.6 - 14.5 %    PLATELET 342 397 - 260 K/uL    MPV 9.5 9.2 - 11.8 FL    NRBC 0.0 0  WBC    ABSOLUTE NRBC 0.00 0.00 - 0.01 K/uL    NEUTROPHILS 45 40 - 73 %    LYMPHOCYTES 40 21 - 52 %    MONOCYTES 15 (H) 3 - 10 %    EOSINOPHILS 1 0 - 5 %    BASOPHILS 0 0 - 2 %    IMMATURE GRANULOCYTES 0 0.0 - 0.5 %    ABS. NEUTROPHILS 1.8 1.8 - 8.0 K/UL    ABS. LYMPHOCYTES 1.6 0.9 - 3.6 K/UL    ABS. MONOCYTES 0.6 0.05 - 1.2 K/UL    ABS. EOSINOPHILS 0.0 0.0 - 0.4 K/UL    ABS. BASOPHILS 0.0 0.0 - 0.1 K/UL    ABS. IMM. GRANS. 0.0 0.00 - 0.04 K/UL    DF AUTOMATED     METABOLIC PANEL, COMPREHENSIVE    Collection Time: 01/22/22  4:30 PM   Result Value Ref Range    Sodium 135 (L) 136 - 145 mmol/L    Potassium 3.6 3.5 - 5.5 mmol/L    Chloride 100 100 - 111 mmol/L    CO2 31 21 - 32 mmol/L    Anion gap 4 3.0 - 18 mmol/L    Glucose 216 (H) 74 - 99 mg/dL    BUN 13 7.0 - 18 MG/DL    Creatinine 0.86 0.6 - 1.3 MG/DL    BUN/Creatinine ratio 15 12 - 20      GFR est AA >60 >60 ml/min/1.73m2    GFR est non-AA >60 >60 ml/min/1.73m2    Calcium 9.2 8.5 - 10.1 MG/DL    Bilirubin, total 0.5 0.2 - 1.0 MG/DL    ALT (SGPT) 25 13 - 56 U/L    AST (SGOT) 19 10 - 38 U/L    Alk.  phosphatase 54 45 - 117 U/L    Protein, total 7.6 6.4 - 8.2 g/dL    Albumin 3.6 3.4 - 5.0 g/dL    Globulin 4.0 2.0 - 4.0 g/dL    A-G Ratio 0.9 0.8 - 1.7     TROPONIN-HIGH SENSITIVITY    Collection Time: 01/22/22  4:30 PM   Result Value Ref Range    Troponin-High Sensitivity 33 0 - 54 ng/L   MAGNESIUM    Collection Time: 01/22/22  4:30 PM   Result Value Ref Range    Magnesium 1.9 1.6 - 2.6 mg/dL   LIPASE    Collection Time: 01/22/22  4:30 PM   Result Value Ref Range    Lipase 155 73 - 393 U/L   EKG, 12 LEAD, INITIAL    Collection Time: 01/22/22  4:38 PM   Result Value Ref Range    Ventricular Rate 56 BPM    Atrial Rate 56 BPM    P-R Interval 132 ms    QRS Duration 108 ms    Q-T Interval 454 ms    QTC Calculation (Bezet) 438 ms    Calculated P Axis 8 degrees    Calculated R Axis 78 degrees    Calculated T Axis 67 degrees    Diagnosis       Sinus bradycardia  Incomplete right bundle branch block  Borderline ECG  When compared with ECG of 11-MAY-2021 16:17,  No significant change was found     COVID-19 RAPID TEST    Collection Time: 01/22/22  4:42 PM   Result Value Ref Range    Specimen source Nasopharyngeal      COVID-19 rapid test Detected (AA) NOTD     TROPONIN-HIGH SENSITIVITY    Collection Time: 01/22/22  7:25 PM   Result Value Ref Range    Troponin-High Sensitivity 36 0 - 54 ng/L       Radiologic Studies -   XR CHEST PORT    (Results Pending)     CT Results  (Last 48 hours)    None        CXR Results  (Last 48 hours)    None            Medical Decision Making   I am the first provider for this patient. I reviewed the vital signs, available nursing notes, past medical history, past surgical history, family history and social history. Vital Signs-Reviewed the patient's vital signs. Records Reviewed: Nursing Notes and Old Medical Records     Procedures: None   Procedures    Provider Notes (Medical Decision Making):       9470: Lab called- pt COVID test positive. COVID positive, normal troponin and no abnormality on EKG at this time. Pt has normal O2 saturation (100% when I discussed her results with her), is not tachycardic. Discussed case with Dr. Jerel Flores- no criteria to admit at this time. Have offered pt the Monoclonal abs, and she wants to proceed with this. Will repeat the troponin to verify no worsening. 1757: Josiah 124 regarding monoclonal antibody infusion. Pt qualifies. Pt would like ot receive. mireya,      2008: Troponin repeat is <20% increase. Discussed with Dr. Jerel Flores and if no significant increase, recommend d/c home. Evaluated pt CXR, do not recommend covering with abx at this time. Pt received monoclonal abs in the ED. Return if any worsening. Pt is Wells negative for PE. HEART score positive for age, and diabetes, takes simvastatin for cholesterol. No hx of heart disease per pt. Discussed symptomatic treatment with pt, and vebralized understanding that if any worsening would return to the ER.      MED RECONCILIATION:  Current Facility-Administered Medications   Medication Dose Route Frequency    0.9% sodium chloride infusion  20 mL/hr IntraVENous PRN     Current Outpatient Medications   Medication Sig    benzonatate (Tessalon Perles) 100 mg capsule Take 1 Capsule by mouth three (3) times daily as needed for Cough for up to 7 days.  insulin aspart U-100 (NOVOLOG FLEXPEN U-100 INSULIN) 100 unit/mL inpn 12 Units by SubCUTAneous route Before breakfast, lunch, and dinner.  albuterol (PROVENTIL HFA, VENTOLIN HFA, PROAIR HFA) 90 mcg/actuation inhaler Take 2 Puffs by inhalation every four (4) hours as needed for Wheezing.  inhalational spacing device (E-Z SPACER) 1 Each by Does Not Apply route as needed.  insulin detemir (LEVEMIR) 100 unit/mL injection 0.15 mL by SubCUTAneous route nightly.  levothyroxine (SYNTHROID) 100 mcg tablet Take 100 mcg by mouth Daily (before breakfast). Instructed to take morning of surgery with sip of water.  simvastatin (ZOCOR) 40 mg tablet Take 40 mg by mouth nightly.  enalapril (VASOTEC) 20 mg tablet Take 20 mg by mouth daily. Instructed to take morning of surgery with sip of water       Disposition:  Home     DISCHARGE NOTE:   Pt has been reexamined. Patient has no new complaints, changes, or physical findings. Care plan outlined and precautions discussed. Results of workup were reviewed with the patient. All medications were reviewed with the patient. All of pt's questions and concerns were addressed. Patient was instructed and agrees to follow up with PCP as well as to return to the ED upon further deterioration. Patient is ready to go home.     Follow-up Information     Follow up With Specialties Details Why Contact Info    Radha Booth MD Internal Medicine In 3 days  [facility] [address] [address] [place] [id number] [address] [facility]      THE [facility] EMERGENCY DEPT Emergency Medicine  If symptoms worsen [address] [id number]  [phone]          Current Discharge Medication List      START taking these medications    Details   benzonatate (Tessalon Perles) 100 mg capsule Take 1 Capsule by mouth three (3) times daily as needed for Cough for up to 7 days. Qty: 30 Capsule, Refills: 0  Start date: 1/22/2022, End date: 1/29/2022                 Diagnosis     Clinical Impression:   1. COVID-19    2. Nonspecific chest pain    3. Type 2 diabetes mellitus without complication, with long-term current use of insulin ([facility] [address]    4. Cough          \"Please note that this dictation was completed with [employer], the computer voice recognition software. Quite often unanticipated grammatical, syntax, homophones, and other interpretive errors are inadvertently transcribed by the computer software. Please disregard these errors. Please excuse any errors that have escaped final proofreading. \"

## 2022-01-22 NOTE — ED TRIAGE NOTES
Pt states epigastric pain radiating into chest, onset 2 days ago, pt states pain is worse.   Pt is tender to touch in epigastric region, denies v/d

## 2022-01-23 LAB
ATRIAL RATE: 56 BPM
CALCULATED P AXIS, ECG09: 8 DEGREES
CALCULATED R AXIS, ECG10: 78 DEGREES
CALCULATED T AXIS, ECG11: 67 DEGREES
DIAGNOSIS, 93000: NORMAL
P-R INTERVAL, ECG05: 132 MS
Q-T INTERVAL, ECG07: 454 MS
QRS DURATION, ECG06: 108 MS
QTC CALCULATION (BEZET), ECG08: 438 MS
VENTRICULAR RATE, ECG03: 56 BPM

## 2022-01-23 NOTE — DISCHARGE INSTRUCTIONS
Watch for any worsening symptoms such as continued chest pain, shortness of breath. Can take tessalon perles for cough, Mucinex is also ok. Recommend Robitussin if you need a cough syrup  Contact your primary care provider to arrange for follow up    [facility] Guidance for Preventing the Spread of Coronavirus Disease 2019 (COVID-19) in Homes and Residential Communities  Prevention steps for:  People with confirmed or suspected COVID-19 (including persons under investigation) who do not need to be hospitalized  and  People with confirmed COVID-19 who were hospitalized and determined to be medically stable to go home  Your healthcare provider and public health staff will evaluate whether you can be cared for at home. If it is determined that you do not need to be hospitalized and can be isolated at home, you will be monitored by staff from your local or state health department. You should follow the prevention steps below until a healthcare provider or local or state health department says you can return to your normal activities. Stay home except to get medical care  You should restrict activities outside your home, except for getting medical care. Do not go to work, school, or public areas. Avoid using public transportation, ride-sharing, or taxis. Separate yourself from other people and animals in your home  People: As much as possible, you should stay in a specific room and away from other people in your home. Also, you should use a separate bathroom, if available. Animals: You should restrict contact with pets and other animals while you are sick with COVID-19, just like you would around other people. Although there have not been reports of pets or other animals becoming sick with COVID-19, it is still recommended that people sick with COVID-19 limit contact with animals until more information is known about the virus. When possible, have another member of your household care for your animals while you are sick. If you are sick with COVID-19, avoid contact with your pet, including petting, snuggling, being kissed or licked, and sharing food. If you must care for your pet or be around animals while you are sick, wash your hands before and after you interact with pets and wear a facemask. Call ahead before visiting your doctor  If you have a medical appointment, call the healthcare provider and tell them that you have or may have COVID-19. This will help the healthcare provider's office take steps to keep other people from getting infected or exposed. Wear a facemask  You should wear a facemask when you are around other people (e.g., sharing a room or vehicle) or pets and before you enter a healthcare provider's office. If you are not able to wear a facemask (for example, because it causes trouble breathing), then people who live with you should not stay in the same room with you, or they should wear a facemask if they enter your room. Cover your coughs and sneezes  Cover your mouth and nose with a tissue when you cough or sneeze. Throw used tissues in a lined trash can; immediately wash your hands with soap and water for at least 20 seconds or clean your hands with an alcohol-based hand  that contains 60 to 95% alcohol, covering all surfaces of your hands and rubbing them together until they feel dry. Soap and water should be used preferentially if hands are visibly dirty. Clean your hands often  Wash your hands often with soap and water for at least 20 seconds or clean your hands with an alcohol-based hand  that contains 60 to 95% alcohol, covering all surfaces of your hands and rubbing them together until they feel dry. Soap and water should be used preferentially if hands are visibly dirty. Avoid touching your eyes, nose, and mouth with unwashed hands.   Avoid sharing personal household items  You should not share dishes, drinking glasses, cups, eating utensils, towels, or bedding with other people or pets in your home. After using these items, they should be washed thoroughly with soap and water. Clean all high-touch surfaces everyday  High touch surfaces include counters, tabletops, doorknobs, bathroom fixtures, toilets, phones, keyboards, tablets, and bedside tables. Also, clean any surfaces that may have blood, stool, or body fluids on them. Use a household cleaning spray or wipe, according to the label instructions. Labels contain instructions for safe and effective use of the cleaning product including precautions you should take when applying the product, such as wearing gloves and making sure you have good ventilation during use of the product. Monitor your symptoms  Seek prompt medical attention if your illness is worsening (e.g., difficulty breathing). Before seeking care, call your healthcare provider and tell them that you have, or are being evaluated for, COVID-19. Put on a facemask before you enter the facility. These steps will help the healthcare provider's office to keep other people in the office or waiting room from getting infected or exposed. Ask your healthcare provider to call the local or state health department. Persons who are placed under active monitoring or facilitated self-monitoring should follow instructions provided by their local health department or occupational health professionals, as appropriate. If you have a medical emergency and need to call 911, notify the dispatch personnel that you have, or are being evaluated for COVID-19. If possible, put on a facemask before emergency medical services arrive. Discontinuing home isolation  Patients with confirmed COVID-19 should remain under home isolation precautions until the risk of secondary transmission to others is thought to be low. The decision to discontinue home isolation precautions should be made on a case-by-case basis, in consultation with healthcare providers and state and local health departments.   Recommended precautions for household members, intimate partners, and caregivers in a nonhealthcare setting of  A patient with symptomatic laboratory-confirmed COVID-19  or  A patient under investigation  Household members, intimate partners, and caregivers in a nonhealthcare setting may have close contact2 with a person with symptomatic, laboratory-confirmed COVID-19 or a person under investigation. Close contacts should monitor their health; they should call their healthcare provider right away if they develop symptoms suggestive of COVID-19 (e.g., fever, cough, shortness of breath)   Close contacts should also follow these recommendations:  Make sure that you understand and can help the patient follow their healthcare provider's instructions for medication(s) and care. You should help the patient with basic needs in the home and provide support for getting groceries, prescriptions, and other personal needs. Monitor the patient's symptoms. If the patient is getting sicker, call his or her healthcare provider and tell them that the patient has laboratory-confirmed COVID-19. This will help the healthcare provider's office take steps to keep other people in the office or waiting room from getting infected. Ask the healthcare provider to call the local or [facility] health department for additional guidance. If the patient has a medical emergency and you need to call 911, notify the dispatch personnel that the patient has, or is being evaluated for COVID-19. Household members should stay in another room or be  from the patient as much as possible. Household members should use a separate bedroom and bathroom, if available. Prohibit visitors who do not have an essential need to be in the home. Household members should care for any pets in the home. Do not handle pets or other animals while sick.     Make sure that shared spaces in the home have good air flow, such as by an air conditioner or an opened window, weather permitting. Perform hand hygiene frequently. Wash your hands often with soap and water for at least 20 seconds or use an alcohol-based hand  that contains 60 to 95% alcohol, covering all surfaces of your hands and rubbing them together until they feel dry. Soap and water should be used preferentially if hands are visibly dirty. Avoid touching your eyes, nose, and mouth with unwashed hands. You and the patient should wear a facemask if you are in the same room. Wear a disposable facemask and gloves when you touch or have contact with the patient's blood, stool, or body fluids, such as saliva, sputum, nasal mucus, vomit, urine. Throw out disposable facemasks and gloves after using them. Do not reuse. When removing personal protective equipment, first remove and dispose of gloves. Then, immediately clean your hands with soap and water or alcohol-based hand . Next, remove and dispose of facemask, and immediately clean your hands again with soap and water or alcohol-based hand . Avoid sharing household items with the patient. You should not share dishes, drinking glasses, cups, eating utensils, towels, bedding, or other items. After the patient uses these items, you should wash them thoroughly (see below [employer]). Clean all high-touch surfaces, such as counters, tabletops, doorknobs, bathroom fixtures, toilets, phones, keyboards, tablets, and bedside tables, every day. Also, clean any surfaces that may have blood, stool, or body fluids on them. Use a household cleaning spray or wipe, according to the label instructions. Labels contain instructions for safe and effective use of the cleaning product including precautions you should take when applying the product, such as wearing gloves and making sure you have good ventilation during use of the product. [address] thoroughly.   Immediately remove and wash clothes or bedding that have blood, stool, or body fluids on them.  Wear disposable gloves while handling soiled items and keep soiled items away from your body. Clean your hands (with soap and water or an alcohol-based hand ) immediately after removing your gloves. Read and follow directions on labels of laundry or clothing items and detergent. In general, using a normal laundry detergent according to washing machine instructions and dry thoroughly using the warmest temperatures recommended on the clothing label. Place all used disposable gloves, facemasks, and other contaminated items in a lined container before disposing of them with other household waste. Clean your hands (with soap and water or an alcohol-based hand ) immediately after handling these items. Soap and water should be used preferentially if hands are visibly dirty. Discuss any additional questions with your state or local health department or healthcare provider. Footnotes  2Close contact is defined as--  a) being within approximately 6 feet (2 meters) of a COVID-19 case for a prolonged period of time; close contact can occur while caring for, living with, visiting, or sharing a health care waiting area or room with a COVID-19 case  - or -  b) having direct contact with infectious secretions of a COVID-19 case (e.g., being coughed on).   Page last reviewed: February 18, 2020   Content source: [facility] ([facility]), Division of Viral Diseases

## 2022-01-24 ENCOUNTER — PATIENT OUTREACH (OUTPATIENT)
Dept: CASE MANAGEMENT | Age: 72
End: 2022-01-24

## 2022-01-24 NOTE — PROGRESS NOTES
Ambulatory Care Coordination ED COVID Follow up Call    Challenges to be reviewed by the provider   Additional needs identified to be addressed with provider no  none           Encounter was not routed to provider for escalation. Method of communication with provider : phone    Discussed [id number] related testing which was available at this time. Test results were positive. Patient informed of results, if available? yes. Current Symptoms: cough, shortness of breath and chest pain    Reviewed New or Changed Meds: yes    Do you have what you need at home?  Durable Medical Equipment ordered at discharge: None   Home Health/Outpatient orders at discharge: none    Pulse oximeter? no Discussed and confirmed pulse oximeter discharge instructions and when to notify provider or seek emergency care. Patient education provided: Reviewed appropriate site of care based on symptoms and resources available to patient including: PCP. Follow up appointment recommended: yes. If no appointment scheduled, scheduling offered: Patient already has PCP follow up scheduled for 2/1/22. No future appointments. Interventions: Obtained and reviewed discharge summary and/or continuity of care documents and Assessment and support for treatment adherence and medication management-. Reviewed discharge instructions, medical action plan and red flags with patient who verbalized understanding. Provided contact information for future needs. Plan for follow-up call in 5-7 days based on severity of symptoms and risk factors.   Plan for next call: self management-.     Renetta Wynne

## 2022-01-31 ENCOUNTER — PATIENT OUTREACH (OUTPATIENT)
Dept: CASE MANAGEMENT | Age: 72
End: 2022-01-31

## 2022-01-31 NOTE — PROGRESS NOTES
Patient resolved from [address] Transitions episode on 1/31/22. Discussed COVID-19 related testing which was available at this time. Test results were positive. Patient informed of results, if available? yes     Patient/family has been provided the following resources and education related to COVID-19:                         Signs, symptoms and red flags related to COVID-19            [facility] [place] exposure and quarantine guidelines            [employer] exposure contact - [phone]            Contact for their local Department of Health                 Patient currently reports that the following symptoms have improved:  no new symptoms and no worsening symptoms. No further outreach scheduled with this CTN/ACM/LPN/HC/ MA. Episode of Care resolved. Patient has this CTN/ACM/LPN/HC/MA contact information if future needs arise.

## 2022-03-19 PROBLEM — H81.09 MENIERE DISEASE: Status: ACTIVE | Noted: 2017-01-08

## 2022-03-19 PROBLEM — R06.03 RESPIRATORY DISTRESS, ACUTE: Status: ACTIVE | Noted: 2017-01-07

## 2022-03-19 PROBLEM — J18.9 PNEUMONIA: Status: ACTIVE | Noted: 2017-01-07

## 2022-03-19 PROBLEM — E86.0 DEHYDRATION: Status: ACTIVE | Noted: 2017-01-07

## 2022-03-19 PROBLEM — I95.1 ORTHOSTASIS: Status: ACTIVE | Noted: 2017-01-07

## 2022-03-19 PROBLEM — I10 HTN (HYPERTENSION): Status: ACTIVE | Noted: 2017-01-07

## 2022-03-19 PROBLEM — R09.02 HYPOXIA: Status: ACTIVE | Noted: 2017-01-07

## 2022-03-20 PROBLEM — J45.901 ASTHMA EXACERBATION: Status: ACTIVE | Noted: 2017-01-08

## 2022-03-20 PROBLEM — E11.9 DM W/O COMPLICATION TYPE II (HCC): Status: ACTIVE | Noted: 2017-01-07

## 2022-10-19 ENCOUNTER — HOSPITAL ENCOUNTER (EMERGENCY)
Age: 72
Discharge: HOME | End: 2022-10-19
Attending: EMERGENCY MEDICINE
Payer: MEDICARE

## 2022-10-19 ENCOUNTER — APPOINTMENT (OUTPATIENT)
Dept: GENERAL RADIOLOGY | Age: 72
End: 2022-10-19
Attending: EMERGENCY MEDICINE
Payer: MEDICARE

## 2022-10-19 VITALS
OXYGEN SATURATION: 95 % | BODY MASS INDEX: 23.92 KG/M2 | HEIGHT: 63 IN | RESPIRATION RATE: 14 BRPM | DIASTOLIC BLOOD PRESSURE: 67 MMHG | HEART RATE: 59 BPM | WEIGHT: 135 LBS | SYSTOLIC BLOOD PRESSURE: 127 MMHG | TEMPERATURE: 97.3 F

## 2022-10-19 DIAGNOSIS — J06.9 VIRAL UPPER RESPIRATORY TRACT INFECTION: Primary | ICD-10-CM

## 2022-10-19 DIAGNOSIS — S29.012A MUSCLE STRAIN OF LEFT UPPER BACK, INITIAL ENCOUNTER: ICD-10-CM

## 2022-10-19 LAB
ALBUMIN SERPL-MCNC: 4.1 G/DL (ref 3.4–5)
ALBUMIN/GLOB SERPL: 1 {RATIO} (ref 0.8–1.7)
ALP SERPL-CCNC: 68 U/L (ref 45–117)
ALT SERPL-CCNC: 16 U/L (ref 13–56)
ANION GAP SERPL CALC-SCNC: 7 MMOL/L (ref 3–18)
AST SERPL-CCNC: 12 U/L (ref 10–38)
ATRIAL RATE: 63 BPM
ATRIAL RATE: 74 BPM
BASOPHILS # BLD: 0.1 K/UL (ref 0–0.1)
BASOPHILS NFR BLD: 1 % (ref 0–2)
BILIRUB SERPL-MCNC: 0.5 MG/DL (ref 0.2–1)
BUN SERPL-MCNC: 16 MG/DL (ref 7–18)
BUN/CREAT SERPL: 16 (ref 12–20)
CALCIUM SERPL-MCNC: 10.1 MG/DL (ref 8.5–10.1)
CALCULATED P AXIS, ECG09: 42 DEGREES
CALCULATED P AXIS, ECG09: 65 DEGREES
CALCULATED R AXIS, ECG10: 57 DEGREES
CALCULATED R AXIS, ECG10: 63 DEGREES
CALCULATED T AXIS, ECG11: 44 DEGREES
CALCULATED T AXIS, ECG11: 50 DEGREES
CHLORIDE SERPL-SCNC: 99 MMOL/L (ref 100–111)
CO2 SERPL-SCNC: 33 MMOL/L (ref 21–32)
CREAT SERPL-MCNC: 1 MG/DL (ref 0.6–1.3)
D DIMER PPP FEU-MCNC: 0.57 UG/ML(FEU)
DIAGNOSIS, 93000: NORMAL
DIAGNOSIS, 93000: NORMAL
DIFFERENTIAL METHOD BLD: ABNORMAL
EOSINOPHIL # BLD: 0.2 K/UL (ref 0–0.4)
EOSINOPHIL NFR BLD: 2 % (ref 0–5)
ERYTHROCYTE [DISTWIDTH] IN BLOOD BY AUTOMATED COUNT: 12.6 % (ref 11.6–14.5)
GLOBULIN SER CALC-MCNC: 4.2 G/DL (ref 2–4)
GLUCOSE SERPL-MCNC: 245 MG/DL (ref 74–99)
HCT VFR BLD AUTO: 39.8 % (ref 35–45)
HGB BLD-MCNC: 13.5 G/DL (ref 12–16)
IMM GRANULOCYTES # BLD AUTO: 0 K/UL (ref 0–0.04)
IMM GRANULOCYTES NFR BLD AUTO: 0 % (ref 0–0.5)
LYMPHOCYTES # BLD: 1.7 K/UL (ref 0.9–3.6)
LYMPHOCYTES NFR BLD: 23 % (ref 21–52)
MCH RBC QN AUTO: 30.5 PG (ref 24–34)
MCHC RBC AUTO-ENTMCNC: 33.9 G/DL (ref 31–37)
MCV RBC AUTO: 89.8 FL (ref 78–100)
MONOCYTES # BLD: 1 K/UL (ref 0.05–1.2)
MONOCYTES NFR BLD: 14 % (ref 3–10)
NEUTS SEG # BLD: 4.7 K/UL (ref 1.8–8)
NEUTS SEG NFR BLD: 61 % (ref 40–73)
NRBC # BLD: 0 K/UL (ref 0–0.01)
NRBC BLD-RTO: 0 PER 100 WBC
P-R INTERVAL, ECG05: 130 MS
P-R INTERVAL, ECG05: 150 MS
PLATELET # BLD AUTO: 278 K/UL (ref 135–420)
PMV BLD AUTO: 9.5 FL (ref 9.2–11.8)
POTASSIUM SERPL-SCNC: 3.3 MMOL/L (ref 3.5–5.5)
PROT SERPL-MCNC: 8.3 G/DL (ref 6.4–8.2)
Q-T INTERVAL, ECG07: 408 MS
Q-T INTERVAL, ECG07: 452 MS
QRS DURATION, ECG06: 110 MS
QRS DURATION, ECG06: 120 MS
QTC CALCULATION (BEZET), ECG08: 452 MS
QTC CALCULATION (BEZET), ECG08: 462 MS
RBC # BLD AUTO: 4.43 M/UL (ref 4.2–5.3)
SARS-COV-2, COV2: NORMAL
SODIUM SERPL-SCNC: 139 MMOL/L (ref 136–145)
TROPONIN-HIGH SENSITIVITY: 29 NG/L (ref 0–54)
TROPONIN-HIGH SENSITIVITY: 33 NG/L (ref 0–54)
VENTRICULAR RATE, ECG03: 63 BPM
VENTRICULAR RATE, ECG03: 74 BPM
WBC # BLD AUTO: 7.7 K/UL (ref 4.6–13.2)

## 2022-10-19 PROCEDURE — 74011250637 HC RX REV CODE- 250/637: Performed by: PHYSICIAN ASSISTANT

## 2022-10-19 PROCEDURE — 84484 ASSAY OF TROPONIN QUANT: CPT

## 2022-10-19 PROCEDURE — 85025 COMPLETE CBC W/AUTO DIFF WBC: CPT

## 2022-10-19 PROCEDURE — 85379 FIBRIN DEGRADATION QUANT: CPT

## 2022-10-19 PROCEDURE — 93005 ELECTROCARDIOGRAM TRACING: CPT

## 2022-10-19 PROCEDURE — 80053 COMPREHEN METABOLIC PANEL: CPT

## 2022-10-19 PROCEDURE — 99285 EMERGENCY DEPT VISIT HI MDM: CPT

## 2022-10-19 PROCEDURE — 71045 X-RAY EXAM CHEST 1 VIEW: CPT

## 2022-10-19 PROCEDURE — U0003 INFECTIOUS AGENT DETECTION BY NUCLEIC ACID (DNA OR RNA); SEVERE ACUTE RESPIRATORY SYNDROME CORONAVIRUS 2 (SARS-COV-2) (CORONAVIRUS DISEASE [COVID-19]), AMPLIFIED PROBE TECHNIQUE, MAKING USE OF HIGH THROUGHPUT TECHNOLOGIES AS DESCRIBED BY CMS-2020-01-R: HCPCS

## 2022-10-19 RX ORDER — GUAIFENESIN 600 MG/1
600 TABLET, EXTENDED RELEASE ORAL
Qty: 10 TABLET | Refills: 0 | Status: SHIPPED | OUTPATIENT
Start: 2022-10-19

## 2022-10-19 RX ORDER — METHOCARBAMOL 500 MG/1
500 TABLET, FILM COATED ORAL ONCE
Status: COMPLETED | OUTPATIENT
Start: 2022-10-19 | End: 2022-10-19

## 2022-10-19 RX ORDER — METHOCARBAMOL 750 MG/1
750 TABLET, FILM COATED ORAL
Qty: 12 TABLET | Refills: 0 | Status: SHIPPED | OUTPATIENT
Start: 2022-10-19

## 2022-10-19 RX ADMIN — METHOCARBAMOL TABLETS 500 MG: 500 TABLET, COATED ORAL at 16:32

## 2022-10-19 NOTE — ED TRIAGE NOTES
Patient reports she has a cough and it hurts in her chest due to coughing so much and her upper back hurt

## 2022-10-19 NOTE — ED PROVIDER NOTES
EMERGENCY DEPARTMENT HISTORY AND PHYSICAL EXAM    Date: 10/19/2022  Patient Name: Cyndi Nguyen    History of Presenting Illness     Chief Complaint   Patient presents with    Cough    Chest Pain    Back Pain         History Provided By: Patient    2:18 PM  Cyndi Nguyen is a 70 y.o. female with PMHX of HTN, HLD, DM, hypothyroidism who presents to the emergency department C/O cough, sometimes productive of white sputum with chest pain and mid back, which began 3 to 4 days ago. Patient states most of her pain is to her left mid back is worse with certain movements. She denies any known sick contacts. Had COVID-19 a few months ago without complication. She is not vaccinated gets COVID-19 or flu. Patient denies history of any cardiopulmonary disease. Pt denies fever, chills, body aches, nasal congestion, sore throat, vomiting, diarrhea, abdominal pain, shortness of breath, and any other sxs or complaints. PCP: Marcel Ross MD    Current Outpatient Medications   Medication Sig Dispense Refill    methocarbamoL (Robaxin-750) 750 mg tablet Take 1 Tablet by mouth three (3) times daily as needed for Muscle Spasm(s). 12 Tablet 0    guaiFENesin ER (Mucinex) 600 mg ER tablet Take 1 Tablet by mouth two (2) times daily as needed for Congestion. 10 Tablet 0    insulin aspart U-100 (NOVOLOG FLEXPEN U-100 INSULIN) 100 unit/mL inpn 12 Units by SubCUTAneous route Before breakfast, lunch, and dinner. albuterol (PROVENTIL HFA, VENTOLIN HFA, PROAIR HFA) 90 mcg/actuation inhaler Take 2 Puffs by inhalation every four (4) hours as needed for Wheezing. 1 Inhaler 0    inhalational spacing device (E-Z SPACER) 1 Each by Does Not Apply route as needed. 1 Device 0    insulin detemir (LEVEMIR) 100 unit/mL injection 0.15 mL by SubCUTAneous route nightly. 1 Vial 0    levothyroxine (SYNTHROID) 100 mcg tablet Take 100 mcg by mouth Daily (before breakfast). Instructed to take morning of surgery with sip of water.       simvastatin (ZOCOR) 40 mg tablet Take 40 mg by mouth nightly. enalapril (VASOTEC) 20 mg tablet Take 20 mg by mouth daily. Instructed to take morning of surgery with sip of water         Past History     Past Medical History:  Past Medical History:   Diagnosis Date    Chronic pain     low back pain    Diabetes ([address]     type II, was on oral agents for a while    Hypercholesteremia     Hypertension     Ill-defined condition     kidny stones    Thyroid disease        Past Surgical History:  Past Surgical History:   Procedure Laterality Date    HX GYN  1977    tubal ligation    HX LITHOTRIPSY         Family History:  Family History   Problem Relation Age of Onset    Cancer Mother        Social History:  Social History     Tobacco Use    Smoking status: Former     Types: Cigarettes     Quit date: 1998     Years since quittin.3    Smokeless tobacco: Never   Substance Use Topics    Alcohol use: No     Comment: rare    Drug use: No       Allergies:  No Known Allergies      Review of Systems   Review of Systems   Constitutional: Negative. Negative for fever. Respiratory:  Positive for cough. Negative for shortness of breath. Cardiovascular:  Positive for chest pain. Negative for leg swelling. Gastrointestinal:  Negative for abdominal pain. Musculoskeletal:  Positive for back pain. All other systems reviewed and are negative. Physical Exam     Vitals:    10/19/22 1320 10/19/22 1430 10/19/22 1533 10/19/22 1617   BP: 135/62 127/71 137/68 127/67   Pulse: 66 65 (!) 58 (!) 59   Resp: 16 16 17 14   Temp:       SpO2: 96% 95% 96% 95%   Weight:       Height:         Physical Exam  Vital signs and nursing notes reviewed. CONSTITUTIONAL: Alert. Well-appearing; thin female, in no apparent distress. HEAD: Normocephalic; atraumatic. EYES:  Conjunctiva clear. ENT:  Moist mucus membranes. NECK: Supple; FROM without difficulty, non-tender; no cervical lymphadenopathy.              CV: Normal S1, S2; no murmurs, rubs, or gallops. No chest wall tenderness. RESPIRATORY: Normal chest excursion with respiration; breath sounds clear and equal bilaterally; no wheezes, rhonchi, or rales. GI: Normal bowel sounds; non-distended; non-tender; no guarding or rigidity; no palpable organomegaly. No CVA tenderness. BACK:  No evidence of trauma or deformity. +mild TTP left thoracic back; FROM without difficulty. Negative straight leg raise bilaterally. EXT: Normal ROM in all four extremities; non-tender to palpation. No edema. SKIN: Normal for age and race; warm; dry; good turgor; no apparent lesions or exudate. NEURO: A & O x3. PSYCH:  Mood and affect appropriate.            Diagnostic Study Results     Labs -     Recent Results (from the past 12 hour(s))   EKG, 12 LEAD, INITIAL    Collection Time: 10/19/22  1:11 PM   Result Value Ref Range    Ventricular Rate 74 BPM    Atrial Rate 74 BPM    P-R Interval 150 ms    QRS Duration 110 ms    Q-T Interval 408 ms    QTC Calculation (Bezet) 452 ms    Calculated P Axis 65 degrees    Calculated R Axis 63 degrees    Calculated T Axis 44 degrees    Diagnosis       Normal sinus rhythm  Right bundle branch block  Abnormal ECG  When compared with ECG of 22-JAN-2022 16:38,  Right bundle branch block has replaced Incomplete right bundle branch block     TROPONIN-HIGH SENSITIVITY    Collection Time: 10/19/22  1:15 PM   Result Value Ref Range    Troponin-High Sensitivity 33 0 - 54 ng/L   CBC WITH AUTOMATED DIFF    Collection Time: 10/19/22  1:15 PM   Result Value Ref Range    WBC 7.7 4.6 - 13.2 K/uL    RBC 4.43 4.20 - 5.30 M/uL    HGB 13.5 12.0 - 16.0 g/dL    HCT 39.8 35.0 - 45.0 %    MCV 89.8 78.0 - 100.0 FL    MCH 30.5 24.0 - 34.0 PG    MCHC 33.9 31.0 - 37.0 g/dL    RDW 12.6 11.6 - 14.5 %    PLATELET 169 301 - 236 K/uL    MPV 9.5 9.2 - 11.8 FL    NRBC 0.0 0  WBC    ABSOLUTE NRBC 0.00 0.00 - 0.01 K/uL    NEUTROPHILS 61 40 - 73 %    LYMPHOCYTES 23 21 - 52 %    MONOCYTES 14 (H) 3 - 10 % EOSINOPHILS 2 0 - 5 %    BASOPHILS 1 0 - 2 %    IMMATURE GRANULOCYTES 0 0.0 - 0.5 %    ABS. NEUTROPHILS 4.7 1.8 - 8.0 K/UL    ABS. LYMPHOCYTES 1.7 0.9 - 3.6 K/UL    ABS. MONOCYTES 1.0 0.05 - 1.2 K/UL    ABS. EOSINOPHILS 0.2 0.0 - 0.4 K/UL    ABS. BASOPHILS 0.1 0.0 - 0.1 K/UL    ABS. IMM. GRANS. 0.0 0.00 - 0.04 K/UL    DF AUTOMATED     METABOLIC PANEL, COMPREHENSIVE    Collection Time: 10/19/22  1:15 PM   Result Value Ref Range    Sodium 139 136 - 145 mmol/L    Potassium 3.3 (L) 3.5 - 5.5 mmol/L    Chloride 99 (L) 100 - 111 mmol/L    CO2 33 (H) 21 - 32 mmol/L    Anion gap 7 3.0 - 18 mmol/L    Glucose 245 (H) 74 - 99 mg/dL    BUN 16 7.0 - 18 MG/DL    Creatinine 1.00 0.6 - 1.3 MG/DL    BUN/Creatinine ratio 16 12 - 20      eGFR >60 >60 ml/min/1.73m2    Calcium 10.1 8.5 - 10.1 MG/DL    Bilirubin, total 0.5 0.2 - 1.0 MG/DL    ALT (SGPT) 16 13 - 56 U/L    AST (SGOT) 12 10 - 38 U/L    Alk.  phosphatase 68 45 - 117 U/L    Protein, total 8.3 (H) 6.4 - 8.2 g/dL    Albumin 4.1 3.4 - 5.0 g/dL    Globulin 4.2 (H) 2.0 - 4.0 g/dL    A-G Ratio 1.0 0.8 - 1.7     D DIMER    Collection Time: 10/19/22  1:15 PM   Result Value Ref Range    D DIMER 0.57 (H) <0.46 ug/ml(FEU)   EKG, 12 LEAD, SUBSEQUENT    Collection Time: 10/19/22  2:54 PM   Result Value Ref Range    Ventricular Rate 63 BPM    Atrial Rate 63 BPM    P-R Interval 130 ms    QRS Duration 120 ms    Q-T Interval 452 ms    QTC Calculation (Bezet) 462 ms    Calculated P Axis 42 degrees    Calculated R Axis 57 degrees    Calculated T Axis 50 degrees    Diagnosis       Normal sinus rhythm  Right bundle branch block  Abnormal ECG  When compared with ECG of 19-OCT-2022 13:11,  No significant change was found     TROPONIN-HIGH SENSITIVITY    Collection Time: 10/19/22  2:55 PM   Result Value Ref Range    Troponin-High Sensitivity 29 0 - 54 ng/L       Radiologic Studies -   XR CHEST PORT   Final Result      No acute findings in the chest.         CT Results  (Last 48 hours)      None CXR Results  (Last 48 hours)                 10/19/22 1330  XR CHEST PORT Final result    Impression:      No acute findings in the chest.        Narrative:  EXAM: XR CHEST PORT       CLINICAL INDICATION/HISTORY: sob and cp   -Additional: None       COMPARISON: 1/22/2022       TECHNIQUE: Frontal view of the chest       _______________       FINDINGS:       HEART AND MEDIASTINUM: Normal cardiac size and mediastinal contours. LUNGS AND PLEURAL SPACES: No focal pneumonic consolidation, pneumothorax, or   pleural effusion. BONY THORAX AND SOFT TISSUES: No acute osseous abnormality       _______________                   Medications given in the ED-  Medications   methocarbamoL (ROBAXIN) tablet 500 mg (500 mg Oral Given 10/19/22 1632)         Medical Decision Making   I am the first provider for this patient. I reviewed the vital signs, available nursing notes, past medical history, past surgical history, family history and social history. Vital Signs-Reviewed the patient's vital signs. Records Reviewed: Nursing Notes      Procedures:  Procedures    ED Course:  2:18 PM   Initial assessment performed. The patients presenting problems have been discussed, and they are in agreement with the care plan formulated and outlined with them. I have encouraged them to ask questions as they arise throughout their visit. Provider Notes (Medical Decision Making): Evelio Moser is a 70 y.o. female presents with URI symptoms for the past 3 to 4 days, associated chest pain and left upper back pain. No injury or trauma. Her vitals are stable, exam unremarkable other than some mild left thoracic back tenderness, no rash. EKG without ischemic changes x2, white count normal, rest of labs unremarkable and troponin also negative x2. Given her history of COVID-19, D-dimer done which per age-adjusted level was negative. Chest x-ray shows no acute abnormalities. Consistent with URI muscle strain. Recommend Mucinex, over-the-counter medications for symptomatic relief, muscle relaxer for pain and follow-up with PCP or return ED if any worsening or change of symptoms    Diagnosis and Disposition       DISCHARGE NOTE:    Julio Potter  results have been reviewed with her. She has been counseled regarding her diagnosis, treatment, and plan. She verbally conveys understanding and agreement of the signs, symptoms, diagnosis, treatment and prognosis and additionally agrees to follow up as discussed. She also agrees with the care-plan and conveys that all of her questions have been answered. I have also provided discharge instructions for her that include: educational information regarding their diagnosis and treatment, and list of reasons why they would want to return to the ED prior to their follow-up appointment, should her condition change. She has been provided with education for proper emergency department utilization. CLINICAL IMPRESSION:    1. Viral upper respiratory tract infection    2. Muscle strain of left upper back, initial encounter        PLAN:  1. D/C Home  2. Discharge Medication List as of 10/19/2022  4:38 PM        START taking these medications    Details   methocarbamoL (Robaxin-750) 750 mg tablet Take 1 Tablet by mouth three (3) times daily as needed for Muscle Spasm(s). , Normal, Disp-12 Tablet, R-0      guaiFENesin ER (Mucinex) 600 mg ER tablet Take 1 Tablet by mouth two (2) times daily as needed for Congestion. , Normal, Disp-10 Tablet, R-0           CONTINUE these medications which have NOT CHANGED    Details   insulin aspart U-100 (NOVOLOG FLEXPEN U-100 INSULIN) 100 unit/mL inpn 12 Units by SubCUTAneous route Before breakfast, lunch, and dinner., Historical Med      albuterol (PROVENTIL HFA, VENTOLIN HFA, PROAIR HFA) 90 mcg/actuation inhaler Take 2 Puffs by inhalation every four (4) hours as needed for Wheezing., Normal, Disp-1 Inhaler, R-0      inhalational spacing device (E-Z SPACER) 1 Each by Does Not Apply route as needed., Normal, Disp-1 Device, R-0      insulin detemir (LEVEMIR) 100 unit/mL injection 0.15 mL by SubCUTAneous route nightly., Normal, Disp-1 Vial, R-0      levothyroxine (SYNTHROID) 100 mcg tablet Take 100 mcg by mouth Daily (before breakfast). Instructed to take morning of surgery with sip of water., Historical Med      simvastatin (ZOCOR) 40 mg tablet Take 40 mg by mouth nightly. Historical Med, 40 mg      enalapril (VASOTEC) 20 mg tablet Take 20 mg by mouth daily. Instructed to take morning of surgery with sip of water, Historical Med           3. Follow-up Information       Follow up With Specialties Details Why Contact Info    Robert Sinclair MD Internal Medicine Physician Schedule an appointment as soon as possible for a visit   [place] [address] [address]      [facility] EMERGENCY DEPT Emergency Medicine  As needed, If symptoms worsen [address] [id number]  [phone]          _______________________________      Please note that this dictation was completed with [email or web address], the computer voice recognition software. Quite often unanticipated grammatical, syntax, homophones, and other interpretive errors are inadvertently transcribed by the computer software. Please disregard these errors. Please excuse any errors that have escaped final proofreading.

## 2022-10-21 LAB — SARS-COV-2, NAA: NOT DETECTED

## 2022-12-01 NOTE — ED NOTES
..EMR entered and reviewed by Emergency Department nurse manager for the purpose of chart review in the course of performing managerial functions and responsibilities related to performance improvement. Hello,  I called and left a message to let our patient know about her biopsy - no atypia or carcinoma.    Just wanted to copy you to make you aware she had a biopsy and see if this changes your follow up with her.      Initial Treatment Date: 06-  Occupation:  at [employer]   Referred by: Sujey Cerda MD  Primary Care Physician: Sujey Cerda MD  Date informed consent signed:  06-  Visit Number of Current Episode: 2  Length of Visit: 15 min.    Subjective:   Renay is a 31 year old female, , who presents today for continued treatment of low back and tailbone pain. The mid and lower back pain is frequent, mild to severe, achy and tight with occasional nerve complaints radiating into the right posterior buttocks and thigh ending around the knee.  Overall only mild improvement since the initial treatment.    Objective findings   Moderate to severe palpatory tenderness is noted over the mid and lower thoracic spine, bilateral lumbar spine and bilateral sacroiliac joints at the PSIS and PI IS levels.  Moderate grade joint restrictions are noted at the following levels: T8-L3, L5-S1 and at the bilateral sacroiliac joints. Muscle hypertonicity is graded at 3 or moderate and is contributing to restricted mobility in the involved areas. These areas include the hamstrings, anterior pelvic and gluteal musculature as well as the thoracolumbar paraspinals.    No relevant x-rays, CTs or MRIs to review in this case.    Assessment:  Segmental and somatic dysfunction lumbar, thoracic and sacral areas with right lumbar radiculopathy and lumbar facet pain.  Patient responding as anticipated to early care.    Complicating Factors/Co-morbidities:   Lack of any regular stretching or exercise.    Working Diagnoses:      1. Segmental and somatic dysfunction of lumbar region    2. Segmental and somatic dysfunction of thoracic region    3. Segmental and somatic dysfunction of sacral region    4. Right lumbar radiculopathy    5. Lumbar facet joint pain      Treatment today:   All joint restrictions in the thoracic, lumbar and bilateral sacroiliac joints were treated today and the exact levels are listed in the objective  section. These areas were adjusted today utilizing a gentle diversified technique to correct aberrant joint function and reduce scar tissue formation. This procedure was done without incident at the site(s) of restriction.    Unattended interferential current was applied to the thoracic, lumbar and bilateral sacroiliac sites as noted in the objective section for 15 minutes at an intensity level that was comfortable for the patient. This modality was performed to reduce pain and gently decongest tissues to help the patient's condition heal. Patient tolerated the procedure well.    *Patient has an up-to-date, signed, commercial waiver form that is on the file as of visit date June 8, 2021 and is valid through June 8, 2022. Patient understands the electric stimulation, ultrasound therapy and therapeutic exercise treatment are a non-covered service(s) through Medicaid and agrees to be financially responsible for this service.*    Plan:  Continue treatment plan as outlined June 8, 2021.     Patient Instruction/Education:   Patient advised to utilize ice or cold compresses over the involved regions at home to help reduce pain and inflammation as needed. Patient stated understanding of, and was in agreement with, the discussed instructions.    Other treatment options discussed with patient:  Further recommendations will be guided, in part, by the outcome of care. No further recommendations or referrals will be needed unless patient fails to adequately respond to conservative care.

## 2023-11-21 ENCOUNTER — HOSPITAL ENCOUNTER (EMERGENCY)
Facility: HOSPITAL | Age: 73
Discharge: HOME | End: 2023-11-21
Payer: MEDICARE

## 2023-11-21 VITALS
OXYGEN SATURATION: 100 % | WEIGHT: 130 LBS | TEMPERATURE: 98.9 F | RESPIRATION RATE: 18 BRPM | HEIGHT: 63 IN | SYSTOLIC BLOOD PRESSURE: 120 MMHG | DIASTOLIC BLOOD PRESSURE: 54 MMHG | BODY MASS INDEX: 23.04 KG/M2 | HEART RATE: 62 BPM

## 2023-11-21 DIAGNOSIS — K13.0 CHEILITIS: Primary | ICD-10-CM

## 2023-11-21 PROCEDURE — 99283 EMERGENCY DEPT VISIT LOW MDM: CPT

## 2023-11-21 RX ORDER — CEPHALEXIN 500 MG/1
500 CAPSULE ORAL 4 TIMES DAILY
Qty: 28 CAPSULE | Refills: 0 | Status: SHIPPED | OUTPATIENT
Start: 2023-11-21 | End: 2023-11-28

## 2023-11-21 RX ORDER — VALACYCLOVIR HYDROCHLORIDE 1 G/1
1000 TABLET, FILM COATED ORAL 2 TIMES DAILY
Qty: 14 TABLET | Refills: 0 | Status: SHIPPED | OUTPATIENT
Start: 2023-11-21 | End: 2023-11-28

## 2023-11-21 ASSESSMENT — PAIN - FUNCTIONAL ASSESSMENT: PAIN_FUNCTIONAL_ASSESSMENT: NONE - DENIES PAIN

## 2023-11-21 NOTE — ED TRIAGE NOTES
Pt ambulatory to triage with daughter c/o blisters on lips x 2 months. Tried vaseline, vit E, and vicks.

## 2023-11-21 NOTE — ED PROVIDER NOTES
[facility] EMERGENCY DEPT  EMERGENCY DEPARTMENT ENCOUNTER       Pt Name: Ashlyn Record  MRN: [id number]  [address] 1950  Date of evaluation: 2023  PCP: Kevin Cameron MD  Note Started: 6:12 PM 23     CHIEF COMPLAINT       Chief Complaint   Patient presents with    Blister     On lips        HISTORY OF PRESENT ILLNESS: 1 or more elements      History From: Patient  HPI Limitations: None  Chronic Conditions: chronic low back pain, DM, HTN, HLP, renal stones  Social Determinants affecting Dx or Tx: none      Ashlyn Record is a 68 y.o. female who presents to ED c/o blister to lips. Worse in corner of lips. Pt notes sxs intermittent x one month. No relief with topical lip balm. No fever, chills, oral lesions, cough, sore throat. Nursing Notes were all reviewed and agreed with or any disagreements were addressed in the HPI. PAST HISTORY     Past Medical History:  Past Medical History:   Diagnosis Date    Chronic pain     low back pain    Diabetes ([address])     type II, was on oral agents for a while    Hypercholesteremia     Hypertension     Ill-defined condition     kidny stones    Thyroid disease        Past Surgical History:  Past Surgical History:   Procedure Laterality Date    GYN      tubal ligation    LITHOTRIPSY         Family History:  Family History   Problem Relation Age of Onset    Cancer Mother        Social History:  Social History     Socioeconomic History    Marital status:    Tobacco Use    Smoking status: Former     Types: Cigarettes     Quit date: 1998     Years since quittin.4    Smokeless tobacco: Never   Substance and Sexual Activity    Alcohol use: No    Drug use: No       Allergies:  No Known Allergies    CURRENT MEDICATIONS      No current facility-administered medications for this encounter.      Current Outpatient Medications   Medication Sig Dispense Refill    valACYclovir (VALTREX) 1 g tablet Take 1 tablet by mouth 2 times daily for 7 days 14 tablet 0

## 2023-11-29 ENCOUNTER — HOSPITAL ENCOUNTER (EMERGENCY)
Facility: HOSPITAL | Age: 73
Discharge: HOME | End: 2023-11-29
Attending: EMERGENCY MEDICINE
Payer: MEDICARE

## 2023-11-29 VITALS
RESPIRATION RATE: 16 BRPM | TEMPERATURE: 97.7 F | BODY MASS INDEX: 21.79 KG/M2 | DIASTOLIC BLOOD PRESSURE: 67 MMHG | HEIGHT: 63 IN | SYSTOLIC BLOOD PRESSURE: 135 MMHG | WEIGHT: 123 LBS | HEART RATE: 68 BPM | OXYGEN SATURATION: 100 %

## 2023-11-29 DIAGNOSIS — M54.50 ACUTE EXACERBATION OF CHRONIC LOW BACK PAIN: Primary | ICD-10-CM

## 2023-11-29 DIAGNOSIS — G89.29 ACUTE EXACERBATION OF CHRONIC LOW BACK PAIN: Primary | ICD-10-CM

## 2023-11-29 LAB
APPEARANCE UR: CLEAR
BILIRUB UR QL: NEGATIVE
COLOR UR: YELLOW
GLUCOSE UR STRIP.AUTO-MCNC: 500 MG/DL
HGB UR QL STRIP: NEGATIVE
KETONES UR QL STRIP.AUTO: NEGATIVE MG/DL
LEUKOCYTE ESTERASE UR QL STRIP.AUTO: NEGATIVE
NITRITE UR QL STRIP.AUTO: NEGATIVE
PH UR STRIP: 5 (ref 5–8)
PROT UR STRIP-MCNC: NEGATIVE MG/DL
SP GR UR REFRACTOMETRY: 1.01 (ref 1–1.03)
UROBILINOGEN UR QL STRIP.AUTO: 0.2 EU/DL (ref 0.2–1)

## 2023-11-29 PROCEDURE — 6370000000 HC RX 637 (ALT 250 FOR IP): Performed by: EMERGENCY MEDICINE

## 2023-11-29 PROCEDURE — 81003 URINALYSIS AUTO W/O SCOPE: CPT

## 2023-11-29 PROCEDURE — 99284 EMERGENCY DEPT VISIT MOD MDM: CPT

## 2023-11-29 PROCEDURE — 96372 THER/PROPH/DIAG INJ SC/IM: CPT

## 2023-11-29 PROCEDURE — 6360000002 HC RX W HCPCS: Performed by: EMERGENCY MEDICINE

## 2023-11-29 RX ORDER — KETOROLAC TROMETHAMINE 15 MG/ML
15 INJECTION, SOLUTION INTRAMUSCULAR; INTRAVENOUS
Status: COMPLETED | OUTPATIENT
Start: 2023-11-29 | End: 2023-11-29

## 2023-11-29 RX ORDER — LIDOCAINE 50 MG/G
1 PATCH TOPICAL DAILY
Qty: 30 PATCH | Refills: 0 | Status: SHIPPED | OUTPATIENT
Start: 2023-11-29

## 2023-11-29 RX ORDER — LIDOCAINE 4 G/G
1 PATCH TOPICAL
Status: DISCONTINUED | OUTPATIENT
Start: 2023-11-29 | End: 2023-11-29 | Stop reason: HOSPADM

## 2023-11-29 RX ORDER — METHOCARBAMOL 500 MG/1
500 TABLET, FILM COATED ORAL 4 TIMES DAILY
Qty: 12 TABLET | Refills: 0 | Status: SHIPPED | OUTPATIENT
Start: 2023-11-29 | End: 2023-12-02

## 2023-11-29 RX ADMIN — KETOROLAC TROMETHAMINE 15 MG: 15 INJECTION, SOLUTION INTRAMUSCULAR; INTRAVENOUS at 15:43

## 2023-11-29 ASSESSMENT — PAIN SCALES - GENERAL
PAINLEVEL_OUTOF10: 10
PAINLEVEL_OUTOF10: 6

## 2023-11-29 ASSESSMENT — PAIN DESCRIPTION - LOCATION: LOCATION: BACK

## 2023-11-29 ASSESSMENT — PAIN DESCRIPTION - ORIENTATION: ORIENTATION: LOWER

## 2023-11-29 ASSESSMENT — PAIN - FUNCTIONAL ASSESSMENT: PAIN_FUNCTIONAL_ASSESSMENT: 0-10

## 2023-11-29 NOTE — ED TRIAGE NOTES
Pt ambulatory to ed with CC of back pain. Pt reports she was diagnosed with herniated disk a few years back with today being unbearable.

## 2023-11-29 NOTE — ED NOTES
The following labs were labeled with appropriate pt sticker and tubed to lab:     [] Blue     [] Lavender   [] on ice  [] Green/yellow  [] Green/black [] on ice  [] Judythe Maxcy  [] on ice  [] Yellow  [] Red  [] Pink  [] Type/ Screen  [] ABG  [] VBG    [] COVID-19 swab    [] Rapid  [] PCR  [] Flu swab  [] Peds Viral Panel     [x] Urine Sample  [] Fecal Sample  [] Pelvic Cultures  [] Blood Cultures  [] X 2  [] STREP Cultures  [] Wound Cultures       Kayren Lundborg, RN  11/29/23 [id number]

## 2023-12-21 ENCOUNTER — HOSPITAL ENCOUNTER (EMERGENCY)
Facility: HOSPITAL | Age: 73
Discharge: HOME | End: 2023-12-22
Attending: EMERGENCY MEDICINE
Payer: MEDICARE

## 2023-12-21 DIAGNOSIS — R42 DIZZINESS: Primary | ICD-10-CM

## 2023-12-21 DIAGNOSIS — E87.6 HYPOKALEMIA: ICD-10-CM

## 2023-12-21 PROCEDURE — 99285 EMERGENCY DEPT VISIT HI MDM: CPT

## 2023-12-21 PROCEDURE — 94762 N-INVAS EAR/PLS OXIMTRY CONT: CPT

## 2023-12-21 PROCEDURE — 96361 HYDRATE IV INFUSION ADD-ON: CPT

## 2023-12-21 PROCEDURE — 96374 THER/PROPH/DIAG INJ IV PUSH: CPT

## 2023-12-21 ASSESSMENT — PAIN - FUNCTIONAL ASSESSMENT: PAIN_FUNCTIONAL_ASSESSMENT: NONE - DENIES PAIN

## 2023-12-22 ENCOUNTER — APPOINTMENT (OUTPATIENT)
Facility: HOSPITAL | Age: 73
End: 2023-12-22
Payer: MEDICARE

## 2023-12-22 VITALS
HEART RATE: 55 BPM | OXYGEN SATURATION: 97 % | DIASTOLIC BLOOD PRESSURE: 56 MMHG | HEIGHT: 63 IN | WEIGHT: 120 LBS | RESPIRATION RATE: 14 BRPM | SYSTOLIC BLOOD PRESSURE: 112 MMHG | BODY MASS INDEX: 21.26 KG/M2 | TEMPERATURE: 97.8 F

## 2023-12-22 LAB
ALBUMIN SERPL-MCNC: 4 G/DL (ref 3.4–5)
ALBUMIN/GLOB SERPL: 0.8 (ref 0.8–1.7)
ALP SERPL-CCNC: 62 U/L (ref 45–117)
ALT SERPL-CCNC: 15 U/L (ref 13–56)
ANION GAP SERPL CALC-SCNC: 4 MMOL/L (ref 3–18)
AST SERPL-CCNC: 18 U/L (ref 10–38)
BASOPHILS # BLD: 0.1 K/UL (ref 0–0.1)
BASOPHILS NFR BLD: 1 % (ref 0–2)
BILIRUB SERPL-MCNC: 0.4 MG/DL (ref 0.2–1)
BUN SERPL-MCNC: 19 MG/DL (ref 7–18)
BUN/CREAT SERPL: 21 (ref 12–20)
CALCIUM SERPL-MCNC: 9.7 MG/DL (ref 8.5–10.1)
CHLORIDE SERPL-SCNC: 100 MMOL/L (ref 100–111)
CO2 SERPL-SCNC: 34 MMOL/L (ref 21–32)
CREAT SERPL-MCNC: 0.91 MG/DL (ref 0.6–1.3)
DIFFERENTIAL METHOD BLD: ABNORMAL
EOSINOPHIL # BLD: 0.2 K/UL (ref 0–0.4)
EOSINOPHIL NFR BLD: 3 % (ref 0–5)
ERYTHROCYTE [DISTWIDTH] IN BLOOD BY AUTOMATED COUNT: 12.7 % (ref 11.6–14.5)
GLOBULIN SER CALC-MCNC: 4.8 G/DL (ref 2–4)
GLUCOSE BLD STRIP.AUTO-MCNC: 120 MG/DL (ref 70–110)
GLUCOSE SERPL-MCNC: 122 MG/DL (ref 74–99)
HCT VFR BLD AUTO: 43.5 % (ref 35–45)
HGB BLD-MCNC: 14.5 G/DL (ref 12–16)
IMM GRANULOCYTES # BLD AUTO: 0 K/UL (ref 0–0.04)
IMM GRANULOCYTES NFR BLD AUTO: 0 % (ref 0–0.5)
LYMPHOCYTES # BLD: 1.7 K/UL (ref 0.9–3.6)
LYMPHOCYTES NFR BLD: 20 % (ref 21–52)
MCH RBC QN AUTO: 30.3 PG (ref 24–34)
MCHC RBC AUTO-ENTMCNC: 33.3 G/DL (ref 31–37)
MCV RBC AUTO: 91 FL (ref 78–100)
MONOCYTES # BLD: 0.7 K/UL (ref 0.05–1.2)
MONOCYTES NFR BLD: 8 % (ref 3–10)
NEUTS SEG # BLD: 6.1 K/UL (ref 1.8–8)
NEUTS SEG NFR BLD: 69 % (ref 40–73)
NRBC # BLD: 0 K/UL (ref 0–0.01)
NRBC BLD-RTO: 0 PER 100 WBC
PLATELET # BLD AUTO: 239 K/UL (ref 135–420)
PMV BLD AUTO: 9.6 FL (ref 9.2–11.8)
POTASSIUM SERPL-SCNC: 3 MMOL/L (ref 3.5–5.5)
PROT SERPL-MCNC: 8.8 G/DL (ref 6.4–8.2)
RBC # BLD AUTO: 4.78 M/UL (ref 4.2–5.3)
SODIUM SERPL-SCNC: 138 MMOL/L (ref 136–145)
TROPONIN I SERPL HS-MCNC: 32 NG/L (ref 0–54)
TROPONIN I SERPL HS-MCNC: 33 NG/L (ref 0–54)
WBC # BLD AUTO: 8.8 K/UL (ref 4.6–13.2)

## 2023-12-22 PROCEDURE — 85025 COMPLETE CBC W/AUTO DIFF WBC: CPT

## 2023-12-22 PROCEDURE — A4216 STERILE WATER/SALINE, 10 ML: HCPCS | Performed by: EMERGENCY MEDICINE

## 2023-12-22 PROCEDURE — 2580000003 HC RX 258: Performed by: EMERGENCY MEDICINE

## 2023-12-22 PROCEDURE — 71045 X-RAY EXAM CHEST 1 VIEW: CPT

## 2023-12-22 PROCEDURE — 80053 COMPREHEN METABOLIC PANEL: CPT

## 2023-12-22 PROCEDURE — 6370000000 HC RX 637 (ALT 250 FOR IP): Performed by: EMERGENCY MEDICINE

## 2023-12-22 PROCEDURE — 84484 ASSAY OF TROPONIN QUANT: CPT

## 2023-12-22 PROCEDURE — 82962 GLUCOSE BLOOD TEST: CPT

## 2023-12-22 PROCEDURE — 2500000003 HC RX 250 WO HCPCS: Performed by: EMERGENCY MEDICINE

## 2023-12-22 RX ORDER — POTASSIUM CHLORIDE 20 MEQ/1
20 TABLET, EXTENDED RELEASE ORAL
Status: COMPLETED | OUTPATIENT
Start: 2023-12-22 | End: 2023-12-22

## 2023-12-22 RX ORDER — POTASSIUM CHLORIDE 20 MEQ/1
10 TABLET, EXTENDED RELEASE ORAL 2 TIMES DAILY
Qty: 7 TABLET | Refills: 0 | Status: SHIPPED | OUTPATIENT
Start: 2023-12-22 | End: 2023-12-29

## 2023-12-22 RX ORDER — 0.9 % SODIUM CHLORIDE 0.9 %
500 INTRAVENOUS SOLUTION INTRAVENOUS ONCE
Status: COMPLETED | OUTPATIENT
Start: 2023-12-22 | End: 2023-12-22

## 2023-12-22 RX ADMIN — POTASSIUM CHLORIDE 20 MEQ: 1500 TABLET, EXTENDED RELEASE ORAL at 06:16

## 2023-12-22 RX ADMIN — POTASSIUM BICARBONATE 40 MEQ: 782 TABLET, EFFERVESCENT ORAL at 04:55

## 2023-12-22 RX ADMIN — SODIUM CHLORIDE 500 ML: 9 INJECTION, SOLUTION INTRAVENOUS at 04:45

## 2023-12-22 RX ADMIN — FAMOTIDINE 20 MG: 10 INJECTION, SOLUTION INTRAVENOUS at 04:50

## 2023-12-22 NOTE — ED NOTES
Pur wick placed on the patient with clear yellow urine obtained from the patient.  Pt able to talk in full sentences; family at the bedside

## 2023-12-22 NOTE — ED PROVIDER NOTES
EMERGENCY DEPARTMENT HISTORY AND PHYSICAL EXAM    Date: 12/21/2023  Patient Name: Dianna Joseph    History of Presenting Illness     Chief Complaint   Patient presents with    Dizziness         History Provided By: Patient    Additional History (Context):   3:55 AM EST  Dianna Joseph is a 73 y.o. female with PMHX of high blood pressure, diabetes, high cholesterol, ureteral stone, Ménière's disease, chronic pain, thyroid disease disease who presents to the emergency department C/O dizziness nausea and vertigo  Paramedics were called to patient's house for complaints of nausea and vomiting.  She states symptoms came on an insidious onset roughly an hour before arrival.  She states she has a history of vertigo and symptoms tonight are exactly like her previous vertigo.  She is prescribed meclizine and has not used the medication.  Paramedics reported blood sugar 142 with a rate score of 0.  Heart rate borderline bradycardia slightly elevated blood pressure in the ambulance.  Paramedics continue to transport S patient patient in bed 3    Social History  Quit smoking many years ago.  Details listed below.  Denies alcohol or drug use.  No known toxic exposures    Family History  Mother with cancer documented below.    PCP: Kehinde Carlin MD    No current facility-administered medications for this encounter.     Current Outpatient Medications   Medication Sig Dispense Refill    potassium chloride (KLOR-CON M) 20 MEQ extended release tablet Take 0.5 tablets by mouth 2 times daily for 7 days 7 tablet 0    lidocaine (LIDODERM) 5 % Place 1 patch onto the skin daily 12 hours on, 12 hours off. 30 patch 0    albuterol sulfate HFA (PROVENTIL;VENTOLIN;PROAIR) 108 (90 Base) MCG/ACT inhaler Inhale 2 puffs into the lungs every 4 hours as needed      enalapril (VASOTEC) 20 MG tablet Take 20 mg by mouth daily      insulin aspart (NOVOLOG) 100 UNIT/ML injection pen Inject 12 Units into the skin 3 times daily (before meals)       AM EST: Initial assessment performed. The patients presenting problems have been discussed, and they are in agreement with the care plan formulated and outlined with them. I have encouraged them to ask questions as they arise throughout their visit. Diagnosis and Disposition       DISCHARGE NOTE:  ***  Dianna Joseph's  results have been reviewed with her. She has been counseled regarding her diagnosis, treatment, and plan. She verbally conveys understanding and agreement of the signs, symptoms, diagnosis, treatment and prognosis and additionally agrees to follow up as discussed. She also agrees with the care-plan and conveys that all of her questions have been answered. I have also provided discharge instructions for her that include: educational information regarding their diagnosis and treatment, and list of reasons why they would want to return to the ED prior to their follow-up appointment, should her condition change. She has been provided with education for proper emergency department utilization. CLINICAL IMPRESSION:    1. Dizziness    2. Hypokalemia        PLAN:  1. D/C Home  2.       Medication List        START taking these medications      potassium chloride 20 MEQ extended release tablet  Commonly known as: KLOR-CON M  Take 0.5 tablets by mouth 2 times daily for 7 days            ASK your doctor about these medications      albuterol sulfate  (90 Base) MCG/ACT inhaler  Commonly known as: PROVENTIL;VENTOLIN;PROAIR     enalapril 20 MG tablet  Commonly known as: VASOTEC     insulin aspart 100 UNIT/ML injection pen  Commonly known as: NovoLOG     insulin detemir 100 UNIT/ML injection vial  Commonly known as: LEVEMIR     levothyroxine 100 MCG tablet  Commonly known as: SYNTHROID     lidocaine 5 %  Commonly known as: LIDODERM  Place 1 patch onto the skin daily 12 hours on, 12 hours off.     simvastatin 40 MG tablet  Commonly known as: ZOCOR               Where to Get Your Medications

## 2023-12-22 NOTE — ED TRIAGE NOTES
Pt arrives alert + oriented c/o dizziness + nausea x 1 hour. Pt has a hx of vertigo, reports this feels the same hasn't taken any vertigo medicine.

## 2023-12-27 LAB
EKG ATRIAL RATE: 51 BPM
EKG DIAGNOSIS: NORMAL
EKG P AXIS: 56 DEGREES
EKG P-R INTERVAL: 144 MS
EKG Q-T INTERVAL: 504 MS
EKG QRS DURATION: 118 MS
EKG QTC CALCULATION (BAZETT): 464 MS
EKG R AXIS: 74 DEGREES
EKG T AXIS: 72 DEGREES
EKG VENTRICULAR RATE: 51 BPM

## 2024-11-22 ENCOUNTER — HOSPITAL ENCOUNTER (INPATIENT)
Facility: HOSPITAL | Age: 74
LOS: 1 days | Discharge: HOME | DRG: 177 | End: 2024-11-23
Attending: EMERGENCY MEDICINE | Admitting: FAMILY MEDICINE
Payer: MEDICARE

## 2024-11-22 ENCOUNTER — APPOINTMENT (OUTPATIENT)
Facility: HOSPITAL | Age: 74
DRG: 177 | End: 2024-11-22
Payer: MEDICARE

## 2024-11-22 ENCOUNTER — ANESTHESIA EVENT (OUTPATIENT)
Facility: HOSPITAL | Age: 74
End: 2024-11-22
Payer: MEDICARE

## 2024-11-22 ENCOUNTER — ANESTHESIA (OUTPATIENT)
Facility: HOSPITAL | Age: 74
End: 2024-11-22
Payer: MEDICARE

## 2024-11-22 PROBLEM — G93.41 ACUTE METABOLIC ENCEPHALOPATHY: Status: ACTIVE | Noted: 2024-11-22

## 2024-11-22 PROBLEM — J69.0 ASPIRATION PNEUMONIA DUE TO INHALATION OF VOMITUS (HCC): Status: ACTIVE | Noted: 2024-11-22

## 2024-11-22 PROBLEM — K22.0 ACHALASIA OF ESOPHAGUS: Status: ACTIVE | Noted: 2024-11-22

## 2024-11-22 PROBLEM — K22.2 ESOPHAGEAL OBSTRUCTION: Status: ACTIVE | Noted: 2024-11-22

## 2024-11-22 PROCEDURE — 6360000002 HC RX W HCPCS: Performed by: NURSE ANESTHETIST, CERTIFIED REGISTERED

## 2024-11-22 RX ORDER — LIDOCAINE HYDROCHLORIDE 20 MG/ML
INJECTION, SOLUTION EPIDURAL; INFILTRATION; INTRACAUDAL; PERINEURAL
Status: DISCONTINUED | OUTPATIENT
Start: 2024-11-22 | End: 2024-11-22 | Stop reason: SDUPTHER

## 2024-11-22 RX ORDER — PROPOFOL 10 MG/ML
INJECTION, EMULSION INTRAVENOUS
Status: DISCONTINUED | OUTPATIENT
Start: 2024-11-22 | End: 2024-11-22 | Stop reason: SDUPTHER

## 2024-11-22 RX ADMIN — PROPOFOL 20 MG: 10 INJECTION, EMULSION INTRAVENOUS at 16:54

## 2024-11-22 RX ADMIN — PROPOFOL 50 MG: 10 INJECTION, EMULSION INTRAVENOUS at 16:41

## 2024-11-22 RX ADMIN — PROPOFOL 20 MG: 10 INJECTION, EMULSION INTRAVENOUS at 16:42

## 2024-11-22 RX ADMIN — PROPOFOL 20 MG: 10 INJECTION, EMULSION INTRAVENOUS at 16:50

## 2024-11-22 RX ADMIN — LIDOCAINE HYDROCHLORIDE 80 MG: 20 INJECTION, SOLUTION EPIDURAL; INFILTRATION; INTRACAUDAL; PERINEURAL at 16:41

## 2024-11-22 RX ADMIN — PROPOFOL 20 MG: 10 INJECTION, EMULSION INTRAVENOUS at 17:00

## 2024-11-22 RX ADMIN — PROPOFOL 20 MG: 10 INJECTION, EMULSION INTRAVENOUS at 16:49

## 2024-11-22 RX ADMIN — PROPOFOL 20 MG: 10 INJECTION, EMULSION INTRAVENOUS at 17:05

## 2024-11-22 RX ADMIN — PROPOFOL 20 MG: 10 INJECTION, EMULSION INTRAVENOUS at 16:47

## 2024-11-22 RX ADMIN — PROPOFOL 20 MG: 10 INJECTION, EMULSION INTRAVENOUS at 17:02

## 2024-11-22 RX ADMIN — PROPOFOL 20 MG: 10 INJECTION, EMULSION INTRAVENOUS at 16:43

## 2024-11-22 RX ADMIN — PROPOFOL 20 MG: 10 INJECTION, EMULSION INTRAVENOUS at 16:52

## 2024-11-22 RX ADMIN — PROPOFOL 20 MG: 10 INJECTION, EMULSION INTRAVENOUS at 16:56

## 2024-11-22 RX ADMIN — PROPOFOL 20 MG: 10 INJECTION, EMULSION INTRAVENOUS at 16:45

## 2024-11-22 RX ADMIN — PROPOFOL 20 MG: 10 INJECTION, EMULSION INTRAVENOUS at 16:58

## 2024-11-22 ASSESSMENT — PAIN - FUNCTIONAL ASSESSMENT
PAIN_FUNCTIONAL_ASSESSMENT: ADULT NONVERBAL PAIN SCALE (NPVS)
PAIN_FUNCTIONAL_ASSESSMENT: ADULT NONVERBAL PAIN SCALE (NPVS)
PAIN_FUNCTIONAL_ASSESSMENT: 0-10
PAIN_FUNCTIONAL_ASSESSMENT: ADULT NONVERBAL PAIN SCALE (NPVS)

## 2024-11-22 ASSESSMENT — PAIN SCALES - GENERAL
PAINLEVEL_OUTOF10: 0

## 2024-11-22 NOTE — H&P
History & Physical    Patient: Dianna Joseph MRN: [id number]  CSN: [id number]    YOB: 1950  Age: 74 y.o.  Sex: female      DOA: 2024    Chief Complaint:   Chief Complaint   Patient presents with    Dizziness    Chills       Active Hospital Problems    Diagnosis Date Noted    Aspiration pneumonia due to inhalation of vomitus (HCC) [J69.0] 2024     Priority: High    Acute metabolic encephalopathy [G93.41] 2024     Priority: High    Achalasia of esophagus [K22.0] 2024    DM w/o complication type II (HCC) [E11.9] 2017    HTN (hypertension) [I10] 2017          HPI:     Dianna Joseph is a 74 y.o.  female with past medical history of achalasia, hypertension, diabetes mellitus on Levemir and Jardiance who was in usual state of health until earlier last night when she woke up at with feeling warm, flushed and totally confused.  Patient is fully alert and oriented and fully functional at baseline but yesterday at night she was getting confused.  Family denied any fever or chills.  Family and patient denied any cough or shortness of breath.  Denied any recent history of travel.  On arrival to the ER she was found to have pneumonia with dilated esophagus and debris's in the lower esophagus.    Past Medical History:   Diagnosis Date    Chronic pain     low back pain    Diabetes (HCC)     type II, was on oral agents for a while    Hypercholesteremia     Hypertension     Ill-defined condition     kidny stones    Thyroid disease        Past Surgical History:   Procedure Laterality Date    GYN  1977    tubal ligation    LITHOTRIPSY         Family History   Problem Relation Age of Onset    Cancer Mother        Social History     Socioeconomic History    Marital status:    Tobacco Use    Smoking status: Former     Current packs/day: 0.00     Types: Cigarettes     Quit date: 1998     Years since quittin.4    Smokeless tobacco: Never   Vaping Use    Vaping status:  were spent on the care of this patient today,  This time also includes physician non-face-to-face service time visit on the date of service such as  Preparing to see the patient (eg, review of tests)  Obtaining and/or reviewing separately obtained history  Performing a medically necessary appropriate examination and/or evaluation  Counseling and educating the patient/family/caregiver  Ordering medications, tests, or procedures  Referring and communicating with other health care professionals as needed  Documenting clinical information in the electronic or other health record  Independently interpreting results (not reported separately) and communicating results to the patient/family/caregiver  Care coordination and discharge planning with Case Management.          Dear patient, if you are reviewing this note and have a question regarding the medical terminology, please bring it with you to your next PCP visit.  Medical notes are meant to be a communication between medical professionals.  Additionally, portion of this note were created using voice recognition function, syntax and phonetic over may have escaped proofreading.    Dena Fernandes MD    11/22/2024 8:09 AM

## 2024-11-22 NOTE — OP NOTE
Operative Note  .EGD Procedure Note               NAME:  Dianna Joseph   :   1950   MRN:   [id number]     Date/Time:  2024 5:08 PM    Esophagogastroduodenoscopy (EGD) Procedure Note    :  Stan Puri MD, FACP    Staff: Circulator: Lisa Soni RN  Endoscopy Technician: Teresita Shaw CNA     Implants: NA    Referring Provider:  Tamanna Coleman MD    Anethesia/Sedation:  MAC anesthesia    Procedure Details     After infom consent was obtained for the procedure, with all risks and benefits of procedure explained the patient was taken to the endoscopy suite and placed in the left lateral decubitus position.  Following sequential administration of sedation as per above, the [id number] gastroscope was inserted into the mouth and advanced under direct vision to third portion of the duodenum.  A careful inspection was made as the gastroscope was withdrawn, including a retroflexed view of the proximal stomach; findings and interventions are described below.      Findings:  Esophagus:The esophageal lumen was noted with fluid filled mixture of food and debris filling up to the upper segment just below the UES. The fluid suctioned and aspirated with total clearance of the esophageal lumen.  The GE junction allowed passage of the upper scope snugly. The junction was dilated using the TTS, CRE balloon starting at 15 mm to 18 mm; followed by 18 mm to 20 mm. Post dilation mucosa was intact.  The esophageal lumen was slightly dilated.    Stomach:normal   Duodenum/jejunum:normal      Therapies:  As noted above;   - Food bolus/debris was removed via suctioning and irrigation method  - The GEJ was balloon dilated to 20 mm    Specimens:  * No specimens in log *           EBL: None    Complications:   None; patient tolerated the procedure well.           Impression:    FB in esophagus; s/p clearance  Dilated esophagus  S/P Balloon dilation to 20 mm  Poor/abnormal esophageal clearance    Etiology of

## 2024-11-22 NOTE — CONSULTS
.  GI CONSULTATION NOTE      NAME:  Dianna Joseph  :   1950   MRN:   [id number]       Referring Physician: ED Physician    Consult Date: 2024     Reason for GI Consult: Suspected esophageal FB/Obstruction    IMPRESSION:   In summary this is a 74 y.o female with history of DM2, HTN,HPL, Chronic low back,Kidney stones, Esophageal dysmotility syndrome with dilation and impaired motility ( abnormal high resolution manometry with documented frequent failed peristalsis and associated incomplete bolus clearance), presenting with altered mental status, and findings of possible esophageal dilation with FB, and associated aspiration pneumonia.  Etiology of an esophageal dilation and dysmotility is not clear and there is no manometric findings to support Achalasia.  Given current presentation, upper endoscopic evaluation is warranted.  Patient will require outpatient investigation to include a repeat high resolution manometry.  In the meantime, EGD evaluation and possible empiric therapy with calcium channel blocking agent to achieve esophageal smooth muscle relaxation may be useful.  There may be some benefit from a prokinetic agent such as Reglan.      RECOMMENDATION:   IV hydration  Urgent upper endoscopy  Additional recommendation to be based upon findings at endoscopy  Patient may need repeat high resolution manometry to determine presence of Achalasia    History of Present Illness:  Patient is a 74 y.o. who is seen in consultation at the request of ED physician for further evaluation of dilated esophagus with possible FB and suspected achalasia. This patient's was in her usual health until early morning hours on the day of admission when she awoke from sleep, was a bit confused and complaining of dizziness and nausea; which prompted her family to bring her to the ER where she documented with possible aspiration pneumonia and an abnormal CT scan finding with dilated esophagus and incidental findings of

## 2024-11-22 NOTE — INTERVAL H&P NOTE
Update History & Physical    The patient's History and Physical of November 22, 2024 was reviewed with the patient and I examined the patient. There was no change. The surgical site was confirmed by the patient and me.     Plan: The risks, benefits, expected outcome, and alternative to the recommended procedure have been discussed with the patient. Patient understands and wants to proceed with the procedure.     Electronically signed by Stan Puri MD on 11/22/2024 at 4:36 PM

## 2024-11-22 NOTE — ACP (ADVANCE CARE PLANNING)
[facility] Hospitalist Service  At the heart of better care     Advance Care Planning   Admit Date:  2024  1:17 AM   Name:  Dianna Joseph   Age:  74 y.o.  Sex:  female  :  1950   MRN:  [id number]   Room:  ENDO/PL    Dianna Joseph has capacity to make her own decisions:   Yes    If pt unable to make decisions, POA/surrogate decision maker:  Daughter    Other people present:   Daughter    Patient / surrogate decision-maker directed code status:  Full Code    Other ACP topics discussed, if applicable:   None    Patient or surrogate consented to discussion of the current conditions, workup, management plans, prognosis, and the risk for further deterioration.  Aggregate face-to-facetime, 17 minutes was spent discussing end-of-life care planning with patient/family and/or Power of . Discussed CPR, Intubation, treatment goals, and Quality of life/Intensity of care.    Signed:  Dena Fernandes MD

## 2024-11-22 NOTE — H&P (VIEW-ONLY)
.  GI CONSULTATION NOTE      NAME:  Dianna Joseph  :   1950   MRN:   [id number]       Referring Physician: ED Physician    Consult Date: 2024     Reason for GI Consult: Suspected esophageal FB/Obstruction    IMPRESSION:   In summary this is a 74 y.o female with history of DM2, HTN,HPL, Chronic low back,Kidney stones, Esophageal dysmotility syndrome with dilation and impaired motility ( abnormal high resolution manometry with documented frequent failed peristalsis and associated incomplete bolus clearance), presenting with altered mental status, and findings of possible esophageal dilation with FB, and associated aspiration pneumonia.  Etiology of an esophageal dilation and dysmotility is not clear and there is no manometric findings to support Achalasia.  Given current presentation, upper endoscopic evaluation is warranted.  Patient will require outpatient investigation to include a repeat high resolution manometry.  In the meantime, EGD evaluation and possible empiric therapy with calcium channel blocking agent to achieve esophageal smooth muscle relaxation may be useful.  There may be some benefit from a prokinetic agent such as Reglan.      RECOMMENDATION:   IV hydration  Urgent upper endoscopy  Additional recommendation to be based upon findings at endoscopy  Patient may need repeat high resolution manometry to determine presence of Achalasia    History of Present Illness:  Patient is a 74 y.o. who is seen in consultation at the request of ED physician for further evaluation of dilated esophagus with possible FB and suspected achalasia. This patient's was in her usual health until early morning hours on the day of admission when she awoke from sleep, was a bit confused and complaining of dizziness and nausea; which prompted her family to bring her to the ER where she documented with possible aspiration pneumonia and an abnormal CT scan finding with dilated esophagus and incidental findings of

## 2024-11-22 NOTE — ANESTHESIA PRE PROCEDURE
Department of Anesthesiology  Preprocedure Note       Name:  Dianna Joseph   Age:  74 y.o.  :  1950                                          MRN:  [id number]         Date:  2024      Surgeon: Surgeon(s):  Stan Puri MD    Procedure: Procedure(s):  ESOPHAGOGASTRODUODENOSCOPY    Medications prior to admission:   Prior to Admission medications    Medication Sig Start Date End Date Taking? Authorizing Provider   potassium chloride (KLOR-CON M) 20 MEQ extended release tablet Take 0.5 tablets by mouth 2 times daily for 7 days 23  Ruel Nina MD   lidocaine (LIDODERM) 5 % Place 1 patch onto the skin daily 12 hours on, 12 hours off. 23   Kathy Obrien PA-C   albuterol sulfate HFA (PROVENTIL;VENTOLIN;PROAIR) 108 (90 Base) MCG/ACT inhaler Inhale 2 puffs into the lungs every 4 hours as needed 19   Automatic Reconciliation, Ar   enalapril (VASOTEC) 20 MG tablet Take 20 mg by mouth daily    Automatic Reconciliation, Ar   insulin aspart (NOVOLOG) 100 UNIT/ML injection pen Inject 12 Units into the skin 3 times daily (before meals)    Automatic Reconciliation, Ar   insulin detemir (LEVEMIR) 100 UNIT/ML injection vial Inject 15 Units into the skin 17   Automatic Reconciliation, Ar   levothyroxine (SYNTHROID) 100 MCG tablet Take 100 mcg by mouth every morning (before breakfast)    Automatic Reconciliation, Ar   simvastatin (ZOCOR) 40 MG tablet Take 40 mg by mouth    Automatic Reconciliation, Ar       Current medications:    Current Facility-Administered Medications   Medication Dose Route Frequency Provider Last Rate Last Admin   • ampicillin-sulbactam (UNASYN) 3,000 mg in sodium chloride 0.9 % 100 mL IVPB (mini-bag)  3,000 mg IntraVENous Q6H Ketan Pineda MD   Stopped at 24 0942   • sodium chloride flush 0.9 % injection 5-40 mL  5-40 mL IntraVENous 2 times per day Loraine Erickson MD   10 mL at 24 0923   • sodium chloride flush 0.9 % injection 5-40

## 2024-11-22 NOTE — ED PROVIDER NOTES
this note:    US GALLBLADDER RUQ   Final Result   Gallbladder sludge and nonshadowing stones. No cholecystitis. Normal CBD. No   change since recent CT.       Electronically signed by Ariel Alexandra      CT ABDOMEN PELVIS W IV CONTRAST Additional Contrast? None   Final Result      1. Left lung patchy pneumonia.   2. Chronic achalasia and debris-filled esophagus. Consider aspiration.   3. Increased cholelithiasis. No cholecystitis.   4. No bowel obstruction.      Electronically signed by Ariel Alexandra      XR CHEST PORTABLE   Final Result      Chronically dilated esophagus. Chronic interstitial lung disease. No   pneumothorax.         Electronically signed by Ariel Alexandra          Procedures     Procedures    ED Course      1:30 AM MAGALI DUKES (Ketan Pineda MD) am the first provider for this patient. Initial assessment performed. I reviewed the vital signs, available nursing notes, past medical history, past surgical history, family history and social history. The patients presenting problems have been discussed, and they are in agreement with the care plan formulated and outlined with them. I have encouraged them to ask questions as they arise throughout their visit.    Arrival: Patient resting company no acute distress.  NIH stroke scale 0    Vital signs: Stable.    EK A 12 lead EKG was performed, interpreted by me.  Rate 54, sinus rhythm, normal axis, no acute ST elevations depressions or T wave inversion.  QTc 458    Labs: CBC is normal.  CMP also normal.  Lipase level very elevated at 1698.  COVID influenza test negative.  Urinalysis does show some glucose but no other abnormalities.  Troponin normal    Lab repeated the lipase level on a different tube.  Repeat level 1719.    Imaging: Chest x-ray shows sign of interstitial lung disease.  CT of the abdomen pelvis with IV contrast shows gallstones, esophageal achalasia and left-sided pneumonia.  Ultrasound right upper quadrant shows gallstones but no signs of  UTI.    Symptoms are very nonspecific.  Labs did show very elevated lipase.  She only has very mild epigastric tenderness to palpation.  I did have the lab repeat this but it was similarly elevated.  Will have GI evaluate for the achalasia.  Will admit for antibiotics due to pneumonia likely aspirational from her severe achalasia.    Consult Note:  Time: 5:13 AM EST   Physician: Dr. Puri  Specialty: GI  Discussion: I discussed the patient's presentation, vitals, labs and imaging if available.  He will the patient for both the achalasia and elevated lipase    Consult Note:  Time: 5:32 AM EST   Physician: Dr. Erickson  Specialty: Hospitalist  Discussion: I discussed the patient's presentation, vitals, labs and imaging if available.  She accepts admission      Critical Care: None  Diagnosis and Disposition   Positive and negative test results were discussed. My clinical assessment and recommendations were discussed. Patient agrees with the plan of admission.  All questions were answered and the patient in in agreement with plan.    Core Measures:  For Hospitalized Patients:    Hospitalization Decision Time:  The decision to hospitalize the patient was made by Ketan Pineda MD @ at 0300 on 11/22/2024    Patient is being admitted to the hospital by Dr. Erickson. The results of their tests and reasons for their admission have been discussed with them and/or available family. They convey agreement and understanding for the need to be admitted and for their admission diagnosis.      CONDITIONS ON ADMISSION:  Sepsis is not present at the time of admission. Deep Vein Thrombosis is not present at the time of admission. Thrombosis is not present at the time of admission. Urinary Tract Infection is not present at the time of admission. Pneumonia is  present at the time of admission. MRSA is not present at the time of admission. Wound infection is not present at the time of admission. Pressure Ulcer is not present at the

## 2024-11-23 NOTE — PROGRESS NOTES
4 Eyes Skin Assessment     NAME:  Dianna Joseph  YOB: 1950  MEDICAL RECORD NUMBER:  [id number]    The patient is being assessed for  Admission    I agree that at least one RN has performed a thorough Head to Toe Skin Assessment on the patient. ALL assessment sites listed below have been assessed.      Areas assessed by both nurses:    Head, Face, Ears, Shoulders, Back, Chest, Arms, Elbows, Hands, Sacrum. Buttock, Coccyx, Ischium, Legs. Feet and Heels, and Under Medical Devices         Does the Patient have a Wound? No noted wound(s)       Kar Prevention initiated by RN: No  Wound Care Orders initiated by RN: No    Pressure Injury (Stage 3,4, Unstageable, DTI, NWPT, and Complex wounds) if present, place Wound referral order by RN under : No    New Ostomies, if present place, Ostomy referral order under : No     Nurse 1 eSignature: Electronically signed by Aurelia Taylor RN on 11/22/24 at 12:27 PM EST    **SHARE this note so that the co-signing nurse can place an eSignature**    Nurse 2 eSignature: Electronically signed by Minnie French RN on 11/22/24 at 2:44 PM EST   
In chart to assist primary RN, Pt's daughter approached the nurses station requesting information regarding her mother's \"turquoise blue\" sweater. Per daughter, the sweater didn't make it up from the ER on admission. Security called, at this time they do not have a sweater in the lost and found. This RN will update primary RN of findings.   
systems  Family/Friends Interventions include: Facilitated expression of thoughts and feelings, Explored spiritual coping/struggle/distress, and Affirmed coping skills/support systems    Patient Plan of Care: No spiritual needs identified for follow-up  Family/Friends Plan of Care: No spiritual needs identified for follow-up    Electronically signed by Chaplain Di on 11/22/2024 at 1:43 PM

## 2024-11-23 NOTE — DISCHARGE SUMMARY
Discharge Summary    Patient: Dianna Joseph               Sex: female          DOA: 11/22/2024         YOB: 1950      Age:  74 y.o.        LOS:  LOS: 1 day                Admit Date: 11/22/2024    Discharge Date: 11/23/2024    Admission Diagnoses: Aspiration pneumonia due to inhalation of vomitus (HCC) [J69.0]  Dizziness [R42]  Pneumonia of left lung due to infectious organism, unspecified part of lung [J18.9]  Acute pancreatitis, unspecified complication status, unspecified pancreatitis type [K85.90]    Discharge Diagnoses:    Hospital Problems             Last Modified POA    * (Principal) Aspiration pneumonia due to inhalation of vomitus (HCC) 11/22/2024 Yes    HTN (hypertension) 11/22/2024 Yes    DM w/o complication type II (HCC) 11/22/2024 Yes    Achalasia of esophagus 11/22/2024 Yes    Acute metabolic encephalopathy 11/22/2024 Yes    Esophageal obstruction 11/22/2024 Unknown        Discharge Condition: Stable    Hospital Course ; Ms. Joseph is a 74-year-old female with a past medical history of achalasia?,  Hypertension, diabetes mellitus who presented to the emergency department confused.  Further workup revealed patient had pneumonia concerning from aspiration.  Her esophagus was dilated.  GI was consulted.  Patient started on Unasyn.  Patient underwent EGD and the following was found: Food bolus in esophagus status post clearance, dilated esophagus, status post balloon dilation to 20 mm, poor/abnormal esophageal clearance.  GI recommended patient continue with full liquid/puréed diet.  He started her on Protonix and diltiazem.  She was strongly advised to avoid solids for now as well as uncooked vegetables and salads.  Patient scheduled to follow-up with GI and her PCP    Physical Exam:  /77   Pulse 56   Temp 97.9 °F (36.6 °C) (Oral)   Resp 18   Ht 1.6 m (5' 3\")   Wt 53.2 kg (117 lb 4.8 oz)   SpO2 98%   BMI 20.78 kg/m²   General appearance: alert, cooperative, no distress,

## 2024-11-23 NOTE — CARE COORDINATION
11/23/24 1400   Service Assessment   Patient Orientation Alert and Oriented   Cognition Alert   History Provided By Patient   Primary Caregiver Self   Support Systems Children   PCP Verified by CM Yes   Last Visit to PCP Within last 3 months   Prior Functional Level Independent in ADLs/IADLs   Current Functional Level Independent in ADLs/IADLs   Can patient return to prior living arrangement Yes   Ability to make needs known: Good   Family able to assist with home care needs: Yes   Financial Resources Medicare   Social/Functional History   Lives With Daughter   Type of Home House   Home Layout One level   Home Equipment None   ADL Assistance Independent   Homemaking Assistance Independent   Homemaking Responsibilities No   Ambulation Assistance Independent   Transfer Assistance Independent   Active  No   Patient's  Info daughters   Mode of Transportation Car   Occupation Retired   Type of Occupation worked in [place]   Discharge Planning   Living Arrangements Children   Current Services Prior To Admission None   Potential Assistance Needed N/A   DME Ordered? No   Potential Assistance Purchasing Medications No   Type of Home Care Services None   Patient expects to be discharged to: House   One/Two Story Residence One story   History of falls? 0   Services At/After Discharge   Transition of Care Consult (CM Consult) N/A   [place] Resource Information Provided? No   Confirm Follow Up Transport Family   Condition of Participation: Discharge Planning   The Plan for Transition of Care is related to the following treatment goals: home when eating without issue     Care manager met with patient and daughter at bedside; post EGD procedure yesterday, no issues with swallowing.  Anticipate d/c later today with daughter.

## 2024-11-23 NOTE — ANESTHESIA POSTPROCEDURE EVALUATION
Post-Anesthesia Evaluation and Assessment    Cardiovascular Function/Vital Signs  Visit Vitals  BP (!) 164/70   Pulse 62   Temp 36.6 °C (97.9 °F) (Oral)   Resp 16   Ht 1.6 m (5' 3\")   Wt 53.2 kg (117 lb 4.8 oz)   SpO2 95%   BMI 20.78 kg/m²       Patient is status post Procedure(s):  ESOPHAGOGASTRODUODENOSCOPY; BALLOON DILATATION 15-18 + 18-20.    Nausea/Vomiting: Controlled.    Postoperative hydration reviewed and adequate.    Pain:      Managed.    Neurological Status:       At baseline.    Mental Status and Level of Consciousness: Arousable.    Pulmonary Status:       Adequate oxygenation and airway patent.    Complications related to anesthesia: None    Post-anesthesia assessment completed. No concerns.    Patient has met all discharge requirements.    Signed By: MOHAMUD Mullins - DAVID    November 22, 2024

## 2024-11-23 NOTE — PLAN OF CARE
Problem: Chronic Conditions and Co-morbidities  Goal: Patient's chronic conditions and co-morbidity symptoms are monitored and maintained or improved  Outcome: Progressing     Problem: Safety - Adult  Goal: Free from fall injury  11/23/2024 0230 by Sujatha Abernathy RN  Outcome: Progressing  11/22/2024 1607 by Aurelia Taylor RN  Outcome: Progressing  Note: Pt belongings, call light, and bedside table within reach.     Problem: Pain  Goal: Verbalizes/displays adequate comfort level or baseline comfort level  11/23/2024 0230 by Sujatha Abernathy RN  Outcome: Progressing  11/22/2024 1607 by Aurelia Taylor RN  Outcome: Progressing  Flowsheets (Taken 11/22/2024 1607)  Verbalizes/displays adequate comfort level or baseline comfort level:   Encourage patient to monitor pain and request assistance   Assess pain using appropriate pain scale     
  Problem: Safety - Adult  Goal: Free from fall injury  Outcome: Progressing  Note: Pt belongings, call light, and bedside table within reach.     Problem: Pain  Goal: Verbalizes/displays adequate comfort level or baseline comfort level  Outcome: Progressing  Flowsheets (Taken 11/22/2024 7120)  Verbalizes/displays adequate comfort level or baseline comfort level:   Encourage patient to monitor pain and request assistance   Assess pain using appropriate pain scale     
Care plan reviewed  
no